# Patient Record
Sex: FEMALE | Race: WHITE | NOT HISPANIC OR LATINO | Employment: OTHER | URBAN - METROPOLITAN AREA
[De-identification: names, ages, dates, MRNs, and addresses within clinical notes are randomized per-mention and may not be internally consistent; named-entity substitution may affect disease eponyms.]

---

## 2019-05-21 ENCOUNTER — HOME CARE/HOSPICE - HEALTHEAST (OUTPATIENT)
Dept: HOME HEALTH SERVICES | Facility: HOME HEALTH | Age: 64
End: 2019-05-21

## 2019-05-21 ENCOUNTER — SURGERY - HEALTHEAST (OUTPATIENT)
Dept: GASTROENTEROLOGY | Facility: HOSPITAL | Age: 64
End: 2019-05-21

## 2019-05-21 ASSESSMENT — MIFFLIN-ST. JEOR: SCORE: 1263.92

## 2019-05-22 ASSESSMENT — MIFFLIN-ST. JEOR: SCORE: 1256.66

## 2019-05-23 ENCOUNTER — COMMUNICATION - HEALTHEAST (OUTPATIENT)
Dept: SCHEDULING | Facility: CLINIC | Age: 64
End: 2019-05-23

## 2019-05-24 ENCOUNTER — RECORDS - HEALTHEAST (OUTPATIENT)
Dept: ADMINISTRATIVE | Facility: OTHER | Age: 64
End: 2019-05-24

## 2019-05-28 ENCOUNTER — HOME CARE/HOSPICE - HEALTHEAST (OUTPATIENT)
Dept: HOME HEALTH SERVICES | Facility: HOME HEALTH | Age: 64
End: 2019-05-28

## 2019-05-28 ENCOUNTER — RECORDS - HEALTHEAST (OUTPATIENT)
Dept: ADMINISTRATIVE | Facility: OTHER | Age: 64
End: 2019-05-28

## 2019-05-31 ENCOUNTER — HOME CARE/HOSPICE - HEALTHEAST (OUTPATIENT)
Dept: HOME HEALTH SERVICES | Facility: HOME HEALTH | Age: 64
End: 2019-05-31

## 2019-06-03 ENCOUNTER — HOME CARE/HOSPICE - HEALTHEAST (OUTPATIENT)
Dept: HOME HEALTH SERVICES | Facility: HOME HEALTH | Age: 64
End: 2019-06-03

## 2019-06-04 ENCOUNTER — HOME CARE/HOSPICE - HEALTHEAST (OUTPATIENT)
Dept: HOME HEALTH SERVICES | Facility: HOME HEALTH | Age: 64
End: 2019-06-04

## 2019-06-07 ENCOUNTER — HOME CARE/HOSPICE - HEALTHEAST (OUTPATIENT)
Dept: HOME HEALTH SERVICES | Facility: HOME HEALTH | Age: 64
End: 2019-06-07

## 2019-06-11 ENCOUNTER — HOME CARE/HOSPICE - HEALTHEAST (OUTPATIENT)
Dept: HOME HEALTH SERVICES | Facility: HOME HEALTH | Age: 64
End: 2019-06-11

## 2019-06-13 ENCOUNTER — HOME CARE/HOSPICE - HEALTHEAST (OUTPATIENT)
Dept: HOME HEALTH SERVICES | Facility: HOME HEALTH | Age: 64
End: 2019-06-13

## 2019-06-13 ENCOUNTER — RECORDS - HEALTHEAST (OUTPATIENT)
Dept: ADMINISTRATIVE | Facility: OTHER | Age: 64
End: 2019-06-13

## 2019-06-14 ENCOUNTER — RECORDS - HEALTHEAST (OUTPATIENT)
Dept: ADMINISTRATIVE | Facility: OTHER | Age: 64
End: 2019-06-14

## 2019-06-18 ENCOUNTER — HOME CARE/HOSPICE - HEALTHEAST (OUTPATIENT)
Dept: HOME HEALTH SERVICES | Facility: HOME HEALTH | Age: 64
End: 2019-06-18

## 2019-06-20 ENCOUNTER — HOME CARE/HOSPICE - HEALTHEAST (OUTPATIENT)
Dept: HOME HEALTH SERVICES | Facility: HOME HEALTH | Age: 64
End: 2019-06-20

## 2019-06-25 ENCOUNTER — HOME CARE/HOSPICE - HEALTHEAST (OUTPATIENT)
Dept: HOME HEALTH SERVICES | Facility: HOME HEALTH | Age: 64
End: 2019-06-25

## 2019-06-27 ENCOUNTER — RECORDS - HEALTHEAST (OUTPATIENT)
Dept: ADMINISTRATIVE | Facility: OTHER | Age: 64
End: 2019-06-27

## 2019-06-27 ENCOUNTER — HOME CARE/HOSPICE - HEALTHEAST (OUTPATIENT)
Dept: HOME HEALTH SERVICES | Facility: HOME HEALTH | Age: 64
End: 2019-06-27

## 2019-06-28 ENCOUNTER — RECORDS - HEALTHEAST (OUTPATIENT)
Dept: ADMINISTRATIVE | Facility: OTHER | Age: 64
End: 2019-06-28

## 2019-07-01 ENCOUNTER — RECORDS - HEALTHEAST (OUTPATIENT)
Dept: ADMINISTRATIVE | Facility: OTHER | Age: 64
End: 2019-07-01

## 2019-07-02 ENCOUNTER — HOME CARE/HOSPICE - HEALTHEAST (OUTPATIENT)
Dept: HOME HEALTH SERVICES | Facility: HOME HEALTH | Age: 64
End: 2019-07-02

## 2019-07-03 ENCOUNTER — HOME CARE/HOSPICE - HEALTHEAST (OUTPATIENT)
Dept: HOME HEALTH SERVICES | Facility: HOME HEALTH | Age: 64
End: 2019-07-03

## 2019-07-04 ENCOUNTER — RECORDS - HEALTHEAST (OUTPATIENT)
Dept: ADMINISTRATIVE | Facility: OTHER | Age: 64
End: 2019-07-04

## 2019-07-05 ENCOUNTER — HOME CARE/HOSPICE - HEALTHEAST (OUTPATIENT)
Dept: HOME HEALTH SERVICES | Facility: HOME HEALTH | Age: 64
End: 2019-07-05

## 2019-07-08 ENCOUNTER — HOME CARE/HOSPICE - HEALTHEAST (OUTPATIENT)
Dept: HOME HEALTH SERVICES | Facility: HOME HEALTH | Age: 64
End: 2019-07-08

## 2019-07-10 ENCOUNTER — HOME CARE/HOSPICE - HEALTHEAST (OUTPATIENT)
Dept: HOME HEALTH SERVICES | Facility: HOME HEALTH | Age: 64
End: 2019-07-10

## 2019-07-11 ENCOUNTER — HOME CARE/HOSPICE - HEALTHEAST (OUTPATIENT)
Dept: HOME HEALTH SERVICES | Facility: HOME HEALTH | Age: 64
End: 2019-07-11

## 2019-07-12 ENCOUNTER — HOME CARE/HOSPICE - HEALTHEAST (OUTPATIENT)
Dept: HOME HEALTH SERVICES | Facility: HOME HEALTH | Age: 64
End: 2019-07-12

## 2019-07-15 ENCOUNTER — HOME CARE/HOSPICE - HEALTHEAST (OUTPATIENT)
Dept: HOME HEALTH SERVICES | Facility: HOME HEALTH | Age: 64
End: 2019-07-15

## 2019-07-17 ENCOUNTER — HOME CARE/HOSPICE - HEALTHEAST (OUTPATIENT)
Dept: HOME HEALTH SERVICES | Facility: HOME HEALTH | Age: 64
End: 2019-07-17

## 2019-07-18 ENCOUNTER — HOME CARE/HOSPICE - HEALTHEAST (OUTPATIENT)
Dept: HOME HEALTH SERVICES | Facility: HOME HEALTH | Age: 64
End: 2019-07-18

## 2019-07-19 ENCOUNTER — HOME CARE/HOSPICE - HEALTHEAST (OUTPATIENT)
Dept: HOME HEALTH SERVICES | Facility: HOME HEALTH | Age: 64
End: 2019-07-19

## 2019-07-22 ENCOUNTER — HOME CARE/HOSPICE - HEALTHEAST (OUTPATIENT)
Dept: HOME HEALTH SERVICES | Facility: HOME HEALTH | Age: 64
End: 2019-07-22

## 2019-07-23 ENCOUNTER — HOME CARE/HOSPICE - HEALTHEAST (OUTPATIENT)
Dept: HOME HEALTH SERVICES | Facility: HOME HEALTH | Age: 64
End: 2019-07-23

## 2019-07-24 ENCOUNTER — HOME CARE/HOSPICE - HEALTHEAST (OUTPATIENT)
Dept: HOME HEALTH SERVICES | Facility: HOME HEALTH | Age: 64
End: 2019-07-24

## 2019-08-02 ENCOUNTER — RECORDS - HEALTHEAST (OUTPATIENT)
Dept: ADMINISTRATIVE | Facility: OTHER | Age: 64
End: 2019-08-02

## 2021-05-29 NOTE — TELEPHONE ENCOUNTER
Triage call:   Trying to get a prescription filled- was admitted to P-1:    Disp Refills Start End    omeprazole (PRILOSEC) 20 MG capsule 60 capsule 0 5/23/2019     Sig - Route: Take 1 capsule (20 mg total) by mouth 2 (two) times a day before meals. - Oral    Class: Print      Called nursing unit patient was admitted to and they will follow up with patient. Not a [facility] patient- unable to route    Jo Ann Mcmahan RN BS[facility] Triage/Med Refill 5/23/2019 5:47 PM

## 2021-06-03 VITALS — HEIGHT: 62 IN | BODY MASS INDEX: 30.77 KG/M2 | WEIGHT: 167.2 LBS

## 2021-06-16 PROBLEM — R93.5 ABNORMAL CT OF THE ABDOMEN: Status: ACTIVE | Noted: 2019-05-20

## 2021-06-16 PROBLEM — E11.9 TYPE 2 DIABETES MELLITUS, WITH LONG-TERM CURRENT USE OF INSULIN (H): Status: ACTIVE | Noted: 2019-05-22

## 2021-06-16 PROBLEM — F10.939 ALCOHOL WITHDRAWAL, WITH UNSPECIFIED COMPLICATION (H): Status: ACTIVE | Noted: 2019-05-21

## 2021-06-16 PROBLEM — R29.6 FALLING EPISODES: Status: ACTIVE | Noted: 2019-05-22

## 2021-06-16 PROBLEM — E87.20 LACTIC ACIDOSIS: Status: ACTIVE | Noted: 2019-05-21

## 2021-06-16 PROBLEM — K22.10 ESOPHAGEAL ULCER: Status: ACTIVE | Noted: 2019-05-22

## 2021-06-16 PROBLEM — Z79.4 TYPE 2 DIABETES MELLITUS, WITH LONG-TERM CURRENT USE OF INSULIN (H): Status: ACTIVE | Noted: 2019-05-22

## 2021-06-16 PROBLEM — K29.60 EROSIVE GASTRITIS: Status: ACTIVE | Noted: 2019-05-22

## 2021-06-16 PROBLEM — I10 ESSENTIAL HYPERTENSION: Status: ACTIVE | Noted: 2019-05-22

## 2022-01-08 ENCOUNTER — APPOINTMENT (OUTPATIENT)
Dept: CT IMAGING | Facility: HOSPITAL | Age: 67
DRG: 208 | End: 2022-01-08
Payer: COMMERCIAL

## 2022-01-08 ENCOUNTER — HOSPITAL ENCOUNTER (INPATIENT)
Facility: HOSPITAL | Age: 67
LOS: 20 days | Discharge: HOME HEALTH | DRG: 208 | End: 2022-01-28
Attending: EMERGENCY MEDICINE | Admitting: FAMILY MEDICINE
Payer: COMMERCIAL

## 2022-01-08 DIAGNOSIS — U07.1 PNEUMONIA DUE TO 2019 NOVEL CORONAVIRUS: ICD-10-CM

## 2022-01-08 DIAGNOSIS — J12.82 PNEUMONIA DUE TO 2019 NOVEL CORONAVIRUS: ICD-10-CM

## 2022-01-08 DIAGNOSIS — J96.01 ACUTE RESPIRATORY FAILURE WITH HYPOXIA (H): ICD-10-CM

## 2022-01-08 DIAGNOSIS — U07.1 INFECTION DUE TO 2019 NOVEL CORONAVIRUS: ICD-10-CM

## 2022-01-08 DIAGNOSIS — E53.8 VITAMIN B12 DEFICIENCY: Primary | ICD-10-CM

## 2022-01-08 DIAGNOSIS — N28.9 ACUTE RENAL INSUFFICIENCY: ICD-10-CM

## 2022-01-08 DIAGNOSIS — E87.20 LACTIC ACIDOSIS: ICD-10-CM

## 2022-01-08 PROBLEM — R06.02 SHORTNESS OF BREATH: Status: ACTIVE | Noted: 2022-01-08

## 2022-01-08 PROBLEM — R05.9 COUGH: Status: ACTIVE | Noted: 2022-01-08

## 2022-01-08 PROBLEM — R79.89 ELEVATED D-DIMER: Status: ACTIVE | Noted: 2022-01-08

## 2022-01-08 PROBLEM — R74.02 NONSPECIFIC ELEVATION OF LEVELS OF TRANSAMINASE AND LACTIC ACID DEHYDROGENASE (LDH): Status: ACTIVE | Noted: 2022-01-08

## 2022-01-08 PROBLEM — N17.9 ACUTE KIDNEY INJURY (H): Status: ACTIVE | Noted: 2022-01-08

## 2022-01-08 PROBLEM — R74.01 NONSPECIFIC ELEVATION OF LEVELS OF TRANSAMINASE AND LACTIC ACID DEHYDROGENASE (LDH): Status: ACTIVE | Noted: 2022-01-08

## 2022-01-08 PROBLEM — R09.02 HYPOXIA: Status: ACTIVE | Noted: 2022-01-08

## 2022-01-08 LAB
ABO/RH(D): NORMAL
ALBUMIN SERPL-MCNC: 2.7 G/DL (ref 3.5–5)
ALP SERPL-CCNC: 76 U/L (ref 45–120)
ALT SERPL W P-5'-P-CCNC: 30 U/L (ref 0–45)
ANION GAP SERPL CALCULATED.3IONS-SCNC: 18 MMOL/L (ref 5–18)
APTT PPP: 32 SECONDS (ref 22–38)
AST SERPL W P-5'-P-CCNC: 69 U/L (ref 0–40)
BASE EXCESS BLDV CALC-SCNC: -1.6 MMOL/L
BASOPHILS # BLD AUTO: 0 10E3/UL (ref 0–0.2)
BASOPHILS NFR BLD AUTO: 0 %
BILIRUB DIRECT SERPL-MCNC: 0.2 MG/DL
BILIRUB SERPL-MCNC: 0.4 MG/DL (ref 0–1)
BNP SERPL-MCNC: 30 PG/ML (ref 0–109)
BUN SERPL-MCNC: 43 MG/DL (ref 8–22)
C REACTIVE PROTEIN LHE: 12 MG/DL (ref 0–0.8)
CALCIUM SERPL-MCNC: 9.9 MG/DL (ref 8.5–10.5)
CHLORIDE BLD-SCNC: 101 MMOL/L (ref 98–107)
CO2 SERPL-SCNC: 20 MMOL/L (ref 22–31)
CREAT SERPL-MCNC: 1.72 MG/DL (ref 0.6–1.1)
D DIMER PPP FEU-MCNC: 2.45 UG/ML FEU (ref 0–0.5)
EOSINOPHIL # BLD AUTO: 0 10E3/UL (ref 0–0.7)
EOSINOPHIL NFR BLD AUTO: 0 %
ERYTHROCYTE [DISTWIDTH] IN BLOOD BY AUTOMATED COUNT: 13.9 % (ref 10–15)
ETHANOL SERPL-MCNC: <10 MG/DL
FLUAV RNA SPEC QL NAA+PROBE: NEGATIVE
FLUBV RNA RESP QL NAA+PROBE: NEGATIVE
GFR SERPL CREATININE-BSD FRML MDRD: 32 ML/MIN/1.73M2
GLUCOSE BLD-MCNC: 132 MG/DL (ref 70–125)
GLUCOSE BLDC GLUCOMTR-MCNC: 147 MG/DL (ref 70–99)
HBA1C MFR BLD: 4.7 %
HCO3 BLDV-SCNC: 22 MMOL/L (ref 24–30)
HCT VFR BLD AUTO: 33.1 % (ref 35–47)
HGB BLD-MCNC: 11.1 G/DL (ref 11.7–15.7)
IMM GRANULOCYTES # BLD: 0.1 10E3/UL
IMM GRANULOCYTES NFR BLD: 1 %
INR PPP: 1.09 (ref 0.85–1.15)
LACTATE SERPL-SCNC: 2.1 MMOL/L (ref 0.7–2)
LYMPHOCYTES # BLD AUTO: 0.6 10E3/UL (ref 0.8–5.3)
LYMPHOCYTES NFR BLD AUTO: 9 %
MAGNESIUM SERPL-MCNC: 1.4 MG/DL (ref 1.8–2.6)
MCH RBC QN AUTO: 38.5 PG (ref 26.5–33)
MCHC RBC AUTO-ENTMCNC: 33.5 G/DL (ref 31.5–36.5)
MCV RBC AUTO: 115 FL (ref 78–100)
MONOCYTES # BLD AUTO: 0.5 10E3/UL (ref 0–1.3)
MONOCYTES NFR BLD AUTO: 7 %
NEUTROPHILS # BLD AUTO: 6 10E3/UL (ref 1.6–8.3)
NEUTROPHILS NFR BLD AUTO: 83 %
NRBC # BLD AUTO: 0 10E3/UL
NRBC BLD AUTO-RTO: 0 /100
OXYHGB MFR BLDV: 34 % (ref 70–75)
PCO2 BLDV: 40 MM HG (ref 35–50)
PH BLDV: 7.38 [PH] (ref 7.35–7.45)
PLATELET # BLD AUTO: 233 10E3/UL (ref 150–450)
PO2 BLDV: 25 MM HG (ref 25–47)
POTASSIUM BLD-SCNC: 3.5 MMOL/L (ref 3.5–5)
PROT SERPL-MCNC: 7.6 G/DL (ref 6–8)
RBC # BLD AUTO: 2.88 10E6/UL (ref 3.8–5.2)
SAO2 % BLDV: 34.5 % (ref 70–75)
SARS-COV-2 RNA RESP QL NAA+PROBE: POSITIVE
SODIUM SERPL-SCNC: 139 MMOL/L (ref 136–145)
SPECIMEN EXPIRATION DATE: NORMAL
TROPONIN I SERPL-MCNC: 0.24 NG/ML (ref 0–0.29)
WBC # BLD AUTO: 7.2 10E3/UL (ref 4–11)

## 2022-01-08 PROCEDURE — 258N000003 HC RX IP 258 OP 636: Performed by: EMERGENCY MEDICINE

## 2022-01-08 PROCEDURE — 36415 COLL VENOUS BLD VENIPUNCTURE: CPT | Performed by: EMERGENCY MEDICINE

## 2022-01-08 PROCEDURE — 36415 COLL VENOUS BLD VENIPUNCTURE: CPT | Performed by: PHYSICIAN ASSISTANT

## 2022-01-08 PROCEDURE — HZ2ZZZZ DETOXIFICATION SERVICES FOR SUBSTANCE ABUSE TREATMENT: ICD-10-PCS | Performed by: FAMILY MEDICINE

## 2022-01-08 PROCEDURE — C9803 HOPD COVID-19 SPEC COLLECT: HCPCS

## 2022-01-08 PROCEDURE — 86901 BLOOD TYPING SEROLOGIC RH(D): CPT | Performed by: FAMILY MEDICINE

## 2022-01-08 PROCEDURE — 120N000001 HC R&B MED SURG/OB

## 2022-01-08 PROCEDURE — 250N000011 HC RX IP 250 OP 636: Performed by: EMERGENCY MEDICINE

## 2022-01-08 PROCEDURE — 85610 PROTHROMBIN TIME: CPT | Performed by: EMERGENCY MEDICINE

## 2022-01-08 PROCEDURE — 85730 THROMBOPLASTIN TIME PARTIAL: CPT | Performed by: EMERGENCY MEDICINE

## 2022-01-08 PROCEDURE — 83605 ASSAY OF LACTIC ACID: CPT | Performed by: PHYSICIAN ASSISTANT

## 2022-01-08 PROCEDURE — 71275 CT ANGIOGRAPHY CHEST: CPT

## 2022-01-08 PROCEDURE — 84484 ASSAY OF TROPONIN QUANT: CPT | Performed by: PHYSICIAN ASSISTANT

## 2022-01-08 PROCEDURE — 250N000013 HC RX MED GY IP 250 OP 250 PS 637: Performed by: PHYSICIAN ASSISTANT

## 2022-01-08 PROCEDURE — 82248 BILIRUBIN DIRECT: CPT | Performed by: PHYSICIAN ASSISTANT

## 2022-01-08 PROCEDURE — 83735 ASSAY OF MAGNESIUM: CPT | Performed by: FAMILY MEDICINE

## 2022-01-08 PROCEDURE — 85379 FIBRIN DEGRADATION QUANT: CPT | Performed by: PHYSICIAN ASSISTANT

## 2022-01-08 PROCEDURE — 82805 BLOOD GASES W/O2 SATURATION: CPT | Performed by: EMERGENCY MEDICINE

## 2022-01-08 PROCEDURE — XW033E5 INTRODUCTION OF REMDESIVIR ANTI-INFECTIVE INTO PERIPHERAL VEIN, PERCUTANEOUS APPROACH, NEW TECHNOLOGY GROUP 5: ICD-10-PCS | Performed by: FAMILY MEDICINE

## 2022-01-08 PROCEDURE — 258N000003 HC RX IP 258 OP 636: Performed by: PHYSICIAN ASSISTANT

## 2022-01-08 PROCEDURE — 96361 HYDRATE IV INFUSION ADD-ON: CPT

## 2022-01-08 PROCEDURE — 99223 1ST HOSP IP/OBS HIGH 75: CPT | Performed by: FAMILY MEDICINE

## 2022-01-08 PROCEDURE — 87040 BLOOD CULTURE FOR BACTERIA: CPT | Performed by: PHYSICIAN ASSISTANT

## 2022-01-08 PROCEDURE — 83036 HEMOGLOBIN GLYCOSYLATED A1C: CPT | Performed by: FAMILY MEDICINE

## 2022-01-08 PROCEDURE — 85025 COMPLETE CBC W/AUTO DIFF WBC: CPT | Performed by: EMERGENCY MEDICINE

## 2022-01-08 PROCEDURE — 99285 EMERGENCY DEPT VISIT HI MDM: CPT | Mod: 25

## 2022-01-08 PROCEDURE — 87636 SARSCOV2 & INF A&B AMP PRB: CPT | Performed by: PHYSICIAN ASSISTANT

## 2022-01-08 PROCEDURE — 93005 ELECTROCARDIOGRAM TRACING: CPT | Performed by: PHYSICIAN ASSISTANT

## 2022-01-08 PROCEDURE — 96374 THER/PROPH/DIAG INJ IV PUSH: CPT | Mod: 59

## 2022-01-08 PROCEDURE — 83880 ASSAY OF NATRIURETIC PEPTIDE: CPT | Performed by: PHYSICIAN ASSISTANT

## 2022-01-08 PROCEDURE — 82077 ASSAY SPEC XCP UR&BREATH IA: CPT | Performed by: EMERGENCY MEDICINE

## 2022-01-08 PROCEDURE — 86140 C-REACTIVE PROTEIN: CPT | Performed by: PHYSICIAN ASSISTANT

## 2022-01-08 PROCEDURE — 94640 AIRWAY INHALATION TREATMENT: CPT

## 2022-01-08 RX ORDER — LIDOCAINE 40 MG/G
CREAM TOPICAL
Status: DISCONTINUED | OUTPATIENT
Start: 2022-01-08 | End: 2022-01-28 | Stop reason: HOSPADM

## 2022-01-08 RX ORDER — PROCHLORPERAZINE 25 MG
12.5 SUPPOSITORY, RECTAL RECTAL EVERY 12 HOURS PRN
Status: DISCONTINUED | OUTPATIENT
Start: 2022-01-08 | End: 2022-01-28 | Stop reason: HOSPADM

## 2022-01-08 RX ORDER — IOPAMIDOL 755 MG/ML
75 INJECTION, SOLUTION INTRAVASCULAR ONCE
Status: COMPLETED | OUTPATIENT
Start: 2022-01-08 | End: 2022-01-08

## 2022-01-08 RX ORDER — NICOTINE 21 MG/24HR
1 PATCH, TRANSDERMAL 24 HOURS TRANSDERMAL EVERY 24 HOURS
Status: DISCONTINUED | OUTPATIENT
Start: 2022-01-08 | End: 2022-01-16

## 2022-01-08 RX ORDER — DEXAMETHASONE SODIUM PHOSPHATE 10 MG/ML
6 INJECTION, SOLUTION INTRAMUSCULAR; INTRAVENOUS DAILY
Status: DISCONTINUED | OUTPATIENT
Start: 2022-01-08 | End: 2022-01-14

## 2022-01-08 RX ORDER — ACETAMINOPHEN 650 MG/1
650 SUPPOSITORY RECTAL EVERY 6 HOURS PRN
Status: DISCONTINUED | OUTPATIENT
Start: 2022-01-08 | End: 2022-01-28 | Stop reason: HOSPADM

## 2022-01-08 RX ORDER — CLONIDINE HYDROCHLORIDE 0.1 MG/1
0.2 TABLET ORAL 2 TIMES DAILY
Status: DISCONTINUED | OUTPATIENT
Start: 2022-01-08 | End: 2022-01-28 | Stop reason: HOSPADM

## 2022-01-08 RX ORDER — DEXTROSE MONOHYDRATE 25 G/50ML
25-50 INJECTION, SOLUTION INTRAVENOUS
Status: DISCONTINUED | OUTPATIENT
Start: 2022-01-08 | End: 2022-01-16

## 2022-01-08 RX ORDER — SODIUM CHLORIDE, SODIUM LACTATE, POTASSIUM CHLORIDE, CALCIUM CHLORIDE 600; 310; 30; 20 MG/100ML; MG/100ML; MG/100ML; MG/100ML
INJECTION, SOLUTION INTRAVENOUS CONTINUOUS
Status: DISCONTINUED | OUTPATIENT
Start: 2022-01-08 | End: 2022-01-08

## 2022-01-08 RX ORDER — SODIUM CHLORIDE 9 MG/ML
INJECTION, SOLUTION INTRAVENOUS CONTINUOUS
Status: DISCONTINUED | OUTPATIENT
Start: 2022-01-08 | End: 2022-01-09

## 2022-01-08 RX ORDER — OLANZAPINE 5 MG/1
5-10 TABLET, ORALLY DISINTEGRATING ORAL EVERY 6 HOURS PRN
Status: DISCONTINUED | OUTPATIENT
Start: 2022-01-08 | End: 2022-01-26

## 2022-01-08 RX ORDER — NICOTINE POLACRILEX 4 MG
15-30 LOZENGE BUCCAL
Status: DISCONTINUED | OUTPATIENT
Start: 2022-01-08 | End: 2022-01-16

## 2022-01-08 RX ORDER — PROCHLORPERAZINE MALEATE 5 MG
5 TABLET ORAL EVERY 6 HOURS PRN
Status: DISCONTINUED | OUTPATIENT
Start: 2022-01-08 | End: 2022-01-28 | Stop reason: HOSPADM

## 2022-01-08 RX ORDER — MULTIPLE VITAMINS W/ MINERALS TAB 9MG-400MCG
1 TAB ORAL DAILY
Status: DISCONTINUED | OUTPATIENT
Start: 2022-01-09 | End: 2022-01-14

## 2022-01-08 RX ORDER — ALBUTEROL SULFATE 90 UG/1
2 AEROSOL, METERED RESPIRATORY (INHALATION) EVERY 6 HOURS PRN
Status: DISCONTINUED | OUTPATIENT
Start: 2022-01-08 | End: 2022-01-08

## 2022-01-08 RX ORDER — LORAZEPAM 1 MG/1
1-2 TABLET ORAL EVERY 30 MIN PRN
Status: DISCONTINUED | OUTPATIENT
Start: 2022-01-08 | End: 2022-01-17

## 2022-01-08 RX ORDER — DEXAMETHASONE SODIUM PHOSPHATE 10 MG/ML
6 INJECTION, SOLUTION INTRAMUSCULAR; INTRAVENOUS ONCE
Status: COMPLETED | OUTPATIENT
Start: 2022-01-08 | End: 2022-01-08

## 2022-01-08 RX ORDER — ATENOLOL 25 MG/1
50 TABLET ORAL DAILY
Status: DISCONTINUED | OUTPATIENT
Start: 2022-01-09 | End: 2022-01-28 | Stop reason: HOSPADM

## 2022-01-08 RX ORDER — ACETAMINOPHEN 325 MG/1
650 TABLET ORAL EVERY 6 HOURS PRN
Status: DISCONTINUED | OUTPATIENT
Start: 2022-01-08 | End: 2022-01-28 | Stop reason: HOSPADM

## 2022-01-08 RX ORDER — ONDANSETRON 4 MG/1
4 TABLET, ORALLY DISINTEGRATING ORAL EVERY 6 HOURS PRN
Status: DISCONTINUED | OUTPATIENT
Start: 2022-01-08 | End: 2022-01-26

## 2022-01-08 RX ORDER — METHYLPREDNISOLONE SODIUM SUCCINATE 125 MG/2ML
125 INJECTION, POWDER, LYOPHILIZED, FOR SOLUTION INTRAMUSCULAR; INTRAVENOUS ONCE
Status: DISCONTINUED | OUTPATIENT
Start: 2022-01-08 | End: 2022-01-08

## 2022-01-08 RX ORDER — LORAZEPAM 2 MG/ML
1-2 INJECTION INTRAMUSCULAR EVERY 30 MIN PRN
Status: DISCONTINUED | OUTPATIENT
Start: 2022-01-08 | End: 2022-01-17

## 2022-01-08 RX ORDER — AMLODIPINE BESYLATE 5 MG/1
10 TABLET ORAL DAILY
Status: DISCONTINUED | OUTPATIENT
Start: 2022-01-09 | End: 2022-01-17

## 2022-01-08 RX ORDER — HALOPERIDOL 5 MG/ML
2.5-5 INJECTION INTRAMUSCULAR EVERY 6 HOURS PRN
Status: DISCONTINUED | OUTPATIENT
Start: 2022-01-08 | End: 2022-01-26

## 2022-01-08 RX ORDER — FLUMAZENIL 0.1 MG/ML
0.2 INJECTION, SOLUTION INTRAVENOUS
Status: DISCONTINUED | OUTPATIENT
Start: 2022-01-08 | End: 2022-01-28 | Stop reason: HOSPADM

## 2022-01-08 RX ORDER — ASPIRIN 81 MG/1
81 TABLET ORAL DAILY
Status: DISCONTINUED | OUTPATIENT
Start: 2022-01-09 | End: 2022-01-14

## 2022-01-08 RX ORDER — AMLODIPINE BESYLATE 10 MG/1
10 TABLET ORAL
COMMUNITY
Start: 2022-01-06

## 2022-01-08 RX ORDER — FOLIC ACID 1 MG/1
1 TABLET ORAL DAILY
Status: DISCONTINUED | OUTPATIENT
Start: 2022-01-09 | End: 2022-01-28 | Stop reason: HOSPADM

## 2022-01-08 RX ORDER — ONDANSETRON 2 MG/ML
4 INJECTION INTRAMUSCULAR; INTRAVENOUS EVERY 6 HOURS PRN
Status: DISCONTINUED | OUTPATIENT
Start: 2022-01-08 | End: 2022-01-26

## 2022-01-08 RX ADMIN — DEXAMETHASONE SODIUM PHOSPHATE 6 MG: 10 INJECTION, SOLUTION INTRAMUSCULAR; INTRAVENOUS at 16:27

## 2022-01-08 RX ADMIN — IOPAMIDOL 75 ML: 755 INJECTION, SOLUTION INTRAVENOUS at 17:52

## 2022-01-08 RX ADMIN — ALBUTEROL SULFATE 2 PUFF: 90 AEROSOL, METERED RESPIRATORY (INHALATION) at 16:57

## 2022-01-08 RX ADMIN — SODIUM CHLORIDE 1000 ML: 9 INJECTION, SOLUTION INTRAVENOUS at 17:21

## 2022-01-08 RX ADMIN — SODIUM CHLORIDE 500 ML: 9 INJECTION, SOLUTION INTRAVENOUS at 20:15

## 2022-01-08 ASSESSMENT — ACTIVITIES OF DAILY LIVING (ADL)
WEAR_GLASSES_OR_BLIND: NO
ADLS_ACUITY_SCORE: 5
ADLS_ACUITY_SCORE: 9
DIFFICULTY_EATING/SWALLOWING: NO
CONCENTRATING,_REMEMBERING_OR_MAKING_DECISIONS_DIFFICULTY: NO
ADLS_ACUITY_SCORE: 9
ADLS_ACUITY_SCORE: 9
EQUIPMENT_CURRENTLY_USED_AT_HOME: CANE, STRAIGHT
DIFFICULTY_COMMUNICATING: NO
TOILETING_ISSUES: NO
DRESSING/BATHING_DIFFICULTY: NO
ADLS_ACUITY_SCORE: 9

## 2022-01-08 ASSESSMENT — ENCOUNTER SYMPTOMS
ABDOMINAL PAIN: 0
BACK PAIN: 0
SHORTNESS OF BREATH: 1
COUGH: 0
FEVER: 0

## 2022-01-08 NOTE — ED NOTES
Patient lying in bed, awakens to questions. Able to answer questions appropriately, becomes short of breath when talking.

## 2022-01-08 NOTE — ED PROVIDER NOTES
Emergency Department Midlevel Supervisory Note     I personally saw the patient and performed a substantive portion of the visit including all aspects of the medical decision making.    ED Course:  3:52 PM Adriana Alston PA-C staffed patient with me. I agree with their assessment and plan of management, and I will see the patient.  3:55 PM I met with the patient to introduce myself, gather additional history, perform my initial exam, and discuss the plan.       PPE: Provider wore gloves, N95 mask, eye protection, surgical cap, and paper mask.     FINAL IMPRESSION:    ICD-10-CM    1. Acute respiratory failure with hypoxia (H)  J96.01    2. Pneumonia due to 2019 novel coronavirus  U07.1     J12.82    3. Acute renal insufficiency  N28.9    4. Lactic acidosis  E87.2      MEDICAL DECISION MAKIN year old female, history of DM2, HTN, HLD, alcohol abuse and schizoaffective disorder, who presents for evaluation of shortness of breath and weakness. She reports cough x ~2 weeks with onset of SOB today. She was hypoxic to 76% on RA when medics arrived. She denies associated chest pain. She was diagnosed with COVID ~11 days ago; she has not been vaccinated.    On exam, patient is ill-appearing. She is sleepy, but arouses easily to voice. She has tachypnea with coarse breath sounds diffusely; she has scattered dry crackles with no audible wheezes. She has tachycardic rate with regular rhythm.      On presentation, patient on NRB at 10L with O2 sats upper 90%s.  Patient transitioned to NC with O2 sats mid-upper 90%s on 5L.    Patient placed on cardiac monitor, IV access established and blood sent for labs.    EKG demonstrated sinus tachycardia with no ischemic changes; troponin upper limits of normal (0.24).    Unable to find documentation of COVID results - COVID test here positive.    Given hypoxia, patient given IV Decadron.     D-dimer elevated (2.45).  CTA chest performed and demonstrated moderate BL pulmonary  infiltrates consistent with COVID 19 pneumonitis with no PE; CAD.    Lactate mildly elevated (2.1), I suspect secondary to hypoxia rather than sepsis.  She was given gentle IV hydration; not given full 30 ml/kg IVF bolus given COVID infection.    She does have acute renal insufficiency with Creatinine 1.72 (GFR 32) - previous from 2 years ago was normal.    Patient admitted to Hospitalist Service for further management.  Patient hemodynamically stable throughout ED course.      MEDICATIONS GIVEN IN THE EMERGENCY DEPARTMENT:  Medications   amLODIPine (NORVASC) tablet 10 mg (10 mg Oral Given 1/9/22 0927)   aspirin EC tablet 81 mg ( Oral Automatically Held 1/12/22 0900)   atenolol (TENORMIN) tablet 50 mg (50 mg Oral Given 1/9/22 0927)   cloNIDine (CATAPRES) tablet 0.2 mg (0.2 mg Oral Given 1/9/22 0926)   insulin glargine (LANTUS PEN) injection 15 Units (15 Units Subcutaneous Given 1/9/22 0042)   OLANZapine (zyPREXA) tablet 15 mg (15 mg Oral Given 1/9/22 0043)   omeprazole (priLOSEC) CR capsule 20 mg (20 mg Oral Given 1/9/22 0926)   sertraline (ZOLOFT) tablet 150 mg (150 mg Oral Given 1/9/22 0936)   lidocaine 1 % 0.1-1 mL (has no administration in time range)   lidocaine (LMX4) cream (has no administration in time range)   sodium chloride (PF) 0.9% PF flush 3 mL (3 mLs Intracatheter Not Given 1/9/22 0601)   sodium chloride (PF) 0.9% PF flush 3 mL (has no administration in time range)   Medication instructions: Do NOT use nebulized medications (has no administration in time range)   glucose gel 15-30 g (has no administration in time range)     Or   dextrose 50 % injection 25-50 mL (has no administration in time range)     Or   glucagon injection 1 mg (has no administration in time range)   ipratropium-albuterol (COMBIVENT RESPIMAT) inhaler 2 puff (2 puffs Inhalation Given 1/9/22 0929)   dexamethasone PF (DECADRON) injection 6 mg (6 mg Intravenous Not Given 1/8/22 2225)   remdesivir 100 mg in sodium chloride 0.9 % 250 mL  intermittent infusion (has no administration in time range)     And   0.9% sodium chloride BOLUS (has no administration in time range)   acetaminophen (TYLENOL) tablet 650 mg (has no administration in time range)     Or   acetaminophen (TYLENOL) Suppository 650 mg (has no administration in time range)   ondansetron (ZOFRAN-ODT) ODT tab 4 mg (has no administration in time range)     Or   ondansetron (ZOFRAN) injection 4 mg (has no administration in time range)   prochlorperazine (COMPAZINE) injection 5 mg (has no administration in time range)     Or   prochlorperazine (COMPAZINE) tablet 5 mg (has no administration in time range)     Or   prochlorperazine (COMPAZINE) suppository 12.5 mg (has no administration in time range)   insulin aspart (NovoLOG) injection (RAPID ACTING) (1 Units Subcutaneous Not Given 1/9/22 0925)   insulin aspart (NovoLOG) injection (RAPID ACTING) (1 Units Subcutaneous Not Given 1/8/22 2251)   enoxaparin ANTICOAGULANT (LOVENOX) injection 40 mg ( Subcutaneous Automatically Held 1/14/22 2230)   nicotine Patch in Place ( Transdermal Patch in Place 1/9/22 0423)   nicotine (NICODERM CQ) 21 MG/24HR 24 hr patch 1 patch (1 patch Transdermal Patch/Med Applied 1/9/22 0045)   OLANZapine zydis (zyPREXA) ODT tab 5-10 mg (has no administration in time range)     Or   haloperidol lactate (HALDOL) injection 2.5-5 mg (has no administration in time range)   flumazenil (ROMAZICON) injection 0.2 mg (has no administration in time range)   melatonin tablet 5 mg (has no administration in time range)   LORazepam (ATIVAN) tablet 1-2 mg (has no administration in time range)     Or   LORazepam (ATIVAN) injection 1-2 mg (has no administration in time range)   thiamine (B-1) tablet 100 mg (100 mg Oral Given 1/9/22 0937)   folic acid (FOLVITE) tablet 1 mg (1 mg Oral Given 1/9/22 0926)   multivitamin w/minerals (THERA-VIT-M) tablet 1 tablet (1 tablet Oral Given 1/9/22 0926)   dexamethasone PF (DECADRON) injection 6 mg (6 mg  Intravenous Given 1/8/22 1627)   0.9% sodium chloride BOLUS (0 mLs Intravenous Stopped 1/8/22 1958)   0.9% sodium chloride BOLUS (500 mLs Intravenous New Bag 1/8/22 2015)   iopamidol (ISOVUE-370) solution 75 mL (75 mLs Intravenous Given 1/8/22 1752)   remdesivir 200 mg in sodium chloride 0.9 % 250 mL intermittent infusion (200 mg Intravenous New Bag 1/9/22 0030)     Followed by   0.9% sodium chloride BOLUS (50 mLs Intravenous New Bag 1/9/22 0033)   sodium chloride 0.9 % 1,000 mL with Infuvite Adult 10 mL, thiamine 100 mg, folic acid 1 mg infusion ( Intravenous New Bag 1/9/22 0040)       NEW PRESCRIPTIONS STARTED AT TODAY'S ED VISIT:  Current Discharge Medication List              CHIEF COMPLAINT:  Shortness of Breath and Generalized Weakness      Triage Note:Pt tested positive for COVID around 12/28/21. Comes in with EMS for shortness of breath and generalized weakness. Pt says she always has a lean to the right. Pt was found at home 76% on RA. 6L NC brought her to mid 80's, NRB at 10L pt is at 92%. Pt falls asleep during triage. Hard time ambulating at home right now. Occasional use of walker at home normally.         HPI       Geovanna Huff is a 66 year old female, history of type 2 diabetes mellitus, hypertension, hyperlipidemia, alcohol abuse and schizoaffective disorder, who presents to this ED via EMS for evaluation of shortness of breath and weakness.    Patient developed cough ~2 weeks ago that has been intermittent with the onset of shortness of breath today. When EMS arrived, she had an oxygen saturation level of 76% on room air. She denies associated chest pain. Does report some swelling of her feet. Reports generalized muscle aches; no associated headache, sore throat, fevers.     She was diagnosed with COVID-19 11 days ago (12/29).     She has otherwise been in her usual state of health and denies abdominal pain, N/V/D or other concerns.    Reported to PA that she has not had alcohol for 6 weeks.      REVIEW OF SYSTEMS:  All other systems reviewed and are negative.      Medical History     Past Medical History:   Diagnosis Date     Alcohol abuse      Diabetes mellitus, type 2 (H)      Schizoaffective disorder, bipolar type (H)      Tobacco abuse        Past Surgical History:   Procedure Laterality Date     [place] ESOPHAGOGASTRODUODENOSCOPY TRANSORAL DIAGNOSTIC N/A 5/21/2019    Procedure: ESOPHAGOGASTRODUODENOSCOPY (EGD) with biopsies;  Surgeon: Zakia Sutton MD;  Location: [facility];  Service: Gastroenterology       History reviewed. No pertinent family history.    Social History     Tobacco Use     Smoking status: Current Every Day Smoker     Packs/day: 2.00     Smokeless tobacco: Never Used   Substance Use Topics     Alcohol use: Yes     Comment: Alcoholic Drinks/day: 4-5 drinks a day, 1-2 bottles of rum a week     Drug use: Not Currently       No current outpatient medications on file.      Physical Exam     First Vitals:  Patient Vitals for the past 24 hrs:   BP Temp Temp src Pulse Resp SpO2 Weight   01/09/22 0919 (!) 158/79 98.2  F (36.8  C) Oral 88 20 97 % --   01/09/22 0411 133/66 98.1  F (36.7  C) Oral 105 20 92 % --   01/08/22 2205 126/61 97  F (36.1  C) -- 114 22 98 % --   01/08/22 2115 (!) 147/84 -- -- 104 23 99 % --   01/08/22 2100 135/77 -- -- 106 29 94 % --   01/08/22 2045 (!) 146/70 -- -- 104 22 97 % --   01/08/22 2030 (!) 149/74 -- -- 108 22 97 % --   01/08/22 2015 (!) 153/76 -- -- 108 22 97 % --   01/08/22 2000 135/83 -- -- 106 22 98 % --   01/08/22 1945 130/77 -- -- 106 20 98 % --   01/08/22 1930 134/84 -- -- 107 22 99 % --   01/08/22 1900 (!) 147/92 -- -- 105 22 97 % --   01/08/22 1750 (!) 144/75 -- -- 111 20 100 % --   01/08/22 1730 -- -- -- -- 20 -- --   01/08/22 1715 137/72 -- -- 112 28 99 % --   01/08/22 1619 137/84 -- -- 117 (!) 31 92 % --   01/08/22 1503 -- -- -- -- -- -- 75.8 kg (167 lb)   01/08/22 1500 122/74 -- -- (!) 122 -- 97 % --   01/08/22 1459 124/70 97.5  F (36.4  C) Oral  119 18 97 % --       PHYSICAL EXAM:   Physical Exam    GENERAL: Sleepy, but arouses easily to voice. Tachypnea with no overt distress. Appears ill.  HEENT: Normocephalic, atraumatic. Pupils equal, round and reactive. Conjunctiva normal.   NECK: No stridor.  PULMONARY: Tachypnea with somewhat coarse breath sounds diffusely; she has scattered dry crackles with no audible wheezes.   CARDIO: Tachycardic rate with regular rhythm.  No significant murmur, rub or gallop.  Radial pulses strong and symmetrical.  ABDOMINAL: Abdomen soft, non-distended and non-tender to palpation.    EXTREMITIES: There is 1-2+ bipedal pitting edema.  NEURO: Alert and oriented to person, place and time.  Cranial nerves grossly intact.  No focal motor deficit.  PSYCH: Normal mood and affect.  SKIN: No rashes.     Results     LAB:  All pertinent labs reviewed and interpreted  Labs Ordered and Resulted from Time of ED Arrival to Time of ED Departure   INFLUENZA A/B & SARS-COV2 PCR MULTIPLEX - Abnormal       Result Value    Influenza A PCR Negative      Influenza B PCR Negative      SARS CoV2 PCR Positive (*)    BASIC METABOLIC PANEL - Abnormal    Sodium 139      Potassium 3.5      Chloride 101      Carbon Dioxide (CO2) 20 (*)     Anion Gap 18      Urea Nitrogen 43 (*)     Creatinine 1.72 (*)     Calcium 9.9      Glucose 132 (*)     GFR Estimate 32 (*)    D DIMER QUANTITATIVE - Abnormal    D-Dimer Quantitative 2.45 (*)    HEPATIC FUNCTION PANEL - Abnormal    Bilirubin Total 0.4      Bilirubin Direct 0.2      Protein Total 7.6      Albumin 2.7 (*)     Alkaline Phosphatase 76      AST 69 (*)     ALT 30     CRP INFLAMMATION - Abnormal    CRP 12.0 (*)    LACTIC ACID WHOLE BLOOD - Abnormal    Lactic Acid 2.1 (*)    BLOOD GAS VENOUS - Abnormal    pH Venous 7.38      pCO2 Venous 40      pO2 Venous 25      Bicarbonate Venous 22 (*)     Base Excess/Deficit (+/-) -1.6      Oxyhemoglobin Venous 34.0 (*)     O2 Sat, Venous 34.5 (*)    CBC WITH PLATELETS AND  DIFFERENTIAL - Abnormal    WBC Count 7.2      RBC Count 2.88 (*)     Hemoglobin 11.1 (*)     Hematocrit 33.1 (*)      (*)     MCH 38.5 (*)     MCHC 33.5      RDW 13.9      Platelet Count 233      % Neutrophils 83      % Lymphocytes 9      % Monocytes 7      % Eosinophils 0      % Basophils 0      % Immature Granulocytes 1      NRBCs per 100 WBC 0      Absolute Neutrophils 6.0      Absolute Lymphocytes 0.6 (*)     Absolute Monocytes 0.5      Absolute Eosinophils 0.0      Absolute Basophils 0.0      Absolute Immature Granulocytes 0.1      Absolute NRBCs 0.0     TROPONIN I - Normal    Troponin I 0.24     B-TYPE NATRIURETIC PEPTIDE ([facility] ONLY) - Normal    BNP 30     INR - Normal    INR 1.09     PARTIAL THROMBOPLASTIN TIME - Normal    aPTT 32     ETHYL ALCOHOL LEVEL - Normal    Alcohol, Blood <10     BLOOD CULTURE   BLOOD CULTURE       RADIOLOGY:  CT Chest Pulmonary Embolism w Contrast   Final Result   IMPRESSION:   1.  Moderate bilateral pulmonary infiltrates consistent with COVID 19 pneumonitis.   2.  No pulmonary embolus.   3.  Coronary artery disease.          EC2022, 15:08; sinus tachycardia with rate of 121 bpm; normal intervals; normal conduction; no ST-T wave changes consistent with ACS or pericarditis; compared to previous EKG dated 2019, PVCs no longer present, infero-anterolateral T wave abnormalities no longer present    EKG independently reviewed and interpreted by Blessing Hernandez MD        I, Ashley [place], am serving as a scribe to document services personally performed by Blessing Hernandez MD based on my observation and the provider's statements to me. I, Blessing Hernandez MD attest that Ashley Pandya is acting in a scribe capacity, has observed my performance of the services and has documented them in accordance with my direction.    Blessing Hernandez MD  Emergency Medicine  [facility] EMERGENCY DEPARTMENT          Blessing Hernandez MD  22 [id number]

## 2022-01-08 NOTE — ED PROVIDER NOTES
EMERGENCY DEPARTMENT ENCOUNTER      NAME: Geovanna Huff  AGE: 66 year old female  YOB: 1955  MRN: [id number]  EVALUATION DATE & TIME: 1/8/2022  2:59 PM    PCP: Rico Bui    ED PROVIDER: Adriana Alston PA-C      Chief Complaint   Patient presents with     Shortness of Breath     Generalized Weakness         FINAL IMPRESSION:  1. Cough    2. Hypoxia    3. Shortness of breath    4. Acute kidney injury (H)    5. Elevated d-dimer          ED COURSE & MEDICAL DECISION MAKING:    Pertinent Labs & Imaging studies reviewed. (See chart for details)      2:54 PM I met with the patient for initial interview and exam. Discussed plan of care including diagnostic testing and treatment.  3:04 PM I rechecked the patient.  5:44 PM Spoke with Dr. Tanner regarding plan for admission.       Geovanna is a 66 year old female with PMH frequent falls, schizoaffective disorder, HTN, diabetes, alcohol withdrawal (reports last drink 3 weeks ago), presents to the ED for evaluation of shortness of breath. Patient reports POSITIVE COVID test approximately 9 days ago, but I cannot see any results and patient does not recall where she had the test performed. Worsening shortness of breath today. EMS called, patient 79% on room air, presents to the ED on 10L nonrebreather.    Denies any history of asthma or COPD. Patient is not on anticoagulation. Not on supplemental O2 at home at baseline.     Upon arrival, patient appears fatigued, O2 sats 90% on non-rebreather.   Lungs with coarse breath sounds, trace expiratory wheezes. Tachycardic, no murmurs. No leg pain or swelling. No abdominal tenderness or distention.     COVID testing is ultimately positive.   CBC with no leukocytosis, hemoglobin 11.1 (stable).  No electrolyte derangement, new ROSINA from 2 years ago (Creatinine increased from 0.88 to 1.72). D-dimer markedly elevated (2.45). PE study ordered and pending.   LFTs with mild elevation AST (69, again not unexpected with  suspected COVID). CRP 12.0. BNP 30.   Lactic acid is 2.1. Troponin is 0.24. EKG with sinus tachycardia, nonischemic.   VBG with pH 7.38.     With elevated lactate, blood cultures ordered but higher suspicion for COVID rather than acute bacterial infection. Will hold off on antibiotics at this time. 500 ml of IV fluids ordered for patient with ROSINA and concern for dehydration, but in the setting of COVID-19 will not do 15 mL/kg.     Patient given albuterol inhaler and decadron for treatment of COVID hypoxia. Plan for admit for further monitoring and management. CT PE pending. Patient currently on 6L Oximask with O2 sats >90%.     MEDICATIONS GIVEN IN THE EMERGENCY:  Medications   albuterol (PROVENTIL HFA/VENTOLIN HFA) inhaler (2 puffs Inhalation Given 1/8/22 1657)   0.9% sodium chloride BOLUS (has no administration in time range)   dexamethasone PF (DECADRON) injection 6 mg (6 mg Intravenous Given 1/8/22 1627)   0.9% sodium chloride BOLUS (1,000 mLs Intravenous New Bag 1/8/22 1721)       NEW PRESCRIPTIONS STARTED AT TODAY'S ER VISIT  New Prescriptions    No medications on file          =================================================================    HPI    Patient information was obtained from: patient     Use of : N/A      Geovanna Huff is a 66 year old female with a pertinent history of s/p partial thyroidectomy, esophageal reflux, allergic rhinitis who presents to this ED by EMS for evaluation of shortness of breath.    Patient diagnosed with COVID19 ~11 days ago. States she does not know where she was tested for COVID19. Today, patient became severely short of breath so she called EMS. Upon arrival, EMS report patient sating at 79% on room air. Currently placed on 10 L/min oxymask. Not on home oxygen. She denies chest pain, back pain, abdominal pain, leg swelling, or leg pain. No new fever or cough. Denies history of asthma or COPD. Not on blood thinners.    REVIEW OF SYSTEMS   Review of Systems    Constitutional: Negative for fever.   Respiratory: Positive for shortness of breath. Negative for cough.    Cardiovascular: Negative for chest pain and leg swelling.   Gastrointestinal: Negative for abdominal pain.   Musculoskeletal: Negative for back pain.        Negative for leg pain.   All other systems reviewed and are negative.    PAST MEDICAL HISTORY:  History reviewed. No pertinent past medical history.    PAST SURGICAL HISTORY:  Past Surgical History:   Procedure Laterality Date     WA ESOPHAGOGASTRODUODENOSCOPY TRANSORAL DIAGNOSTIC N/A 5/21/2019    Procedure: ESOPHAGOGASTRODUODENOSCOPY (EGD) with biopsies;  Surgeon: Zakia Sutton MD;  Location: [facility];  Service: Gastroenterology           CURRENT MEDICATIONS:    Current Facility-Administered Medications   Medication     0.9% sodium chloride BOLUS     albuterol (PROVENTIL HFA/VENTOLIN HFA) inhaler     Current Outpatient Medications   Medication     amLODIPine (NORVASC) 5 MG tablet     aspirin 81 MG EC tablet     atenolol (TENORMIN) 50 MG tablet     cloNIDine HCl (CATAPRES) 0.2 MG tablet     insulin glargine (LANTUS) 100 unit/mL injection     metFORMIN (GLUCOPHAGE) 1000 MG tablet     OLANZapine (ZYPREXA) 15 MG tablet     omeprazole (PRILOSEC) 20 MG capsule     risperiDONE (RISPERDAL) 3 MG tablet     sertraline (ZOLOFT) 100 MG tablet     simvastatin (ZOCOR) 40 MG tablet         ALLERGIES:  No Known Allergies    FAMILY HISTORY:  History reviewed. No pertinent family history.    SOCIAL HISTORY:   Social History     Socioeconomic History     Marital status: Single     Spouse name: Not on file     Number of children: Not on file     Years of education: Not on file     Highest education level: Not on file   Occupational History     Not on file   Tobacco Use     Smoking status: Current Every Day Smoker     Packs/day: 1.00     Smokeless tobacco: Never Used   Substance and Sexual Activity     Alcohol use: Yes     Comment: Alcoholic Drinks/day: 4-5 drinks a  day, 1-2 bottles of rum a week     Drug use: Not Currently     Sexual activity: Not on file   Other Topics Concern     Not on file   Social History Narrative     Not on file     Social Determinants of Health     Financial Resource Strain: Not on file   Food Insecurity: Not on file   Transportation Needs: Not on file   Physical Activity: Not on file   Stress: Not on file   Social Connections: Not on file   Intimate Partner Violence: Not on file   Housing Stability: Not on file       VITALS:  /72   Pulse 112   Temp 97.5  F (36.4  C) (Oral)   Resp 20   Wt 75.8 kg (167 lb)   SpO2 99%   BMI 30.54 kg/m      PHYSICAL EXAM    Constitutional: No acute distress  HENT: Normocephalic, Atraumatic, Oropharynx clear  Eyes: Conjunctiva normal  Respiratory: On non-rebreather mask, mild tachypnea. Course breath sounds with trace expiratory wheezes throughout  Cardiovascular: Tachycardic. Regular rhythm, No murmurs  GI: Soft, nontender. Nondistended  Musculoskeletal: No deformities, Moves all extremities. No calf tenderness or swelling  Integument: Warm, Dry, No erythema, No rash. No petechiae.   Neurologic: Alert & oriented x 3, No focal deficits  Psychiatric: Affect normal, Judgment normal, Mood normal. Cooperative.      LAB:  All pertinent labs reviewed and interpreted.  Labs Ordered and Resulted from Time of ED Arrival to Time of ED Departure   INFLUENZA A/B & SARS-COV2 PCR MULTIPLEX - Abnormal       Result Value    Influenza A PCR Negative      Influenza B PCR Negative      SARS CoV2 PCR Positive (*)    BASIC METABOLIC PANEL - Abnormal    Sodium 139      Potassium 3.5      Chloride 101      Carbon Dioxide (CO2) 20 (*)     Anion Gap 18      Urea Nitrogen 43 (*)     Creatinine 1.72 (*)     Calcium 9.9      Glucose 132 (*)     GFR Estimate 32 (*)    D DIMER QUANTITATIVE - Abnormal    D-Dimer Quantitative 2.45 (*)    HEPATIC FUNCTION PANEL - Abnormal    Bilirubin Total 0.4      Bilirubin Direct 0.2      Protein Total 7.6       Albumin 2.7 (*)     Alkaline Phosphatase 76      AST 69 (*)     ALT 30     CRP INFLAMMATION - Abnormal    CRP 12.0 (*)    LACTIC ACID WHOLE BLOOD - Abnormal    Lactic Acid 2.1 (*)    BLOOD GAS VENOUS - Abnormal    pH Venous 7.38      pCO2 Venous 40      pO2 Venous 25      Bicarbonate Venous 22 (*)     Base Excess/Deficit (+/-) -1.6      Oxyhemoglobin Venous 34.0 (*)     O2 Sat, Venous 34.5 (*)    CBC WITH PLATELETS AND DIFFERENTIAL - Abnormal    WBC Count 7.2      RBC Count 2.88 (*)     Hemoglobin 11.1 (*)     Hematocrit 33.1 (*)      (*)     MCH 38.5 (*)     MCHC 33.5      RDW 13.9      Platelet Count 233      % Neutrophils 83      % Lymphocytes 9      % Monocytes 7      % Eosinophils 0      % Basophils 0      % Immature Granulocytes 1      NRBCs per 100 WBC 0      Absolute Neutrophils 6.0      Absolute Lymphocytes 0.6 (*)     Absolute Monocytes 0.5      Absolute Eosinophils 0.0      Absolute Basophils 0.0      Absolute Immature Granulocytes 0.1      Absolute NRBCs 0.0     TROPONIN I - Normal    Troponin I 0.24     B-TYPE NATRIURETIC PEPTIDE ([facility] ONLY) - Normal    BNP 30     INR - Normal    INR 1.09     PARTIAL THROMBOPLASTIN TIME - Normal    aPTT 32     ETHYL ALCOHOL LEVEL - Normal    Alcohol, Blood <10     BLOOD CULTURE   BLOOD CULTURE       RADIOLOGY:  Reviewed all pertinent imaging. Please see official radiology report.  XR Chest Port 1 View    (Results Pending)   CT Chest Pulmonary Embolism w Contrast    (Results Pending)       EKG:    Performed at: 15:08:01    Impression: Sinus tachycardia, Otherwise normal ECG    Rate: 121 BPM  LA Interval: 144 ms  QRS Interval: 66 ms  QTc Interval: 326/462 ms  ST Changes: None  Comparison: When compared with ECG of 21-May-2019 00:52, PVCs no longer present, nonspecific T wave abnormalities improved    Dr. Hernandez and I have independently reviewed and interpreted the EKG(s) documented above.    PROCEDURES:   None      Jose KINSEY am serving as a scribe to  document services personally performed by Adriana Alston PA-C, based on my observation and the provider's statements to me. I, Adriana Alston PA-C  attest that Josenyla King is acting in a scribe capacity, has observed my performance of the services and has documented them in accordance with my direction.    Adriana Alston PA-C  Emergency Medicine  [facility] EMERGENCY DEPARTMENT  [address] [place] [id number]  [phone]  Dept: [phone]     Adriana Alston PA-C  01/08/22 7326

## 2022-01-08 NOTE — ED TRIAGE NOTES
Pt tested positive for COVID around 12/28/21. Comes in with EMS for shortness of breath and generalized weakness. Pt says she always has a lean to the right. Pt was found at home 76% on RA. 6L NC brought her to mid 80's, NRB at 10L pt is at 92%. Pt falls asleep during triage. Hard time ambulating at home right now. Occasional use of walker at home normally.

## 2022-01-09 LAB
ANION GAP SERPL CALCULATED.3IONS-SCNC: 8 MMOL/L (ref 5–18)
ATRIAL RATE - MUSE: 121 BPM
BUN SERPL-MCNC: 35 MG/DL (ref 8–22)
C REACTIVE PROTEIN LHE: 7.5 MG/DL (ref 0–0.8)
CALCIUM SERPL-MCNC: 7.5 MG/DL (ref 8.5–10.5)
CHLORIDE BLD-SCNC: 112 MMOL/L (ref 98–107)
CO2 SERPL-SCNC: 22 MMOL/L (ref 22–31)
CREAT SERPL-MCNC: 0.95 MG/DL (ref 0.6–1.1)
D DIMER PPP FEU-MCNC: 1.45 UG/ML FEU (ref 0–0.5)
DIASTOLIC BLOOD PRESSURE - MUSE: 74 MMHG
ERYTHROCYTE [DISTWIDTH] IN BLOOD BY AUTOMATED COUNT: 14 % (ref 10–15)
FIBRINOGEN PPP-MCNC: 556 MG/DL (ref 170–490)
GFR SERPL CREATININE-BSD FRML MDRD: 66 ML/MIN/1.73M2
GLUCOSE BLD-MCNC: 100 MG/DL (ref 70–125)
GLUCOSE BLDC GLUCOMTR-MCNC: 151 MG/DL (ref 70–99)
GLUCOSE BLDC GLUCOMTR-MCNC: 158 MG/DL (ref 70–99)
GLUCOSE BLDC GLUCOMTR-MCNC: 88 MG/DL (ref 70–99)
GLUCOSE BLDC GLUCOMTR-MCNC: 97 MG/DL (ref 70–99)
HAPTOGLOB SERPL-MCNC: 393 MG/DL (ref 33–171)
HCT VFR BLD AUTO: 22.1 % (ref 35–47)
HGB BLD-MCNC: 7.2 G/DL (ref 11.7–15.7)
HGB BLD-MCNC: 9 G/DL (ref 11.7–15.7)
INTERPRETATION ECG - MUSE: NORMAL
LACTATE SERPL-SCNC: 0.6 MMOL/L (ref 0.7–2)
LDH SERPL L TO P-CCNC: 328 U/L (ref 125–220)
MCH RBC QN AUTO: 37.9 PG (ref 26.5–33)
MCHC RBC AUTO-ENTMCNC: 32.6 G/DL (ref 31.5–36.5)
MCV RBC AUTO: 116 FL (ref 78–100)
P AXIS - MUSE: 57 DEGREES
PLATELET # BLD AUTO: 190 10E3/UL (ref 150–450)
POTASSIUM BLD-SCNC: 3.2 MMOL/L (ref 3.5–5)
POTASSIUM BLD-SCNC: 4.4 MMOL/L (ref 3.5–5)
PR INTERVAL - MUSE: 144 MS
PROCALCITONIN SERPL-MCNC: 0.09 NG/ML (ref 0–0.49)
QRS DURATION - MUSE: 66 MS
QT - MUSE: 326 MS
QTC - MUSE: 462 MS
R AXIS - MUSE: 17 DEGREES
RBC # BLD AUTO: 1.9 10E6/UL (ref 3.8–5.2)
SODIUM SERPL-SCNC: 142 MMOL/L (ref 136–145)
SYSTOLIC BLOOD PRESSURE - MUSE: 122 MMHG
T AXIS - MUSE: 66 DEGREES
VENTRICULAR RATE- MUSE: 121 BPM
WBC # BLD AUTO: 3 10E3/UL (ref 4–11)

## 2022-01-09 PROCEDURE — 258N000003 HC RX IP 258 OP 636: Performed by: FAMILY MEDICINE

## 2022-01-09 PROCEDURE — 36415 COLL VENOUS BLD VENIPUNCTURE: CPT | Performed by: FAMILY MEDICINE

## 2022-01-09 PROCEDURE — 85379 FIBRIN DEGRADATION QUANT: CPT | Performed by: FAMILY MEDICINE

## 2022-01-09 PROCEDURE — 86140 C-REACTIVE PROTEIN: CPT | Performed by: FAMILY MEDICINE

## 2022-01-09 PROCEDURE — 250N000013 HC RX MED GY IP 250 OP 250 PS 637: Performed by: FAMILY MEDICINE

## 2022-01-09 PROCEDURE — 85018 HEMOGLOBIN: CPT | Performed by: INTERNAL MEDICINE

## 2022-01-09 PROCEDURE — 99233 SBSQ HOSP IP/OBS HIGH 50: CPT | Performed by: INTERNAL MEDICINE

## 2022-01-09 PROCEDURE — 120N000001 HC R&B MED SURG/OB

## 2022-01-09 PROCEDURE — 85384 FIBRINOGEN ACTIVITY: CPT | Performed by: FAMILY MEDICINE

## 2022-01-09 PROCEDURE — 250N000013 HC RX MED GY IP 250 OP 250 PS 637: Performed by: INTERNAL MEDICINE

## 2022-01-09 PROCEDURE — 80048 BASIC METABOLIC PNL TOTAL CA: CPT | Performed by: FAMILY MEDICINE

## 2022-01-09 PROCEDURE — 83605 ASSAY OF LACTIC ACID: CPT | Performed by: FAMILY MEDICINE

## 2022-01-09 PROCEDURE — 83615 LACTATE (LD) (LDH) ENZYME: CPT | Performed by: INTERNAL MEDICINE

## 2022-01-09 PROCEDURE — 250N000009 HC RX 250: Performed by: FAMILY MEDICINE

## 2022-01-09 PROCEDURE — 83010 ASSAY OF HAPTOGLOBIN QUANT: CPT | Performed by: INTERNAL MEDICINE

## 2022-01-09 PROCEDURE — 250N000011 HC RX IP 250 OP 636: Performed by: INTERNAL MEDICINE

## 2022-01-09 PROCEDURE — 85027 COMPLETE CBC AUTOMATED: CPT | Performed by: FAMILY MEDICINE

## 2022-01-09 PROCEDURE — 84145 PROCALCITONIN (PCT): CPT | Performed by: FAMILY MEDICINE

## 2022-01-09 PROCEDURE — 250N000011 HC RX IP 250 OP 636: Performed by: FAMILY MEDICINE

## 2022-01-09 PROCEDURE — 258N000003 HC RX IP 258 OP 636: Performed by: INTERNAL MEDICINE

## 2022-01-09 PROCEDURE — 36415 COLL VENOUS BLD VENIPUNCTURE: CPT | Performed by: INTERNAL MEDICINE

## 2022-01-09 PROCEDURE — 84132 ASSAY OF SERUM POTASSIUM: CPT | Performed by: INTERNAL MEDICINE

## 2022-01-09 PROCEDURE — 250N000012 HC RX MED GY IP 250 OP 636 PS 637: Performed by: FAMILY MEDICINE

## 2022-01-09 RX ORDER — POTASSIUM CHLORIDE 7.45 MG/ML
10 INJECTION INTRAVENOUS
Status: COMPLETED | OUTPATIENT
Start: 2022-01-09 | End: 2022-01-09

## 2022-01-09 RX ADMIN — POTASSIUM CHLORIDE 10 MEQ: 7.46 INJECTION, SOLUTION INTRAVENOUS at 17:30

## 2022-01-09 RX ADMIN — FOLIC ACID: 5 INJECTION, SOLUTION INTRAMUSCULAR; INTRAVENOUS; SUBCUTANEOUS at 00:40

## 2022-01-09 RX ADMIN — SERTRALINE HYDROCHLORIDE 150 MG: 50 TABLET ORAL at 09:36

## 2022-01-09 RX ADMIN — CLONIDINE HYDROCHLORIDE 0.2 MG: 0.1 TABLET ORAL at 00:43

## 2022-01-09 RX ADMIN — SODIUM CHLORIDE: 9 INJECTION, SOLUTION INTRAVENOUS at 02:17

## 2022-01-09 RX ADMIN — ATENOLOL 50 MG: 25 TABLET ORAL at 09:27

## 2022-01-09 RX ADMIN — SODIUM CHLORIDE 50 ML: 9 INJECTION, SOLUTION INTRAVENOUS at 17:30

## 2022-01-09 RX ADMIN — FOLIC ACID 1 MG: 1 TABLET ORAL at 09:26

## 2022-01-09 RX ADMIN — POTASSIUM CHLORIDE 10 MEQ: 7.46 INJECTION, SOLUTION INTRAVENOUS at 19:28

## 2022-01-09 RX ADMIN — OMEPRAZOLE 20 MG: 20 CAPSULE, DELAYED RELEASE ORAL at 16:52

## 2022-01-09 RX ADMIN — ASPIRIN 81 MG: 81 TABLET, COATED ORAL at 09:26

## 2022-01-09 RX ADMIN — MULTIPLE VITAMINS W/ MINERALS TAB 1 TABLET: TAB at 09:26

## 2022-01-09 RX ADMIN — REMDESIVIR 100 MG: 100 INJECTION, POWDER, LYOPHILIZED, FOR SOLUTION INTRAVENOUS at 17:23

## 2022-01-09 RX ADMIN — ENOXAPARIN SODIUM 40 MG: 40 INJECTION SUBCUTANEOUS at 20:50

## 2022-01-09 RX ADMIN — POTASSIUM CHLORIDE 10 MEQ: 7.46 INJECTION, SOLUTION INTRAVENOUS at 18:40

## 2022-01-09 RX ADMIN — DEXAMETHASONE SODIUM PHOSPHATE 6 MG: 10 INJECTION, SOLUTION INTRAMUSCULAR; INTRAVENOUS at 13:15

## 2022-01-09 RX ADMIN — OLANZAPINE 15 MG: 10 TABLET, FILM COATED ORAL at 20:51

## 2022-01-09 RX ADMIN — IPRATROPIUM BROMIDE AND ALBUTEROL 2 PUFF: 20; 100 SPRAY, METERED RESPIRATORY (INHALATION) at 13:17

## 2022-01-09 RX ADMIN — CLONIDINE HYDROCHLORIDE 0.2 MG: 0.1 TABLET ORAL at 20:51

## 2022-01-09 RX ADMIN — Medication 100 MG: at 09:37

## 2022-01-09 RX ADMIN — INSULIN GLARGINE 15 UNITS: 100 INJECTION, SOLUTION SUBCUTANEOUS at 00:42

## 2022-01-09 RX ADMIN — NICOTINE 1 PATCH: 21 PATCH, EXTENDED RELEASE TRANSDERMAL at 20:51

## 2022-01-09 RX ADMIN — OLANZAPINE 15 MG: 10 TABLET, FILM COATED ORAL at 00:43

## 2022-01-09 RX ADMIN — IPRATROPIUM BROMIDE AND ALBUTEROL 2 PUFF: 20; 100 SPRAY, METERED RESPIRATORY (INHALATION) at 09:29

## 2022-01-09 RX ADMIN — NICOTINE 1 PATCH: 21 PATCH, EXTENDED RELEASE TRANSDERMAL at 00:45

## 2022-01-09 RX ADMIN — INSULIN ASPART 1 UNITS: 100 INJECTION, SOLUTION INTRAVENOUS; SUBCUTANEOUS at 16:54

## 2022-01-09 RX ADMIN — INSULIN GLARGINE 15 UNITS: 100 INJECTION, SOLUTION SUBCUTANEOUS at 20:56

## 2022-01-09 RX ADMIN — CLONIDINE HYDROCHLORIDE 0.2 MG: 0.1 TABLET ORAL at 09:26

## 2022-01-09 RX ADMIN — ENOXAPARIN SODIUM 40 MG: 40 INJECTION SUBCUTANEOUS at 00:42

## 2022-01-09 RX ADMIN — AMLODIPINE BESYLATE 10 MG: 5 TABLET ORAL at 09:27

## 2022-01-09 RX ADMIN — POTASSIUM CHLORIDE 10 MEQ: 7.46 INJECTION, SOLUTION INTRAVENOUS at 21:11

## 2022-01-09 RX ADMIN — IPRATROPIUM BROMIDE AND ALBUTEROL 2 PUFF: 20; 100 SPRAY, METERED RESPIRATORY (INHALATION) at 00:47

## 2022-01-09 RX ADMIN — REMDESIVIR 200 MG: 100 INJECTION, POWDER, LYOPHILIZED, FOR SOLUTION INTRAVENOUS at 00:30

## 2022-01-09 RX ADMIN — OMEPRAZOLE 20 MG: 20 CAPSULE, DELAYED RELEASE ORAL at 09:26

## 2022-01-09 RX ADMIN — IPRATROPIUM BROMIDE AND ALBUTEROL 2 PUFF: 20; 100 SPRAY, METERED RESPIRATORY (INHALATION) at 16:52

## 2022-01-09 RX ADMIN — IPRATROPIUM BROMIDE AND ALBUTEROL 2 PUFF: 20; 100 SPRAY, METERED RESPIRATORY (INHALATION) at 20:52

## 2022-01-09 RX ADMIN — SODIUM CHLORIDE 50 ML: 9 INJECTION, SOLUTION INTRAVENOUS at 00:33

## 2022-01-09 ASSESSMENT — ACTIVITIES OF DAILY LIVING (ADL)
ADLS_ACUITY_SCORE: 7

## 2022-01-09 NOTE — PHARMACY-ADMISSION MEDICATION HISTORY
Pharmacy Note - Admission Medication History    ______________________________________________________________________    Prior To Admission (PTA) med list completed and updated in EMR.       PTA Med List   Medication Sig Last Dose     amLODIPine (NORVASC) 10 MG tablet Take 10 mg by mouth 1/8/2022 at Unknown time     atenolol (TENORMIN) 50 MG tablet [ATENOLOL (TENORMIN) 50 MG TABLET] Take 50 mg by mouth daily. 1/8/2022 at Unknown time     cloNIDine HCl (CATAPRES) 0.2 MG tablet [CLONIDINE HCL (CATAPRES) 0.2 MG TABLET] Take 0.2 mg by mouth 2 (two) times a day. 1/8/2022 at x1     insulin glargine (LANTUS) 100 unit/mL injection [INSULIN GLARGINE (LANTUS) 100 UNIT/ML INJECTION] Inject 15 Units under the skin at bedtime. 1/7/2022 at hs     metFORMIN (GLUCOPHAGE) 1000 MG tablet [METFORMIN (GLUCOPHAGE) 1000 MG TABLET] Take 1,000 mg by mouth 2 (two) times a day with meals. 1/8/2022 at x1     OLANZapine (ZYPREXA) 15 MG tablet [OLANZAPINE (ZYPREXA) 15 MG TABLET] Take 15 mg by mouth at bedtime. 1/7/2022 at hs     omeprazole (PRILOSEC) 20 MG capsule [OMEPRAZOLE (PRILOSEC) 20 MG CAPSULE] Take 1 capsule (20 mg total) by mouth 2 (two) times a day before meals. 1/8/2022 at x1     sertraline (ZOLOFT) 100 MG tablet [SERTRALINE (ZOLOFT) 100 MG TABLET] Take 150 mg by mouth daily. 1/8/2022 at am     simvastatin (ZOCOR) 40 MG tablet [SIMVASTATIN (ZOCOR) 40 MG TABLET] Take 40 mg by mouth at bedtime. 1/7/2022 at hs       Information source(s): Patient and CareEverywhere/SureScripts  Method of interview communication: iPad    Summary of Changes to PTA Med List  New: none  Discontinued: risperidone  Changed: amlodipine dose    Patient was asked about OTC/herbal products specifically.  PTA med list reflects this.    In the past week, patient estimated taking medication this percent of the time:  greater than 90%.    Allergies were reviewed, assessed, and updated with the patient.      Patient does not use any multi-dose medications prior to  admission.    The information provided in this note is only as accurate as the sources available at the time of the update(s).    Thank you,  Zuleyka Pope, [facility]  1/8/2022 6:50 PM

## 2022-01-09 NOTE — PLAN OF CARE
Problem: Gas Exchange Impaired  Goal: Optimal Gas Exchange  Outcome: No Change  Intervention: Optimize Oxygenation and Ventilation  Recent Flowsheet Documentation  Taken 1/9/2022 0100 by Rachael Lr, RN  Head of Bed (HOB) Positioning: HOB at 45 degrees    Pt desats with activity and on RA. On 5L oxymask and kept desating 02 to low-mid 80s, so I increased to 7L oxymask and sats at 90%. Started 1st dose of Remdesivir overnight. Ongoing NS IVF & Thiamine infusion. VSS except LS diminished and occasional SOB and dyspnea with exertion

## 2022-01-09 NOTE — H&P
[facility]    History and Physical - Hospitalist Service       Date of Admission:  1/8/2022    Assessment & Plan      Geovanna Huff is a 66 year old female with a history of diabetes, chronic tobacco abuse and alcohol abuse admitted to the hospital with acute respiratory failure and Covid infection    # Confirmed COVID-19 infection    # Acute Hypoxic Respiratory Failure secondary to COVID-19 infection  # Viral Pneumonia secondary to COVID-19 infection     Symptom Onset 12/28/2021   Date of 1st Positive Test 12/28/2021   Vaccination Status Partially Vaccinated    Thinks got J and J - unsure when and no booster   - COVID-19 special precautions, continuous pulse-ox  - Oxygen: continue current support with nasal cannula at 5 L/min; titrate to keep SpO2 between 90-96%  - Labs: standard COVID admission labs ordered (CBC with diff, CMP, retic count, LDH, troponin, BNP, CK, INR/PT, PTT, D-dimer, fibrinogen, antithrombin, ferritin, CRP, IL-6) and labs notable for elevated d-dimer and CRP and lactic acid   - Imaging: chest CT with contrast showed Covid pneumonia, no PE  - Breathing treatments: Combivent inhaler four times a day and PRN; avoid nebulizers in favor of MDIs   - IV fluids: ordered at a rate of 50 mL/hr; hydrate cautiously to avoid worsening respiratory status with volume overload.  Run for 10 hours and re-assess  - Antibiotics: not indicated  - check procal and if high consider  - COVID-Focused Medications: Dexamethasone 6 mg x 10 days or until hospital discharge, started on 1/8/2022 and Remdesivir x 5 days or until hospital discharge, started on 1/8/2022; monitor LFT and Cr  - DVT Prophylaxis: at high risk of thrombotic complications due to COVID-19 (DDimer = 2.45 ug/mL FEU (Ref range: 0.00 - 0.50 ug/mL FEU) ).          - PROPHYLACTIC dosing: lovenox 40mg daily        - consider anticoag on discharge for 30 days & until return to normal mobility    Elevated lactic acid  ---mild,  gentle IVF and recheck in am  ---checking procal but doubt infectious, suspect dehydration with ROSINA    ROSINA  ---last baseline Cr two years ago around 0.8  ---secondary to poor oral intake  --- Holding Glucophage and gentle IV fluids overnight    Macrocytic anemia  ---seen two years ago but macrocytosis now more pronounced  --- Suspect related to chronic alcohol abuse.  Trend and monitor for now.    Alcohol abuse  --- Admits to drinking heavily  rum and Cokes but states that she quit drinking a few weeks ago.   --- Not obviously going through withdrawal.  Monitor closely and consider CIWA protocol    Schizoaffective disorder  --- Home Zyprexa, Zoloft    Hypertension  --- Home amlodipine and atenolol and clonidine with hold parameters    DM-II  ---poor oral intake  ---with steroids continue full lantus  ---hold metformin  --- Diabetes order set used.  Check A1c    Hyperlipidemia--- LFTs mildly up but will hold home Zocor for now    GERD--- previous ulcer association with her alcohol abuse.  Continue PPI    Tobacco abuse--nicotine patch         Diet:  diabetic  DVT Prophylaxis: Enoxaparin (Lovenox) SQ  Carvalho Catheter: Not present  Central Lines: None  Code Status:  full code confirmed with her    Clinically Significant Risk Factors Present on Admission                   Disposition Plan   Anticipate discharge in 3-5 days  The patient's care was discussed with the Patient.    Germaine Hickey MD  [facility]  Securely message with the Vocera Web Console (learn more here)  Text page via nothingGrinder Paging/Directory      ______________________________________________________________________    Chief Complaint   Weakness, shortness of breath    History is obtained from the patient    History of Present Illness   Geovanna Huff is a 66 year old female with history of schizoaffective disorder as well as type 2 diabetes and chronic alcohol abuse and tobacco abuse who came into the emergency room department for  further work-up and evaluation of weakness in the setting of worsening shortness of breath.  Patient has known Covid infection.  She was tested she thinks on about December 28, 2021.  She has been at home.  She has noted gradual worsening of her breathing.  She is a somewhat difficult vague historian.  In the end she states she has not been eating or drinking but denies vomiting or diarrhea.  Cough has been getting worse.  She did not receive monoclonal antibodies or other treatment for her Covid infection.  She was becoming weaker.  Denies fevers or chills dysuria or increased frequency or hematuria.  She eventually called EMS and was found to be 79% on room air and presented to the emergency room department on nonrebreather mask    She is somewhat vague about her drinking history but and then states that she had been drinking heavily rum and coke up until about Faisal time.  She thinks she went through withdrawal at home and denies recent alcohol use. She smokes 2 packs/day    Review of Systems    The 10 point Review of Systems is negative other than noted in the HPI.    Past Medical History    I have reviewed this patient's medical history and updated it with pertinent information if needed.   Past Medical History:   Diagnosis Date     Alcohol abuse      Diabetes mellitus, type 2 (H)      Schizoaffective disorder, bipolar type (H)      Tobacco abuse        Past Surgical History   I have reviewed this patient's surgical history and updated it with pertinent information if needed.  Past Surgical History:   Procedure Laterality Date     RI ESOPHAGOGASTRODUODENOSCOPY TRANSORAL DIAGNOSTIC N/A 5/21/2019    Procedure: ESOPHAGOGASTRODUODENOSCOPY (EGD) with biopsies;  Surgeon: Zakia Sutton MD;  Location: [facility];  Service: Gastroenterology       Social History   I have reviewed this patient's social history and updated it with pertinent information if needed.  Social History     Tobacco Use     Smoking status:  Current Every Day Smoker     Packs/day: 2.00     Smokeless tobacco: Never Used   Substance Use Topics     Alcohol use: Yes     Comment: Alcoholic Drinks/day: 4-5 drinks a day, 1-2 bottles of rum a week     Drug use: Not Currently       Family History     {- No significant Fam Hx    Prior to Admission Medications   Prior to Admission Medications   Prescriptions Last Dose Informant Patient Reported? Taking?   OLANZapine (ZYPREXA) 15 MG tablet 1/7/2022 at hs  Yes Yes   Sig: [OLANZAPINE (ZYPREXA) 15 MG TABLET] Take 15 mg by mouth at bedtime.   amLODIPine (NORVASC) 10 MG tablet 1/8/2022 at Unknown time  Yes Yes   Sig: Take 10 mg by mouth   aspirin 81 MG EC tablet taking???  Yes No   Sig: [ASPIRIN 81 MG EC TABLET] Take 81 mg by mouth daily.   atenolol (TENORMIN) 50 MG tablet 1/8/2022 at Unknown time  Yes Yes   Sig: [ATENOLOL (TENORMIN) 50 MG TABLET] Take 50 mg by mouth daily.   cloNIDine HCl (CATAPRES) 0.2 MG tablet 1/8/2022 at x1  Yes Yes   Sig: [CLONIDINE HCL (CATAPRES) 0.2 MG TABLET] Take 0.2 mg by mouth 2 (two) times a day.   insulin glargine (LANTUS) 100 unit/mL injection 1/7/2022 at hs  Yes Yes   Sig: [INSULIN GLARGINE (LANTUS) 100 UNIT/ML INJECTION] Inject 15 Units under the skin at bedtime.   metFORMIN (GLUCOPHAGE) 1000 MG tablet 1/8/2022 at x1  Yes Yes   Sig: [METFORMIN (GLUCOPHAGE) 1000 MG TABLET] Take 1,000 mg by mouth 2 (two) times a day with meals.   omeprazole (PRILOSEC) 20 MG capsule 1/8/2022 at x1  No Yes   Sig: [OMEPRAZOLE (PRILOSEC) 20 MG CAPSULE] Take 1 capsule (20 mg total) by mouth 2 (two) times a day before meals.   sertraline (ZOLOFT) 100 MG tablet 1/8/2022 at am  Yes Yes   Sig: [SERTRALINE (ZOLOFT) 100 MG TABLET] Take 150 mg by mouth daily.   simvastatin (ZOCOR) 40 MG tablet 1/7/2022 at hs  Yes Yes   Sig: [SIMVASTATIN (ZOCOR) 40 MG TABLET] Take 40 mg by mouth at bedtime.      Facility-Administered Medications: None     Allergies   No Known Allergies    Physical Exam   Vital Signs: Temp: 97.5  F  (36.4  C) Temp src: Oral BP: (!) 147/92 Pulse: 105   Resp: 22 SpO2: 97 % O2 Device: Oxymask Oxygen Delivery: 5 LPM  Weight: 167 lbs 0 oz    General Appearance: pleasant, oxymask on and somewhat vague historian, cooperaive  Eyes: non-icteric with EOMI  HEENT: mask on  Respiratory: bilateral mild wheezing  Cardiovascular: tachuycardic without murmers  GI: soft, non-tender, no masses  Skin: no rashes or open areas, no edema  Musculoskeletal: no joint swelling  Neurologic: non-tremulous, non-focal, moving all four extremities well    Data   Data reviewed today: I reviewed all medications, new labs and imaging results over the last 24 hours. I personally reviewed the EKG tracing showing Sinus tachycardia.  No acute ST changes.  QTc 4 6.    CT Chest Pulmonary Embolism w Contrast   Final Result   IMPRESSION:   1.  Moderate bilateral pulmonary infiltrates consistent with COVID 19 pneumonitis.   2.  No pulmonary embolus.   3.  Coronary artery disease.          Recent Results (from the past 24 hour(s))   Basic metabolic panel    Collection Time: 01/08/22  4:12 PM   Result Value Ref Range    Sodium 139 136 - 145 mmol/L    Potassium 3.5 3.5 - 5.0 mmol/L    Chloride 101 98 - 107 mmol/L    Carbon Dioxide (CO2) 20 (L) 22 - 31 mmol/L    Anion Gap 18 5 - 18 mmol/L    Urea Nitrogen 43 (H) 8 - 22 mg/dL    Creatinine 1.72 (H) 0.60 - 1.10 mg/dL    Calcium 9.9 8.5 - 10.5 mg/dL    Glucose 132 (H) 70 - 125 mg/dL    GFR Estimate 32 (L) >60 mL/min/1.73m2   D dimer quantitative    Collection Time: 01/08/22  4:12 PM   Result Value Ref Range    D-Dimer Quantitative 2.45 (H) 0.00 - 0.50 ug/mL FEU   Hepatic function panel    Collection Time: 01/08/22  4:12 PM   Result Value Ref Range    Bilirubin Total 0.4 0.0 - 1.0 mg/dL    Bilirubin Direct 0.2 <=0.5 mg/dL    Protein Total 7.6 6.0 - 8.0 g/dL    Albumin 2.7 (L) 3.5 - 5.0 g/dL    Alkaline Phosphatase 76 45 - 120 U/L    AST 69 (H) 0 - 40 U/L    ALT 30 0 - 45 U/L   CRP inflammation    Collection  Time: 01/08/22  4:12 PM   Result Value Ref Range    CRP 12.0 (H) 0.0-<0.8 mg/dL   Lactic acid whole blood    Collection Time: 01/08/22  4:12 PM   Result Value Ref Range    Lactic Acid 2.1 (H) 0.7 - 2.0 mmol/L   Troponin I (now)    Collection Time: 01/08/22  4:12 PM   Result Value Ref Range    Troponin I 0.24 0.00 - 0.29 ng/mL   B-Type Natriuretic Peptide ([facility] Only)    Collection Time: 01/08/22  4:12 PM   Result Value Ref Range    BNP 30 0 - 109 pg/mL   Blood gas venous    Collection Time: 01/08/22  4:12 PM   Result Value Ref Range    pH Venous 7.38 7.35 - 7.45    pCO2 Venous 40 35 - 50 mm Hg    pO2 Venous 25 25 - 47 mm Hg    Bicarbonate Venous 22 (L) 24 - 30 mmol/L    Base Excess/Deficit (+/-) -1.6   mmol/L    Oxyhemoglobin Venous 34.0 (L) 70.0 - 75.0 %    O2 Sat, Venous 34.5 (L) 70.0 - 75.0 %   INR    Collection Time: 01/08/22  4:12 PM   Result Value Ref Range    INR 1.09 0.85 - 1.15   PTT    Collection Time: 01/08/22  4:12 PM   Result Value Ref Range    aPTT 32 22 - 38 Seconds   Alcohol level blood    Collection Time: 01/08/22  4:12 PM   Result Value Ref Range    Alcohol, Blood <10 None detected mg/dL   CBC with platelets and differential    Collection Time: 01/08/22  4:12 PM   Result Value Ref Range    WBC Count 7.2 4.0 - 11.0 10e3/uL    RBC Count 2.88 (L) 3.80 - 5.20 10e6/uL    Hemoglobin 11.1 (L) 11.7 - 15.7 g/dL    Hematocrit 33.1 (L) 35.0 - 47.0 %     (H) 78 - 100 fL    MCH 38.5 (H) 26.5 - 33.0 pg    MCHC 33.5 31.5 - 36.5 g/dL    RDW 13.9 10.0 - 15.0 %    Platelet Count 233 150 - 450 10e3/uL    % Neutrophils 83 %    % Lymphocytes 9 %    % Monocytes 7 %    % Eosinophils 0 %    % Basophils 0 %    % Immature Granulocytes 1 %    NRBCs per 100 WBC 0 <1 /100    Absolute Neutrophils 6.0 1.6 - 8.3 10e3/uL    Absolute Lymphocytes 0.6 (L) 0.8 - 5.3 10e3/uL    Absolute Monocytes 0.5 0.0 - 1.3 10e3/uL    Absolute Eosinophils 0.0 0.0 - 0.7 10e3/uL    Absolute Basophils 0.0 0.0 - 0.2 10e3/uL    Absolute Immature  Granulocytes 0.1 <=0.4 10e3/uL    Absolute NRBCs 0.0 10e3/uL   Symptomatic; Unknown Influenza A/B & SARS-CoV2 (COVID-19) Virus PCR Multiplex Nasopharyngeal    Collection Time: 01/08/22  4:13 PM    Specimen: Nasopharyngeal; Swab   Result Value Ref Range    Influenza A PCR Negative Negative    Influenza B PCR Negative Negative    SARS CoV2 PCR Positive (A) Negative

## 2022-01-09 NOTE — PROGRESS NOTES
[facility]    PROGRESS NOTE - Hospitalist Service    ASSESSMENT AND PLAN     Active Problems:    Cough    Shortness of breath    Hypoxia    Acute kidney injury (H)    Elevated d-dimer    Infection due to 2019 novel coronavirus    Nonspecific elevation of levels of transaminase and lactic acid dehydrogenase (LDH)    Geovanna Huff is a 66 year old female with a history of diabetes, chronic tobacco abuse and alcohol abuse admitted to the hospital with acute respiratory failure and Covid infection     Acute hypoxic respiratory failure secondary to COVID-19 viral pneumonia    Symptom onset 12/28-unclear vaccination status.  Patient states she thinks she got J and J   Chest CT with contrast showed Covid pneumonia, no PE   Combivent inhaler four times a day and PRN   Gentle IV fluids on admission-now discontinued   Procalcitonin low.  No need for antibiotics.   Dexamethasone 6 mg x 10 days initiated 1/8/2022 and Remdesivir x 5 days Initiated on 1/8/2022   Lovenox 40mg daily    Lactic acidosis    Resolved with IV fluids     Hypokalemia   Replace per protocol    ROSINA- resolved with IV fluids     Macrocytic anemia   Had notable acute drop from hemoglobin of 11 yesterday to 7 today.  Stat repeat revealed hemoglobin of 9.  Continue to monitor.  No signs of bleeding.   Suspect related to chronic alcohol abuse.       Alcohol dependence    Admits to drinking heavily rum and Cokes but states that she quit drinking 2 weeks PTA    Most recent CIWA score of 7.  Continue to monitor.      Schizoaffective disorder   Home Zyprexa, Zoloft     Hypertension   Home amlodipine and atenolol and clonidine with hold parameters     DM-II   Poor oral intake   Continue full lantus   Hold metformin   Diabetes order set used.  A1C of 4.7      Hyperlipidemia-   LFTs mildly up- hold home Zocor for now     GERD-   Previous ulcer association with her alcohol abuse.  Continue PPI     Tobacco abuse-   Nicotine patch      DVT  Prophylaxis: Enoxaparin (Lovenox) SQ    Barriers to discharge: Hypoxia, alcohol withdrawal    Anticipated length of stay: Likely here several days    Subjective:  Patient is sleeping on exam.  Able to obtain blood consent in case patient requires blood transfusion.  No complaints from patient.    PHYSICAL EXAM  Temp:  [97  F (36.1  C)-98.7  F (37.1  C)] 98.7  F (37.1  C)  Pulse:  [] 68  Resp:  [18-31] 20  BP: (103-158)/(55-92) 103/55  SpO2:  [92 %-100 %] 93 %  Wt Readings from Last 1 Encounters:   01/08/22 75.8 kg (167 lb)       Intake/Output Summary (Last 24 hours) at 1/9/2022 1348  Last data filed at 1/9/2022 0441  Gross per 24 hour   Intake 996 ml   Output 350 ml   Net 646 ml      Body mass index is 30.54 kg/m .    GENRL: Sleepy on exam.  Denied symptoms.  No respiratory distress.  Propped up in bed   HEENT: no lymphadenopathy or thyromegaly  CHEST: Diminished throughout.  No wheezes   HEART: Regular rate and rhythm, S1S2 auscultated. No murmurs  ABDMN: Soft. Non-tender, non-distended. No organomegaly. No guarding or rigidity. Bowel sounds present   EXTRM: trace pedal edema, DP pulses 2+.  NEURO: Noted tremors.  Following commands.  Slowed mentation  PSYCH: flat affect and mood.   INTGM: No skin rash, no cyanosis or clubbing    PERTINENT LABS/IMAGING:  Results for orders placed or performed during the hospital encounter of 01/08/22   CT Chest Pulmonary Embolism w Contrast    Impression    IMPRESSION:  1.  Moderate bilateral pulmonary infiltrates consistent with COVID 19 pneumonitis.  2.  No pulmonary embolus.  3.  Coronary artery disease.     Most Recent 3 CBC's:Recent Labs   Lab Test 01/09/22  1007 01/09/22  0650 01/08/22  1612 05/22/19  0634   WBC  --  3.0* 7.2 5.0   HGB 9.0* 7.2* 11.1* 10.3*   MCV  --  116* 115* 105*   PLT  --  190 233 134*       No results for input(s): CHOL, HDL, LDL, TRIG, CHOLHDLRATIO in the last 87696 hours.  No results for input(s): LDL in the last 34523 hours.  Recent Labs   Lab  Test 01/09/22  1149 01/09/22  0918 01/09/22  0650   NA  --   --  142   POTASSIUM  --   --  3.2*   CHLORIDE  --   --  112*   CO2  --   --  22   GLC 97   < > 100   BUN  --   --  35*   CR  --   --  0.95   GFRESTIMATED  --   --  66   OSWALDO  --   --  7.5*    < > = values in this interval not displayed.     Recent Labs   Lab Test 01/08/22  1612 05/21/19  0315   A1C 4.7 4.5     Recent Labs   Lab Test 01/09/22  1007 01/09/22  0650 01/08/22  1612   HGB 9.0* 7.2* 11.1*     Recent Labs   Lab Test 01/08/22  1612 05/21/19  1451 05/21/19  0731   TROPONINI 0.24 <0.01 0.01     Recent Labs   Lab Test 01/08/22 1612 05/20/19  2202   BNP 30 65     No results for input(s): TSH in the last 99784 hours.  Recent Labs   Lab Test 01/08/22 1612 05/20/19  2217   INR 1.09 0.91       Darlene Sheets DO  [facility]  [phone]

## 2022-01-09 NOTE — ED NOTES
Per patients request updated SO Rah regarding admit to the hospital and that pt jacklyn not be able to have visitors during her hospital stay.

## 2022-01-09 NOTE — PROVIDER NOTIFICATION
PROVIDER NOTIFIED: Dr. Sanz    METHOD: text page at 102    REASON: IVF not compatible with remdesivir    OUTCOME:  LR discontinued. NS started

## 2022-01-10 ENCOUNTER — APPOINTMENT (OUTPATIENT)
Dept: RADIOLOGY | Facility: HOSPITAL | Age: 67
DRG: 208 | End: 2022-01-10
Attending: INTERNAL MEDICINE
Payer: COMMERCIAL

## 2022-01-10 LAB
ALBUMIN SERPL-MCNC: 2.1 G/DL (ref 3.5–5)
ALP SERPL-CCNC: 66 U/L (ref 45–120)
ALT SERPL W P-5'-P-CCNC: 27 U/L (ref 0–45)
ANION GAP SERPL CALCULATED.3IONS-SCNC: 9 MMOL/L (ref 5–18)
AST SERPL W P-5'-P-CCNC: 43 U/L (ref 0–40)
BILIRUB DIRECT SERPL-MCNC: 0.1 MG/DL
BILIRUB SERPL-MCNC: 0.3 MG/DL (ref 0–1)
BUN SERPL-MCNC: 31 MG/DL (ref 8–22)
C REACTIVE PROTEIN LHE: 4.8 MG/DL (ref 0–0.8)
CALCIUM SERPL-MCNC: 8.3 MG/DL (ref 8.5–10.5)
CHLORIDE BLD-SCNC: 110 MMOL/L (ref 98–107)
CO2 SERPL-SCNC: 20 MMOL/L (ref 22–31)
CREAT SERPL-MCNC: 0.86 MG/DL (ref 0.6–1.1)
D DIMER PPP FEU-MCNC: 1.45 UG/ML FEU (ref 0–0.5)
ERYTHROCYTE [DISTWIDTH] IN BLOOD BY AUTOMATED COUNT: 14.3 % (ref 10–15)
FIBRINOGEN PPP-MCNC: 569 MG/DL (ref 170–490)
GFR SERPL CREATININE-BSD FRML MDRD: 74 ML/MIN/1.73M2
GLUCOSE BLD-MCNC: 79 MG/DL (ref 70–125)
GLUCOSE BLDC GLUCOMTR-MCNC: 113 MG/DL (ref 70–99)
GLUCOSE BLDC GLUCOMTR-MCNC: 124 MG/DL (ref 70–99)
GLUCOSE BLDC GLUCOMTR-MCNC: 136 MG/DL (ref 70–99)
GLUCOSE BLDC GLUCOMTR-MCNC: 62 MG/DL (ref 70–99)
GLUCOSE BLDC GLUCOMTR-MCNC: 98 MG/DL (ref 70–99)
HCT VFR BLD AUTO: 27 % (ref 35–47)
HGB BLD-MCNC: 8.9 G/DL (ref 11.7–15.7)
MCH RBC QN AUTO: 38.2 PG (ref 26.5–33)
MCHC RBC AUTO-ENTMCNC: 33 G/DL (ref 31.5–36.5)
MCV RBC AUTO: 116 FL (ref 78–100)
PLATELET # BLD AUTO: 264 10E3/UL (ref 150–450)
POTASSIUM BLD-SCNC: 3.8 MMOL/L (ref 3.5–5)
PROT SERPL-MCNC: 5.8 G/DL (ref 6–8)
RBC # BLD AUTO: 2.33 10E6/UL (ref 3.8–5.2)
SODIUM SERPL-SCNC: 139 MMOL/L (ref 136–145)
WBC # BLD AUTO: 4.7 10E3/UL (ref 4–11)

## 2022-01-10 PROCEDURE — 71045 X-RAY EXAM CHEST 1 VIEW: CPT

## 2022-01-10 PROCEDURE — 85379 FIBRIN DEGRADATION QUANT: CPT | Performed by: FAMILY MEDICINE

## 2022-01-10 PROCEDURE — 5A09457 ASSISTANCE WITH RESPIRATORY VENTILATION, 24-96 CONSECUTIVE HOURS, CONTINUOUS POSITIVE AIRWAY PRESSURE: ICD-10-PCS | Performed by: INTERNAL MEDICINE

## 2022-01-10 PROCEDURE — 200N000001 HC R&B ICU

## 2022-01-10 PROCEDURE — 82248 BILIRUBIN DIRECT: CPT | Performed by: INTERNAL MEDICINE

## 2022-01-10 PROCEDURE — 36415 COLL VENOUS BLD VENIPUNCTURE: CPT | Performed by: FAMILY MEDICINE

## 2022-01-10 PROCEDURE — 272N000054 HC CANNULA HIGH FLOW, ADULT

## 2022-01-10 PROCEDURE — 99233 SBSQ HOSP IP/OBS HIGH 50: CPT | Performed by: INTERNAL MEDICINE

## 2022-01-10 PROCEDURE — 250N000011 HC RX IP 250 OP 636: Performed by: INTERNAL MEDICINE

## 2022-01-10 PROCEDURE — 258N000003 HC RX IP 258 OP 636: Performed by: FAMILY MEDICINE

## 2022-01-10 PROCEDURE — 272N000452 HC KIT SHRLOCK 5FR POWER PICC TRIPLE LUMEN

## 2022-01-10 PROCEDURE — 250N000013 HC RX MED GY IP 250 OP 250 PS 637: Performed by: INTERNAL MEDICINE

## 2022-01-10 PROCEDURE — 86140 C-REACTIVE PROTEIN: CPT | Performed by: FAMILY MEDICINE

## 2022-01-10 PROCEDURE — 258N000003 HC RX IP 258 OP 636: Performed by: INTERNAL MEDICINE

## 2022-01-10 PROCEDURE — 999N000215 HC STATISTIC HFNC ADULT NON-CPAP

## 2022-01-10 PROCEDURE — 82310 ASSAY OF CALCIUM: CPT | Performed by: FAMILY MEDICINE

## 2022-01-10 PROCEDURE — 250N000009 HC RX 250: Performed by: FAMILY MEDICINE

## 2022-01-10 PROCEDURE — 250N000009 HC RX 250: Performed by: INTERNAL MEDICINE

## 2022-01-10 PROCEDURE — 250N000011 HC RX IP 250 OP 636: Performed by: FAMILY MEDICINE

## 2022-01-10 PROCEDURE — 94660 CPAP INITIATION&MGMT: CPT

## 2022-01-10 PROCEDURE — 99291 CRITICAL CARE FIRST HOUR: CPT | Performed by: INTERNAL MEDICINE

## 2022-01-10 PROCEDURE — 85027 COMPLETE CBC AUTOMATED: CPT | Performed by: FAMILY MEDICINE

## 2022-01-10 PROCEDURE — 85384 FIBRINOGEN ACTIVITY: CPT | Performed by: FAMILY MEDICINE

## 2022-01-10 PROCEDURE — 999N000157 HC STATISTIC RCP TIME EA 10 MIN

## 2022-01-10 PROCEDURE — 250N000013 HC RX MED GY IP 250 OP 250 PS 637: Performed by: FAMILY MEDICINE

## 2022-01-10 PROCEDURE — 36569 INSJ PICC 5 YR+ W/O IMAGING: CPT

## 2022-01-10 RX ORDER — NICOTINE POLACRILEX 4 MG
15-30 LOZENGE BUCCAL
Status: DISCONTINUED | OUTPATIENT
Start: 2022-01-10 | End: 2022-01-10

## 2022-01-10 RX ORDER — DEXTROSE MONOHYDRATE 25 G/50ML
25-50 INJECTION, SOLUTION INTRAVENOUS
Status: DISCONTINUED | OUTPATIENT
Start: 2022-01-10 | End: 2022-01-10

## 2022-01-10 RX ORDER — LIDOCAINE 40 MG/G
CREAM TOPICAL
Status: DISCONTINUED | OUTPATIENT
Start: 2022-01-10 | End: 2022-01-10 | Stop reason: CLARIF

## 2022-01-10 RX ORDER — HYDRALAZINE HYDROCHLORIDE 20 MG/ML
10-20 INJECTION INTRAMUSCULAR; INTRAVENOUS EVERY 6 HOURS PRN
Status: DISCONTINUED | OUTPATIENT
Start: 2022-01-10 | End: 2022-01-26

## 2022-01-10 RX ADMIN — OMEPRAZOLE 20 MG: 20 CAPSULE, DELAYED RELEASE ORAL at 09:18

## 2022-01-10 RX ADMIN — ENOXAPARIN SODIUM 40 MG: 40 INJECTION SUBCUTANEOUS at 20:52

## 2022-01-10 RX ADMIN — TOCILIZUMAB 600 MG: 20 INJECTION, SOLUTION, CONCENTRATE INTRAVENOUS at 12:30

## 2022-01-10 RX ADMIN — IPRATROPIUM BROMIDE AND ALBUTEROL 2 PUFF: 20; 100 SPRAY, METERED RESPIRATORY (INHALATION) at 09:24

## 2022-01-10 RX ADMIN — MULTIPLE VITAMINS W/ MINERALS TAB 1 TABLET: TAB at 09:18

## 2022-01-10 RX ADMIN — AMLODIPINE BESYLATE 10 MG: 5 TABLET ORAL at 09:17

## 2022-01-10 RX ADMIN — SERTRALINE HYDROCHLORIDE 150 MG: 50 TABLET ORAL at 09:17

## 2022-01-10 RX ADMIN — CLONIDINE HYDROCHLORIDE 0.2 MG: 0.1 TABLET ORAL at 09:17

## 2022-01-10 RX ADMIN — HYDRALAZINE HYDROCHLORIDE 10 MG: 20 INJECTION INTRAMUSCULAR; INTRAVENOUS at 22:34

## 2022-01-10 RX ADMIN — SODIUM CHLORIDE 50 ML: 9 INJECTION, SOLUTION INTRAVENOUS at 18:01

## 2022-01-10 RX ADMIN — LIDOCAINE HYDROCHLORIDE 2 ML: 10 INJECTION, SOLUTION EPIDURAL; INFILTRATION; INTRACAUDAL; PERINEURAL at 14:20

## 2022-01-10 RX ADMIN — DEXAMETHASONE SODIUM PHOSPHATE 6 MG: 10 INJECTION, SOLUTION INTRAMUSCULAR; INTRAVENOUS at 12:30

## 2022-01-10 RX ADMIN — Medication 100 MG: at 09:18

## 2022-01-10 RX ADMIN — FOLIC ACID 1 MG: 1 TABLET ORAL at 09:17

## 2022-01-10 RX ADMIN — ASPIRIN 81 MG: 81 TABLET, COATED ORAL at 09:18

## 2022-01-10 RX ADMIN — ATENOLOL 50 MG: 25 TABLET ORAL at 09:17

## 2022-01-10 RX ADMIN — REMDESIVIR 100 MG: 100 INJECTION, POWDER, LYOPHILIZED, FOR SOLUTION INTRAVENOUS at 17:56

## 2022-01-10 RX ADMIN — IPRATROPIUM BROMIDE AND ALBUTEROL 2 PUFF: 20; 100 SPRAY, METERED RESPIRATORY (INHALATION) at 12:31

## 2022-01-10 ASSESSMENT — ACTIVITIES OF DAILY LIVING (ADL)
ADLS_ACUITY_SCORE: 11
ADLS_ACUITY_SCORE: 11
ADLS_ACUITY_SCORE: 7
ADLS_ACUITY_SCORE: 11
ADLS_ACUITY_SCORE: 7
ADLS_ACUITY_SCORE: 12
ADLS_ACUITY_SCORE: 7
ADLS_ACUITY_SCORE: 12
ADLS_ACUITY_SCORE: 7
ADLS_ACUITY_SCORE: 12
ADLS_ACUITY_SCORE: 7
ADLS_ACUITY_SCORE: 12
ADLS_ACUITY_SCORE: 7
ADLS_ACUITY_SCORE: 12
ADLS_ACUITY_SCORE: 7
ADLS_ACUITY_SCORE: 12
ADLS_ACUITY_SCORE: 11
ADLS_ACUITY_SCORE: 7
ADLS_ACUITY_SCORE: 12
ADLS_ACUITY_SCORE: 11
ADLS_ACUITY_SCORE: 7
ADLS_ACUITY_SCORE: 12

## 2022-01-10 NOTE — PLAN OF CARE
Problem: Gas Exchange Impaired  Goal: Optimal Gas Exchange  Intervention: Optimize Oxygenation and Ventilation  Recent Flowsheet Documentation  Head of Bed (HOB) Positioning: HOB at 30 degrees   Pt still on 6L NC during this shift. On continuous pulse oximeter. 02 sats sits at 90-93%. LS diminished

## 2022-01-10 NOTE — PROGRESS NOTES
RT Note    Patient on HFNC 50 LPM 90%. Patient self proning which is significantly improving her oxygenation.     SPO2 93-98%.   BIPAP order if needed.     RT will continue to monitor.

## 2022-01-10 NOTE — PLAN OF CARE
"./67 (BP Location: Left arm)   Pulse 79   Temp 98.3  F (36.8  C) (Oral)   Resp 20   Ht 1.575 m (5' 2\")   Wt 75.8 kg (167 lb)   SpO2 93%   BMI 30.54 kg/m      Patient oxygen saturation 93-95 on 5L nasal cannula, diminished lungs with a productive cough. Patient very lethargic this morning. CIWAs below 7 on this shifts. Potassium was 3.2 RN managed protocol followed. Continue to monitor.Opal Garibay RN    Problem: Gas Exchange Impaired  Goal: Optimal Gas Exchange  Outcome: Improving  Intervention: Optimize Oxygenation and Ventilation  Recent Flowsheet Documentation  Taken 1/9/2022 1600 by Opal Roberson RN  Head of Bed (HOB) Positioning: HOB at 30-45 degrees  Taken 1/9/2022 0959 by Opal Roberson RN  Head of Bed (HOB) Positioning: HOB at 20-30 degrees     Problem: Electrolyte Imbalance  Goal: Electrolyte Balance  Outcome: Declining     "

## 2022-01-10 NOTE — PROGRESS NOTES
Critical Care Consult  Note      01/10/2022    Name: Geovanna Huff MRN#: [id number]   Age: 66 year old YOB: 1955                    Problem List:   Active Problems:    Cough    Shortness of breath    Hypoxia    Acute kidney injury (H)    Elevated d-dimer    Infection due to 2019 novel coronavirus    Nonspecific elevation of levels of transaminase and lactic acid dehydrogenase (LDH)          HPI:     66-year-old female unsure if she is vaccinated she states she might have received the [employer] vaccine.  Past medical history is diabetes, chronic tobacco and alcohol abuse presented on 1/8 with shortness of breath. Initially placed on nasal cannula at 5 L but today she quickly escalated to high flow nasal cannula and transferred to the ICU.  She is self proning. Denies shortness of breath.      Assessment and plan :       I have personally reviewed the labs, imaging studies, cultures and discussed the case with referring physician and consulting physicians.     My assessment and plan by system for this patient is as follows:    Neurology/Psychiatry:   No issues. She is awake. Answers questions. Somewhat forgetful.  Continue thiamine and folic acid      Cardiovascular:   Vital stable. Minimize IV fluids.      Pulmonary/Ventilator Management:   Acute hypoxic respiratory failure due to COVID-19 pneumonia.  Initially on 5L nasal cannula now escalated to high flow nasal cannula 40L/80%.  Continue self proning. BiPAP as needed.      GI and Nutrition :   N.p.o. for now      Renal/Fluids/Electrolytes:     - monitor function and electrolytes as needed with replacement per ICU protocols. - generally avoid nephrotoxic agents such as NSAID, IV contrast unless specifically required  - adjust medications as needed for renal clearance  - follow I/O's as appropriate.    Infectious Disease:   COVID-19 pneumonia.  Currently on remdesivir and dexamethasone. Received Tocilizumab x1 today.      Endocrine:      Plan  - ICU insulin protocol, goal sugar <180      ICU Prophylaxis:   1. DVT: Switch Lovenox to 0.5 mg SQ twice daily  2. VAP: HOB 30 degrees, chlorhexidine rinse  3. Stress Ulcer: PPI                 Medical History:     Past Medical History:   Diagnosis Date     Alcohol abuse      Diabetes mellitus, type 2 (H)      Schizoaffective disorder, bipolar type (H)      Tobacco abuse      Past Surgical History:   Procedure Laterality Date     PICC TRIPLE LUMEN PLACEMENT  1/10/2022          NH ESOPHAGOGASTRODUODENOSCOPY TRANSORAL DIAGNOSTIC N/A 5/21/2019    Procedure: ESOPHAGOGASTRODUODENOSCOPY (EGD) with biopsies;  Surgeon: Zakia Sutton MD;  Location: [facility];  Service: Gastroenterology     Social History     Socioeconomic History     Marital status: Single     Spouse name: Not on file     Number of children: Not on file     Years of education: Not on file     Highest education level: Not on file   Occupational History     Not on file   Tobacco Use     Smoking status: Current Every Day Smoker     Packs/day: 2.00     Smokeless tobacco: Never Used   Substance and Sexual Activity     Alcohol use: Yes     Comment: Alcoholic Drinks/day: 4-5 drinks a day, 1-2 bottles of rum a week     Drug use: Not Currently     Sexual activity: Not on file   Other Topics Concern     Not on file   Social History Narrative    [place]ilves with her boyfriend.  Uses a walker     Social Determinants of Health     Financial Resource Strain: Not on file   Food Insecurity: Not on file   Transportation Needs: Not on file   Physical Activity: Not on file   Stress: Not on file   Social Connections: Not on file   Intimate Partner Violence: Not on file   Housing Stability: Not on file      No Known Allergies           Key Medications:       remdesivir  100 mg Intravenous Q24H    And     sodium chloride 0.9%  50 mL Intravenous Q24H     amLODIPine  10 mg Oral Daily     aspirin  81 mg Oral Daily     atenolol  50 mg Oral Daily     cloNIDine  0.2 mg Oral  BID     dexamethasone  6 mg Intravenous Daily     enoxaparin ANTICOAGULANT  40 mg Subcutaneous Q24H     folic acid  1 mg Oral Daily     insulin aspart  1-7 Units Subcutaneous TID AC     insulin aspart  1-5 Units Subcutaneous At Bedtime     insulin glargine  15 Units Subcutaneous At Bedtime     ipratropium-albuterol  2 puff Inhalation 4x Daily     multivitamin w/minerals  1 tablet Oral Daily     nicotine  1 patch Transdermal Q24H     nicotine   Transdermal Q8H     OLANZapine  15 mg Oral At Bedtime     omeprazole  20 mg Oral BID AC     sertraline  150 mg Oral Daily     sodium chloride (PF)  10-40 mL Intracatheter Q7 Days     sodium chloride (PF)  3 mL Intracatheter Q8H     thiamine  100 mg Oral Daily       - MEDICATION INSTRUCTIONS -          Home Meds  No current facility-administered medications on file prior to encounter.  amLODIPine (NORVASC) 10 MG tablet, Take 10 mg by mouth  atenolol (TENORMIN) 50 MG tablet, [ATENOLOL (TENORMIN) 50 MG TABLET] Take 50 mg by mouth daily.  cloNIDine HCl (CATAPRES) 0.2 MG tablet, [CLONIDINE HCL (CATAPRES) 0.2 MG TABLET] Take 0.2 mg by mouth 2 (two) times a day.  insulin glargine (LANTUS) 100 unit/mL injection, [INSULIN GLARGINE (LANTUS) 100 UNIT/ML INJECTION] Inject 15 Units under the skin at bedtime.  metFORMIN (GLUCOPHAGE) 1000 MG tablet, [METFORMIN (GLUCOPHAGE) 1000 MG TABLET] Take 1,000 mg by mouth 2 (two) times a day with meals.  OLANZapine (ZYPREXA) 15 MG tablet, [OLANZAPINE (ZYPREXA) 15 MG TABLET] Take 15 mg by mouth at bedtime.  omeprazole (PRILOSEC) 20 MG capsule, [OMEPRAZOLE (PRILOSEC) 20 MG CAPSULE] Take 1 capsule (20 mg total) by mouth 2 (two) times a day before meals.  sertraline (ZOLOFT) 100 MG tablet, [SERTRALINE (ZOLOFT) 100 MG TABLET] Take 150 mg by mouth daily.  simvastatin (ZOCOR) 40 MG tablet, [SIMVASTATIN (ZOCOR) 40 MG TABLET] Take 40 mg by mouth at bedtime.  aspirin 81 MG EC tablet, [ASPIRIN 81 MG EC TABLET] Take 81 mg by mouth daily.               Physical  Examination:   Temp:  [97.7  F (36.5  C)-98.6  F (37  C)] 98.6  F (37  C)  Pulse:  [68-73] 69  Resp:  [20] 20  BP: (111-154)/(61-78) 111/61  FiO2 (%):  [80 %-100 %] 80 %  SpO2:  [89 %-99 %] 94 %    Intake/Output Summary (Last 24 hours) at 1/10/2022 1615  Last data filed at 1/10/2022 0600  Gross per 24 hour   Intake 539 ml   Output 975 ml   Net -436 ml     Wt Readings from Last 4 Encounters:   01/08/22 75.8 kg (167 lb)   05/22/19 75.8 kg (167 lb 3.2 oz)     BP - Mean:  [106] 106  FiO2 (%): 80 %  Resp: 20    No lab results found in last 7 days.    GEN: no acute distress. Self proning.  HEENT: head ncat, sclera anicteric, OP patent, trachea midline   PULM: unlabored synchronous with vent, clear anteriorly    CV/COR: Cannot assess. Patient is proned  ABD: Cannot assess. Patient is proned  EXT: No edema  NEURO: grossly intact  SKIN: no obvious rash  LINES: clean, dry intact         Data:   All data and imaging reviewed     ROUTINE ICU LABS (Last four results)  CMP  Recent Labs   Lab 01/10/22  1151 01/10/22  0906 01/10/22  0817 01/10/22  0702 01/09/22  2317 01/09/22  0918 01/09/22  0650 01/08/22  2212 01/08/22  1612   NA  --   --   --  139  --   --  142  --  139   POTASSIUM  --   --   --  3.8 4.4  --  3.2*  --  3.5   CHLORIDE  --   --   --  110*  --   --  112*  --  101   CO2  --   --   --  20*  --   --  22  --  20*   ANIONGAP  --   --   --  9  --   --  8  --  18   GLC 98 136* 62* 79  --    < > 100   < > 132*   BUN  --   --   --  31*  --   --  35*  --  43*   CR  --   --   --  0.86  --   --  0.95  --  1.72*   GFRESTIMATED  --   --   --  74  --   --  66  --  32*   OSWALDO  --   --   --  8.3*  --   --  7.5*  --  9.9   MAG  --   --   --   --   --   --   --   --  1.4*   PROTTOTAL  --   --   --  5.8*  --   --   --   --  7.6   ALBUMIN  --   --   --  2.1*  --   --   --   --  2.7*   BILITOTAL  --   --   --  0.3  --   --   --   --  0.4   ALKPHOS  --   --   --  66  --   --   --   --  76   AST  --   --   --  43*  --   --   --   --  69*    ALT  --   --   --  27  --   --   --   --  30    < > = values in this interval not displayed.     CBC  Recent Labs   Lab 01/10/22  0702 01/09/22  1007 01/09/22  0650 01/08/22  1612   WBC 4.7  --  3.0* 7.2   RBC 2.33*  --  1.90* 2.88*   HGB 8.9* 9.0* 7.2* 11.1*   HCT 27.0*  --  22.1* 33.1*   *  --  116* 115*   MCH 38.2*  --  37.9* 38.5*   MCHC 33.0  --  32.6 33.5   RDW 14.3  --  14.0 13.9     --  190 233     INR  Recent Labs   Lab 01/08/22  1612   INR 1.09     Arterial Blood GasNo lab results found in last 7 days.    All cultures:  No results for input(s): CULT in the last 168 hours.  Recent Results (from the past 24 hour(s))   XR Chest Port 1 View    Narrative    EXAM: XR CHEST PORT 1 VIEW  LOCATION: [facility]  DATE/TIME: 1/10/2022 10:37 AM    INDICATION: worsening hypoxia  COMPARISON: Chest x-ray 05/21/2019 and CT chest 01/08/2022      Impression    IMPRESSION: Mild to moderate patchy bilateral multilobar airspace disease, most pronounced within the right upper lobe and medial lung bases and mildly progressed since the CT from 01/08/2022. No definite pleural effusion. Stable heart size.         Billing: This patient is critically ill: Yes. Total critical care time today 39 min. Managing decompensating respiratory failure secondary to COVID-19 pneumonia. High risk for intubation.

## 2022-01-10 NOTE — PROGRESS NOTES
Patient transported from P2 214 to ICU W 355 without incident. Patient tolerated transport well with no adverse reaction. Patient placed on HFNC back at 50lpm/100% FiO2. Continue to follow closely.

## 2022-01-10 NOTE — PLAN OF CARE
Arrived to unit on 12L NC. Pt has Spo2 readings in the low to mid 80s. Immediately proned patient. Pt reluctant at first. Transitioned to High Flow Nasal Cannula 50L 100%.

## 2022-01-10 NOTE — PROCEDURES
"PICC Line Insertion Procedure Note  Pt. Name: Geovanna Huff  MRN:        [id number]    Procedure: Insertion of a  triple Lumen  5 fr  Bard SOLO (valved) Power PICC, Lot number [id number]    Indications: access/covid    Contraindications : none    Procedure Details   Patient identified with 2 identifiers and \"Time Out\" conducted.  .     Central line insertion bundle followed: hand hygeine performed prior to procedure, site cleansed with cholraprep, hat, mask, sterile gloves,sterile gown worn, patient draped with maximum barrier head to toe drape, sterile field maintained.    The vein was assessed and found to be compressible and of adequate size. 2 ml 1% Lidocaine administered sq to the insertion site. A 5 Fr PICC was inserted into the BASILIC vein of the left arm with ultrasound guidance. 1 attempt(s) required to access vein.   Catheter threaded without difficulty. Good blood return noted.    Modified Seldinger Technique used for insertion.    The 8 sharps that are included in the PICC insertion kit were accounted for and disposed of in the sharps container prior to breakdown of the sterile field.    Catheter secured with Statlock, biopatch and Tegaderm dressing applied.    Findings:  Total catheter length  43 cm, with 0 cm exposed. Mid upper arm circumference is 27.5 cm. Catheter was flushed with 30 cc NS. Patient  tolerated procedure well.    Tip placement verified by [employer] . Tip placement in the SVC.      CLABSI prevention brochure left at bedside.    Patient's primary RN notified PICC is ready for use.    Comments:          @[email or web address]@,BSN  [facility]        "

## 2022-01-10 NOTE — PROGRESS NOTES
Placed patient on HFNC 50lpm/100% FiO2. Patient very sleepy and wouldn't follow directions. Notified RN and Charge RN.

## 2022-01-10 NOTE — PLAN OF CARE
"./61 (BP Location: Left arm)   Pulse 69   Temp 98.6  F (37  C) (Oral)   Resp 20   Ht 1.575 m (5' 2\")   Wt 75.8 kg (167 lb)   SpO2 94%   BMI 30.54 kg/m    Patient Oxygen saturation decreased to 85-88 % and couldn't bring oxygenation up  to the 90s. RT was called and MD was notified. R T placed patient on 50L 100% after an Hour patient was on 96- 98 %. So RT was called and able to wean patient to 80% and 40 L. Patient current more alert and awake. Picc team currently placing a PICC. patient will be transferred after procedure. ICU was called and gave nurse to nurse report for this patient. Opal Garibay RN    "

## 2022-01-10 NOTE — PLAN OF CARE
Problem: Adult Inpatient Plan of Care  Goal: Plan of Care Review  Outcome: Improving   Pt on 6L NC tolerating well. BG level at 2100 was 151. Had scheduled insulin. CIWA at 1940 was 3.

## 2022-01-10 NOTE — PROGRESS NOTES
Weaned patient on HFNC to 40lpm/80% FiO2. Patient awake, alert and able to speak in full sentences. Notified patient's RN.

## 2022-01-10 NOTE — PROGRESS NOTES
[facility]    PROGRESS NOTE - Hospitalist Service    ASSESSMENT AND PLAN     Active Problems:    Cough    Shortness of breath    Hypoxia    Acute kidney injury (H)    Elevated d-dimer    Infection due to 2019 novel coronavirus    Nonspecific elevation of levels of transaminase and lactic acid dehydrogenase (LDH)    Geovanna Huff is a 66 year old female with a history of diabetes, chronic tobacco abuse and alcohol abuse admitted to the hospital with acute respiratory failure and Covid infection     Acute hypoxic respiratory failure secondary to COVID-19 viral pneumonia-with acute worsening today requiring high flow nasal cannula              Symptom onset 12/28-unclear vaccination status.  Patient states she thinks she got J and J              Chest CT with contrast showed Covid pneumonia, no PE              Combivent inhaler four times a day and PRN              Gentle IV fluids on admission-now discontinued since 1/9              Procalcitonin low.  No need for antibiotics.              Dexamethasone 6 mg x 10 days initiated 1/8/2022 and Remdesivir x 5 days Initiated on 1/8/2022              Lovenox 40mg daily   Discussed case with intensivist who accepts patient for transfer to ICU.  Recommended stat chest x-ray, tocilizumab, and PICC placement     Lactic acidosis               Resolved with IV fluids      Hypokalemia              Replace per protocol     ROSINA- resolved with IV fluids      Macrocytic anemia              Had notable acute drop from hemoglobin of 11 on admission to 7.2 on 11/9.  Stat repeat revealed hemoglobin to be in the 9 range.  Now stable. No signs of bleeding.              Suspect related to chronic alcohol abuse.       Alcohol dependence               Admits to drinking heavily rum and Cokes but states that she quit drinking 2 weeks PTA               Most recent CIWA scores of 3, 4, 6 and 7     Schizoaffective disorder              Home Zyprexa,  Zoloft     Hypertension              Home amlodipine and atenolol and clonidine with hold parameters     DM-II              Poor oral intake              Continue lantus- adjust as needed               Hold metformin              Diabetes order set used.  A1C of 4.7      Hyperlipidemia-              LFTs mildly up- hold home Zocor for now     GERD-              Previous ulcer association with her alcohol abuse.  Continue PPI     Tobacco abuse-              Nicotine patch      DVT Prophylaxis: Enoxaparin (Lovenox) SQ     Barriers to discharge: ICU placement, Hypoxia     Anticipated length of stay: Likely here several days    Addendum: Received page from nursing at 10:12 AM noting that the patient was requiring high flow nasal cannula from 6 L nasal cannula this morning.  I notified the intensivist who agreed to transfer patient to ICU for further cares.    Subjective:  On my evaluation patient noted her breathing was difficult but otherwise she appeared pretty comfortable on 6 L nasal cannula.  Notes chest discomfort with coughing.  No other complaints.    PHYSICAL EXAM  Temp:  [97.7  F (36.5  C)-98.7  F (37.1  C)] 98.6  F (37  C)  Pulse:  [68-79] 69  Resp:  [20] 20  BP: (103-154)/(55-78) 111/61  FiO2 (%):  [100 %] 100 %  SpO2:  [89 %-99 %] 92 %  Wt Readings from Last 1 Encounters:   01/08/22 75.8 kg (167 lb)       Intake/Output Summary (Last 24 hours) at 1/10/2022 1058  Last data filed at 1/10/2022 0600  Gross per 24 hour   Intake 779 ml   Output 975 ml   Net -196 ml      Body mass index is 30.54 kg/m .    GENRL:  Alert and answering questions on exam.  No respiratory distress.  Propped up in bed   HEENT: no lymphadenopathy or thyromegaly  CHEST: Diminished throughout.  No wheezes   HEART: Regular rate and rhythm, S1S2 auscultated. No murmurs  ABDMN: Soft. Non-tender, non-distended. No organomegaly. No guarding or rigidity. Bowel sounds present   EXTRM: trace pedal edema, DP pulses 2+.  NEURO: No tremors.  Following  commands.  Slowed mentation  PSYCH: flat affect and mood.   INTGM: No skin rash, no cyanosis or clubbing    PERTINENT LABS/IMAGING:  Results for orders placed or performed during the hospital encounter of 01/08/22   CT Chest Pulmonary Embolism w Contrast    Impression    IMPRESSION:  1.  Moderate bilateral pulmonary infiltrates consistent with COVID 19 pneumonitis.  2.  No pulmonary embolus.  3.  Coronary artery disease.   XR Chest Port 1 View    Impression    IMPRESSION: Mild to moderate patchy bilateral multilobar airspace disease, most pronounced within the right upper lobe and medial lung bases and mildly progressed since the CT from 01/08/2022. No definite pleural effusion. Stable heart size.     Most Recent 3 CBC's:Recent Labs   Lab Test 01/10/22  0702 01/09/22  1007 01/09/22  0650 01/08/22  1612   WBC 4.7  --  3.0* 7.2   HGB 8.9* 9.0* 7.2* 11.1*   *  --  116* 115*     --  190 233       No results for input(s): CHOL, HDL, LDL, TRIG, CHOLHDLRATIO in the last 39694 hours.  No results for input(s): LDL in the last 11007 hours.  Recent Labs   Lab Test 01/10/22  0906 01/10/22  0817 01/10/22  0702   NA  --   --  139   POTASSIUM  --   --  3.8   CHLORIDE  --   --  110*   CO2  --   --  20*   *   < > 79   BUN  --   --  31*   CR  --   --  0.86   GFRESTIMATED  --   --  74   OSWALDO  --   --  8.3*    < > = values in this interval not displayed.     Recent Labs   Lab Test 01/08/22  1612 05/21/19  0315   A1C 4.7 4.5     Recent Labs   Lab Test 01/10/22  0702 01/09/22  1007 01/09/22  0650   HGB 8.9* 9.0* 7.2*     Recent Labs   Lab Test 01/08/22  1612 05/21/19  1451 05/21/19  0731   TROPONINI 0.24 <0.01 0.01     Recent Labs   Lab Test 01/08/22  1612 05/20/19  2202   BNP 30 65     No results for input(s): TSH in the last 03866 hours.  Recent Labs   Lab Test 01/08/22  1612 05/20/19  2217   INR 1.09 0.91       Darlene Sheets DO  [facility] Medicine Service  [phone]

## 2022-01-11 LAB
ANION GAP SERPL CALCULATED.3IONS-SCNC: 13 MMOL/L (ref 5–18)
BUN SERPL-MCNC: 23 MG/DL (ref 8–22)
C REACTIVE PROTEIN LHE: 6 MG/DL (ref 0–0.8)
CALCIUM SERPL-MCNC: 8 MG/DL (ref 8.5–10.5)
CHLORIDE BLD-SCNC: 110 MMOL/L (ref 98–107)
CO2 SERPL-SCNC: 20 MMOL/L (ref 22–31)
CREAT SERPL-MCNC: 0.82 MG/DL (ref 0.6–1.1)
D DIMER PPP FEU-MCNC: 3.64 UG/ML FEU (ref 0–0.5)
ERYTHROCYTE [DISTWIDTH] IN BLOOD BY AUTOMATED COUNT: 14 % (ref 10–15)
FIBRINOGEN PPP-MCNC: 534 MG/DL (ref 170–490)
GFR SERPL CREATININE-BSD FRML MDRD: 78 ML/MIN/1.73M2
GLUCOSE BLD-MCNC: 84 MG/DL (ref 70–125)
GLUCOSE BLDC GLUCOMTR-MCNC: 100 MG/DL (ref 70–99)
GLUCOSE BLDC GLUCOMTR-MCNC: 130 MG/DL (ref 70–99)
GLUCOSE BLDC GLUCOMTR-MCNC: 131 MG/DL (ref 70–99)
GLUCOSE BLDC GLUCOMTR-MCNC: 135 MG/DL (ref 70–99)
GLUCOSE BLDC GLUCOMTR-MCNC: 90 MG/DL (ref 70–99)
GLUCOSE BLDC GLUCOMTR-MCNC: 90 MG/DL (ref 70–99)
GLUCOSE BLDC GLUCOMTR-MCNC: 95 MG/DL (ref 70–99)
HCT VFR BLD AUTO: 29.2 % (ref 35–47)
HGB BLD-MCNC: 9.7 G/DL (ref 11.7–15.7)
MCH RBC QN AUTO: 37.9 PG (ref 26.5–33)
MCHC RBC AUTO-ENTMCNC: 33.2 G/DL (ref 31.5–36.5)
MCV RBC AUTO: 114 FL (ref 78–100)
PLATELET # BLD AUTO: 248 10E3/UL (ref 150–450)
POTASSIUM BLD-SCNC: 3.6 MMOL/L (ref 3.5–5)
RBC # BLD AUTO: 2.56 10E6/UL (ref 3.8–5.2)
SODIUM SERPL-SCNC: 143 MMOL/L (ref 136–145)
WBC # BLD AUTO: 4.6 10E3/UL (ref 4–11)

## 2022-01-11 PROCEDURE — 200N000001 HC R&B ICU

## 2022-01-11 PROCEDURE — 999N000215 HC STATISTIC HFNC ADULT NON-CPAP

## 2022-01-11 PROCEDURE — 94660 CPAP INITIATION&MGMT: CPT

## 2022-01-11 PROCEDURE — C9113 INJ PANTOPRAZOLE SODIUM, VIA: HCPCS | Performed by: INTERNAL MEDICINE

## 2022-01-11 PROCEDURE — 86140 C-REACTIVE PROTEIN: CPT | Performed by: FAMILY MEDICINE

## 2022-01-11 PROCEDURE — 258N000003 HC RX IP 258 OP 636: Performed by: FAMILY MEDICINE

## 2022-01-11 PROCEDURE — 250N000013 HC RX MED GY IP 250 OP 250 PS 637: Performed by: INTERNAL MEDICINE

## 2022-01-11 PROCEDURE — 250N000011 HC RX IP 250 OP 636: Performed by: INTERNAL MEDICINE

## 2022-01-11 PROCEDURE — 250N000009 HC RX 250: Performed by: FAMILY MEDICINE

## 2022-01-11 PROCEDURE — 250N000011 HC RX IP 250 OP 636: Performed by: FAMILY MEDICINE

## 2022-01-11 PROCEDURE — 85379 FIBRIN DEGRADATION QUANT: CPT | Performed by: FAMILY MEDICINE

## 2022-01-11 PROCEDURE — 999N000157 HC STATISTIC RCP TIME EA 10 MIN

## 2022-01-11 PROCEDURE — 85384 FIBRINOGEN ACTIVITY: CPT | Performed by: FAMILY MEDICINE

## 2022-01-11 PROCEDURE — 250N000013 HC RX MED GY IP 250 OP 250 PS 637: Performed by: FAMILY MEDICINE

## 2022-01-11 PROCEDURE — 99291 CRITICAL CARE FIRST HOUR: CPT | Performed by: INTERNAL MEDICINE

## 2022-01-11 PROCEDURE — 85027 COMPLETE CBC AUTOMATED: CPT | Performed by: FAMILY MEDICINE

## 2022-01-11 PROCEDURE — 82310 ASSAY OF CALCIUM: CPT | Performed by: FAMILY MEDICINE

## 2022-01-11 PROCEDURE — 99207 PR NON-BILLABLE SERV PER CHARTING: CPT | Performed by: INTERNAL MEDICINE

## 2022-01-11 RX ORDER — POTASSIUM CHLORIDE 29.8 MG/ML
20 INJECTION INTRAVENOUS ONCE
Status: COMPLETED | OUTPATIENT
Start: 2022-01-11 | End: 2022-01-11

## 2022-01-11 RX ADMIN — CLONIDINE HYDROCHLORIDE 0.2 MG: 0.1 TABLET ORAL at 21:29

## 2022-01-11 RX ADMIN — NICOTINE 1 PATCH: 21 PATCH, EXTENDED RELEASE TRANSDERMAL at 21:37

## 2022-01-11 RX ADMIN — ASPIRIN 81 MG: 81 TABLET, COATED ORAL at 09:37

## 2022-01-11 RX ADMIN — FOLIC ACID 1 MG: 1 TABLET ORAL at 09:46

## 2022-01-11 RX ADMIN — INSULIN GLARGINE 15 UNITS: 100 INJECTION, SOLUTION SUBCUTANEOUS at 21:41

## 2022-01-11 RX ADMIN — SERTRALINE HYDROCHLORIDE 150 MG: 50 TABLET ORAL at 09:39

## 2022-01-11 RX ADMIN — ATENOLOL 50 MG: 25 TABLET ORAL at 09:39

## 2022-01-11 RX ADMIN — SODIUM CHLORIDE 50 ML: 9 INJECTION, SOLUTION INTRAVENOUS at 18:50

## 2022-01-11 RX ADMIN — HYDRALAZINE HYDROCHLORIDE 10 MG: 20 INJECTION INTRAMUSCULAR; INTRAVENOUS at 03:09

## 2022-01-11 RX ADMIN — AMLODIPINE BESYLATE 10 MG: 5 TABLET ORAL at 09:37

## 2022-01-11 RX ADMIN — ENOXAPARIN SODIUM 40 MG: 40 INJECTION SUBCUTANEOUS at 09:46

## 2022-01-11 RX ADMIN — DEXAMETHASONE SODIUM PHOSPHATE 6 MG: 10 INJECTION, SOLUTION INTRAMUSCULAR; INTRAVENOUS at 12:49

## 2022-01-11 RX ADMIN — REMDESIVIR 100 MG: 100 INJECTION, POWDER, LYOPHILIZED, FOR SOLUTION INTRAVENOUS at 17:11

## 2022-01-11 RX ADMIN — CLONIDINE HYDROCHLORIDE 0.2 MG: 0.1 TABLET ORAL at 09:37

## 2022-01-11 RX ADMIN — POTASSIUM CHLORIDE 20 MEQ: 29.8 INJECTION INTRAVENOUS at 06:55

## 2022-01-11 RX ADMIN — OLANZAPINE 15 MG: 10 TABLET, FILM COATED ORAL at 21:29

## 2022-01-11 RX ADMIN — ENOXAPARIN SODIUM 40 MG: 40 INJECTION SUBCUTANEOUS at 21:38

## 2022-01-11 RX ADMIN — PANTOPRAZOLE SODIUM 40 MG: 40 INJECTION, POWDER, FOR SOLUTION INTRAVENOUS at 09:31

## 2022-01-11 ASSESSMENT — ACTIVITIES OF DAILY LIVING (ADL)
ADLS_ACUITY_SCORE: 13
ADLS_ACUITY_SCORE: 11
ADLS_ACUITY_SCORE: 13
ADLS_ACUITY_SCORE: 11
ADLS_ACUITY_SCORE: 13

## 2022-01-11 ASSESSMENT — MIFFLIN-ST. JEOR: SCORE: 1207.25

## 2022-01-11 NOTE — PROGRESS NOTES
Brief progress note    Intensivist managing primarily as the patient is requiring BiPAP and high flow nasal cannula.  HMS to follow peripherally    Acute hypoxic respiratory failure secondary to COVID-19 pneumonia

## 2022-01-11 NOTE — PROGRESS NOTES
Called by RN that pt was desaturating to 88% on HFNC 50L 90%. Pt was placed on BiPAP 12/6, rate 12 and 100% at 2333. Sats improved from 83% to 94% after five minutes. BS diminished, rr 33. MD aware.

## 2022-01-11 NOTE — PROGRESS NOTES
RT Note    Patient alternating between HFNC and BIPAP throughout day. Tolerated HFNC 50 LPM 85% for 5 hours.     Patient tolerating BIPAP 12/6 RR 12 75%.     RT will continue to follow.     Dannielle Steele, RT

## 2022-01-11 NOTE — PROGRESS NOTES
Critical Care progress note      01/11/2022    Name: Geovanna Huff MRN#: [id number]   Age: 66 year old YOB: 1955                    Problem List:   Active Problems:    Cough    Shortness of breath    Hypoxia    Acute kidney injury (H)    Elevated d-dimer    Infection due to 2019 novel coronavirus    Nonspecific elevation of levels of transaminase and lactic acid dehydrogenase (LDH)          HPI:     66-year-old female unsure if she is vaccinated she states she might have received the [employer] vaccine.  Past medical history is diabetes, chronic tobacco and alcohol abuse presented on 1/8 with shortness of breath. Initially placed on nasal cannula at 5 L but today she quickly escalated to high flow nasal cannula and transferred to the ICU.  She is self proning. Denies shortness of breath.      Assessment and plan :       I have personally reviewed the labs, imaging studies, cultures and discussed the case with referring physician and consulting physicians.     My assessment and plan by system for this patient is as follows:    Neurology/Psychiatry:   No issues. She is awake.  Not in good spirits today.  Answers are short and she does not want to be bothered.  Continue thiamine and folic acid      Cardiovascular:   Vital stable. Minimize IV fluids.      Pulmonary/Ventilator Management:   Acute hypoxic respiratory failure due to COVID-19 pneumonia.  Initially on 5L nasal cannula now escalated to high flow nasal cannula.  Yesterday on 80% 40 L, BiPAP overnight and now high flow nasal cannula at 75% 50 L.  Continue self proning. BiPAP as needed.      GI and Nutrition :   Okay to have liquids when on high flow nasal cannula but when asked she declined to have any p.o. intake      Renal/Fluids/Electrolytes:     - monitor function and electrolytes as needed with replacement per ICU protocols. - generally avoid nephrotoxic agents such as NSAID, IV contrast unless specifically required  - adjust  medications as needed for renal clearance  - follow I/O's as appropriate.    Infectious Disease:   COVID-19 pneumonia.  Currently on remdesivir and dexamethasone. Received Tocilizumab x1 today.      Endocrine:     Plan  - ICU insulin protocol, goal sugar <180      ICU Prophylaxis:   1. DVT: Lovenox 0.5 mg SQ twice daily  2. VAP: HOB 30 degrees, chlorhexidine rinse  3. Stress Ulcer: PPI                 Medical History:     Past Medical History:   Diagnosis Date     Alcohol abuse      Diabetes mellitus, type 2 (H)      Schizoaffective disorder, bipolar type (H)      Tobacco abuse      Past Surgical History:   Procedure Laterality Date     PICC TRIPLE LUMEN PLACEMENT  1/10/2022          SC ESOPHAGOGASTRODUODENOSCOPY TRANSORAL DIAGNOSTIC N/A 5/21/2019    Procedure: ESOPHAGOGASTRODUODENOSCOPY (EGD) with biopsies;  Surgeon: Zakia Sutton MD;  Location: [facility];  Service: Gastroenterology     Social History     Socioeconomic History     Marital status: Single     Spouse name: Not on file     Number of children: Not on file     Years of education: Not on file     Highest education level: Not on file   Occupational History     Not on file   Tobacco Use     Smoking status: Current Every Day Smoker     Packs/day: 2.00     Smokeless tobacco: Never Used   Substance and Sexual Activity     Alcohol use: Yes     Comment: Alcoholic Drinks/day: 4-5 drinks a day, 1-2 bottles of rum a week     Drug use: Not Currently     Sexual activity: Not on file   Other Topics Concern     Not on file   Social History Narrative    Lilves with her boyfriend.  Uses a walker     Social Determinants of Health     Financial Resource Strain: Not on file   Food Insecurity: Not on file   Transportation Needs: Not on file   Physical Activity: Not on file   Stress: Not on file   Social Connections: Not on file   Intimate Partner Violence: Not on file   Housing Stability: Not on file      No Known Allergies           Key Medications:       remdesivir   100 mg Intravenous Q24H    And     sodium chloride 0.9%  50 mL Intravenous Q24H     amLODIPine  10 mg Oral Daily     aspirin  81 mg Oral Daily     atenolol  50 mg Oral Daily     cloNIDine  0.2 mg Oral BID     dexamethasone  6 mg Intravenous Daily     enoxaparin ANTICOAGULANT  40 mg Subcutaneous Q12H     folic acid  1 mg Oral Daily     insulin aspart  1-6 Units Subcutaneous Q4H     insulin glargine  15 Units Subcutaneous At Bedtime     ipratropium-albuterol  2 puff Inhalation 4x Daily     multivitamin w/minerals  1 tablet Oral Daily     nicotine  1 patch Transdermal Q24H     nicotine   Transdermal Q8H     OLANZapine  15 mg Oral At Bedtime     pantoprazole (PROTONIX) IV  40 mg Intravenous Daily with breakfast     sertraline  150 mg Oral Daily     sodium chloride (PF)  10-40 mL Intracatheter Q7 Days     sodium chloride (PF)  3 mL Intracatheter Q8H     thiamine  100 mg Oral Daily       - MEDICATION INSTRUCTIONS -          Home Meds  No current facility-administered medications on file prior to encounter.  amLODIPine (NORVASC) 10 MG tablet, Take 10 mg by mouth  atenolol (TENORMIN) 50 MG tablet, [ATENOLOL (TENORMIN) 50 MG TABLET] Take 50 mg by mouth daily.  cloNIDine HCl (CATAPRES) 0.2 MG tablet, [CLONIDINE HCL (CATAPRES) 0.2 MG TABLET] Take 0.2 mg by mouth 2 (two) times a day.  insulin glargine (LANTUS) 100 unit/mL injection, [INSULIN GLARGINE (LANTUS) 100 UNIT/ML INJECTION] Inject 15 Units under the skin at bedtime.  metFORMIN (GLUCOPHAGE) 1000 MG tablet, [METFORMIN (GLUCOPHAGE) 1000 MG TABLET] Take 1,000 mg by mouth 2 (two) times a day with meals.  OLANZapine (ZYPREXA) 15 MG tablet, [OLANZAPINE (ZYPREXA) 15 MG TABLET] Take 15 mg by mouth at bedtime.  omeprazole (PRILOSEC) 20 MG capsule, [OMEPRAZOLE (PRILOSEC) 20 MG CAPSULE] Take 1 capsule (20 mg total) by mouth 2 (two) times a day before meals.  sertraline (ZOLOFT) 100 MG tablet, [SERTRALINE (ZOLOFT) 100 MG TABLET] Take 150 mg by mouth daily.  simvastatin (ZOCOR) 40 MG  tablet, [SIMVASTATIN (ZOCOR) 40 MG TABLET] Take 40 mg by mouth at bedtime.  aspirin 81 MG EC tablet, [ASPIRIN 81 MG EC TABLET] Take 81 mg by mouth daily.               Physical Examination:   Temp:  [98.2  F (36.8  C)-99.1  F (37.3  C)] 98.2  F (36.8  C)  Pulse:  [63-90] 65  Resp:  [0-37] 33  BP: (125-184)/(65-96) 125/77  FiO2 (%):  [75 %-100 %] 75 %  SpO2:  [87 %-100 %] 93 %    Intake/Output Summary (Last 24 hours) at 1/10/2022 1615  Last data filed at 1/10/2022 0600  Gross per 24 hour   Intake 539 ml   Output 975 ml   Net -436 ml     Wt Readings from Last 4 Encounters:   01/11/22 71.4 kg (157 lb 6.5 oz)   05/22/19 75.8 kg (167 lb 3.2 oz)     BP - Mean:  [] 84  FiO2 (%): 75 %  Resp: (!) 33    No lab results found in last 7 days.    GEN: no acute distress.  HEENT: head ncat, sclera anicteric, OP patent, trachea midline   PULM: unlabored, clear anteriorly    CV/COR: S1, S2 regular   ABD: Soft, nontender.  EXT: No edema  NEURO: grossly intact  SKIN: no obvious rash  LINES: clean, dry intact         Data:   All data and imaging reviewed     ROUTINE ICU LABS (Last four results)  CMP  Recent Labs   Lab 01/11/22  1249 01/11/22  0928 01/11/22  0507 01/11/22  0506 01/10/22  0817 01/10/22  0702 01/09/22  2317 01/09/22  0918 01/09/22  0650 01/08/22  2212 01/08/22  1612   NA  --   --  143  --   --  139  --   --  142  --  139   POTASSIUM  --   --  3.6  --   --  3.8 4.4  --  3.2*  --  3.5   CHLORIDE  --   --  110*  --   --  110*  --   --  112*  --  101   CO2  --   --  20*  --   --  20*  --   --  22  --  20*   ANIONGAP  --   --  13  --   --  9  --   --  8  --  18   * 95 84 90   < > 79  --    < > 100   < > 132*   BUN  --   --  23*  --   --  31*  --   --  35*  --  43*   CR  --   --  0.82  --   --  0.86  --   --  0.95  --  1.72*   GFRESTIMATED  --   --  78  --   --  74  --   --  66  --  32*   OSWALDO  --   --  8.0*  --   --  8.3*  --   --  7.5*  --  9.9   MAG  --   --   --   --   --   --   --   --   --   --  1.4*    PROTTOTAL  --   --   --   --   --  5.8*  --   --   --   --  7.6   ALBUMIN  --   --   --   --   --  2.1*  --   --   --   --  2.7*   BILITOTAL  --   --   --   --   --  0.3  --   --   --   --  0.4   ALKPHOS  --   --   --   --   --  66  --   --   --   --  76   AST  --   --   --   --   --  43*  --   --   --   --  69*   ALT  --   --   --   --   --  27  --   --   --   --  30    < > = values in this interval not displayed.     CBC  Recent Labs   Lab 01/11/22  0507 01/10/22  0702 01/09/22  1007 01/09/22  0650 01/08/22  1612   WBC 4.6 4.7  --  3.0* 7.2   RBC 2.56* 2.33*  --  1.90* 2.88*   HGB 9.7* 8.9* 9.0* 7.2* 11.1*   HCT 29.2* 27.0*  --  22.1* 33.1*   * 116*  --  116* 115*   MCH 37.9* 38.2*  --  37.9* 38.5*   MCHC 33.2 33.0  --  32.6 33.5   RDW 14.0 14.3  --  14.0 13.9    264  --  190 233     INR  Recent Labs   Lab 01/08/22  1612   INR 1.09     Arterial Blood GasNo lab results found in last 7 days.    All cultures:  No results for input(s): CULT in the last 168 hours.  Recent Results (from the past 24 hour(s))   XR Chest Port 1 View    Narrative    EXAM: XR CHEST PORT 1 VIEW  LOCATION: [facility]  DATE/TIME: 1/10/2022 10:37 AM    INDICATION: worsening hypoxia  COMPARISON: Chest x-ray 05/21/2019 and CT chest 01/08/2022      Impression    IMPRESSION: Mild to moderate patchy bilateral multilobar airspace disease, most pronounced within the right upper lobe and medial lung bases and mildly progressed since the CT from 01/08/2022. No definite pleural effusion. Stable heart size.         Billing: This patient is critically ill: Yes. Total critical care time today 35 min. Managing decompensating respiratory failure secondary to COVID-19 pneumonia. High risk for intubation.

## 2022-01-11 NOTE — PROGRESS NOTES
Pt remained on BiPAP 12/6, rate 12 and 80% throughout the night with sats in the high 90's. Respiratory rate 30-37, BS diminished. Will assess for a break to HFNC during the day.

## 2022-01-11 NOTE — PLAN OF CARE
Problem: Gas Exchange Impaired  Goal: Optimal Gas Exchange  Outcome: No Change     Dannielle Steele, RT

## 2022-01-12 LAB
ANION GAP SERPL CALCULATED.3IONS-SCNC: 11 MMOL/L (ref 5–18)
BUN SERPL-MCNC: 27 MG/DL (ref 8–22)
C REACTIVE PROTEIN LHE: 2.7 MG/DL (ref 0–0.8)
CALCIUM SERPL-MCNC: 7.9 MG/DL (ref 8.5–10.5)
CHLORIDE BLD-SCNC: 110 MMOL/L (ref 98–107)
CO2 SERPL-SCNC: 21 MMOL/L (ref 22–31)
CREAT SERPL-MCNC: 0.85 MG/DL (ref 0.6–1.1)
D DIMER PPP FEU-MCNC: 7.47 UG/ML FEU (ref 0–0.5)
ERYTHROCYTE [DISTWIDTH] IN BLOOD BY AUTOMATED COUNT: 14.5 % (ref 10–15)
FIBRINOGEN PPP-MCNC: 450 MG/DL (ref 170–490)
GFR SERPL CREATININE-BSD FRML MDRD: 75 ML/MIN/1.73M2
GLUCOSE BLD-MCNC: 80 MG/DL (ref 70–125)
GLUCOSE BLDC GLUCOMTR-MCNC: 137 MG/DL (ref 70–99)
GLUCOSE BLDC GLUCOMTR-MCNC: 163 MG/DL (ref 70–99)
GLUCOSE BLDC GLUCOMTR-MCNC: 86 MG/DL (ref 70–99)
GLUCOSE BLDC GLUCOMTR-MCNC: 93 MG/DL (ref 70–99)
GLUCOSE BLDC GLUCOMTR-MCNC: 96 MG/DL (ref 70–99)
HCT VFR BLD AUTO: 27.6 % (ref 35–47)
HGB BLD-MCNC: 9.2 G/DL (ref 11.7–15.7)
MAGNESIUM SERPL-MCNC: 1 MG/DL (ref 1.8–2.6)
MCH RBC QN AUTO: 37.6 PG (ref 26.5–33)
MCHC RBC AUTO-ENTMCNC: 33.3 G/DL (ref 31.5–36.5)
MCV RBC AUTO: 113 FL (ref 78–100)
PLATELET # BLD AUTO: 255 10E3/UL (ref 150–450)
POTASSIUM BLD-SCNC: 3.5 MMOL/L (ref 3.5–5)
RBC # BLD AUTO: 2.45 10E6/UL (ref 3.8–5.2)
SODIUM SERPL-SCNC: 142 MMOL/L (ref 136–145)
WBC # BLD AUTO: 4.6 10E3/UL (ref 4–11)

## 2022-01-12 PROCEDURE — 258N000003 HC RX IP 258 OP 636: Performed by: FAMILY MEDICINE

## 2022-01-12 PROCEDURE — 250N000011 HC RX IP 250 OP 636: Performed by: FAMILY MEDICINE

## 2022-01-12 PROCEDURE — 85379 FIBRIN DEGRADATION QUANT: CPT | Performed by: FAMILY MEDICINE

## 2022-01-12 PROCEDURE — 85384 FIBRINOGEN ACTIVITY: CPT | Performed by: FAMILY MEDICINE

## 2022-01-12 PROCEDURE — C9113 INJ PANTOPRAZOLE SODIUM, VIA: HCPCS | Performed by: INTERNAL MEDICINE

## 2022-01-12 PROCEDURE — 250N000011 HC RX IP 250 OP 636: Performed by: INTERNAL MEDICINE

## 2022-01-12 PROCEDURE — 999N000215 HC STATISTIC HFNC ADULT NON-CPAP

## 2022-01-12 PROCEDURE — 250N000009 HC RX 250: Performed by: FAMILY MEDICINE

## 2022-01-12 PROCEDURE — 200N000001 HC R&B ICU

## 2022-01-12 PROCEDURE — 83735 ASSAY OF MAGNESIUM: CPT | Performed by: INTERNAL MEDICINE

## 2022-01-12 PROCEDURE — 99207 PR NON-BILLABLE SERV PER CHARTING: CPT | Performed by: INTERNAL MEDICINE

## 2022-01-12 PROCEDURE — 94660 CPAP INITIATION&MGMT: CPT

## 2022-01-12 PROCEDURE — 86140 C-REACTIVE PROTEIN: CPT | Performed by: FAMILY MEDICINE

## 2022-01-12 PROCEDURE — 85027 COMPLETE CBC AUTOMATED: CPT | Performed by: FAMILY MEDICINE

## 2022-01-12 PROCEDURE — 250N000013 HC RX MED GY IP 250 OP 250 PS 637: Performed by: FAMILY MEDICINE

## 2022-01-12 PROCEDURE — 250N000011 HC RX IP 250 OP 636: Performed by: NURSE PRACTITIONER

## 2022-01-12 PROCEDURE — 999N000157 HC STATISTIC RCP TIME EA 10 MIN

## 2022-01-12 PROCEDURE — 80048 BASIC METABOLIC PNL TOTAL CA: CPT | Performed by: FAMILY MEDICINE

## 2022-01-12 PROCEDURE — 250N000013 HC RX MED GY IP 250 OP 250 PS 637: Performed by: INTERNAL MEDICINE

## 2022-01-12 PROCEDURE — 99291 CRITICAL CARE FIRST HOUR: CPT | Performed by: INTERNAL MEDICINE

## 2022-01-12 RX ORDER — MAGNESIUM SULFATE 4 G/50ML
4 INJECTION INTRAVENOUS EVERY 4 HOURS
Status: COMPLETED | OUTPATIENT
Start: 2022-01-12 | End: 2022-01-12

## 2022-01-12 RX ORDER — MAGNESIUM SULFATE 4 G/50ML
4 INJECTION INTRAVENOUS EVERY 4 HOURS
Status: DISPENSED | OUTPATIENT
Start: 2022-01-12 | End: 2022-01-12

## 2022-01-12 RX ADMIN — ENOXAPARIN SODIUM 40 MG: 40 INJECTION SUBCUTANEOUS at 08:00

## 2022-01-12 RX ADMIN — DEXAMETHASONE SODIUM PHOSPHATE 6 MG: 10 INJECTION, SOLUTION INTRAMUSCULAR; INTRAVENOUS at 13:57

## 2022-01-12 RX ADMIN — NICOTINE 1 PATCH: 21 PATCH, EXTENDED RELEASE TRANSDERMAL at 20:37

## 2022-01-12 RX ADMIN — ENOXAPARIN SODIUM 40 MG: 40 INJECTION SUBCUTANEOUS at 20:40

## 2022-01-12 RX ADMIN — MULTIPLE VITAMINS W/ MINERALS TAB 1 TABLET: TAB at 08:00

## 2022-01-12 RX ADMIN — MAGNESIUM SULFATE HEPTAHYDRATE 4 G: 80 INJECTION, SOLUTION INTRAVENOUS at 08:58

## 2022-01-12 RX ADMIN — ATENOLOL 50 MG: 25 TABLET ORAL at 08:01

## 2022-01-12 RX ADMIN — REMDESIVIR 100 MG: 100 INJECTION, POWDER, LYOPHILIZED, FOR SOLUTION INTRAVENOUS at 17:44

## 2022-01-12 RX ADMIN — OLANZAPINE 15 MG: 10 TABLET, FILM COATED ORAL at 20:35

## 2022-01-12 RX ADMIN — PANTOPRAZOLE SODIUM 40 MG: 40 INJECTION, POWDER, FOR SOLUTION INTRAVENOUS at 08:00

## 2022-01-12 RX ADMIN — ASPIRIN 81 MG: 81 TABLET, COATED ORAL at 08:02

## 2022-01-12 RX ADMIN — Medication 100 MG: at 08:00

## 2022-01-12 RX ADMIN — IPRATROPIUM BROMIDE AND ALBUTEROL 2 PUFF: 20; 100 SPRAY, METERED RESPIRATORY (INHALATION) at 20:35

## 2022-01-12 RX ADMIN — HYDRALAZINE HYDROCHLORIDE 20 MG: 20 INJECTION INTRAMUSCULAR; INTRAVENOUS at 06:06

## 2022-01-12 RX ADMIN — CLONIDINE HYDROCHLORIDE 0.2 MG: 0.1 TABLET ORAL at 08:00

## 2022-01-12 RX ADMIN — FOLIC ACID 1 MG: 1 TABLET ORAL at 08:00

## 2022-01-12 RX ADMIN — MAGNESIUM SULFATE HEPTAHYDRATE 4 G: 80 INJECTION, SOLUTION INTRAVENOUS at 12:27

## 2022-01-12 RX ADMIN — CLONIDINE HYDROCHLORIDE 0.2 MG: 0.1 TABLET ORAL at 20:35

## 2022-01-12 RX ADMIN — IPRATROPIUM BROMIDE AND ALBUTEROL 2 PUFF: 20; 100 SPRAY, METERED RESPIRATORY (INHALATION) at 17:43

## 2022-01-12 RX ADMIN — SERTRALINE HYDROCHLORIDE 150 MG: 50 TABLET ORAL at 08:00

## 2022-01-12 RX ADMIN — SODIUM CHLORIDE 50 ML: 9 INJECTION, SOLUTION INTRAVENOUS at 17:51

## 2022-01-12 RX ADMIN — AMLODIPINE BESYLATE 10 MG: 5 TABLET ORAL at 08:01

## 2022-01-12 RX ADMIN — INSULIN GLARGINE 15 UNITS: 100 INJECTION, SOLUTION SUBCUTANEOUS at 20:39

## 2022-01-12 ASSESSMENT — ACTIVITIES OF DAILY LIVING (ADL)
ADLS_ACUITY_SCORE: 15
ADLS_ACUITY_SCORE: 14
ADLS_ACUITY_SCORE: 13
ADLS_ACUITY_SCORE: 15
ADLS_ACUITY_SCORE: 14
ADLS_ACUITY_SCORE: 15
ADLS_ACUITY_SCORE: 14
ADLS_ACUITY_SCORE: 13
ADLS_ACUITY_SCORE: 15
ADLS_ACUITY_SCORE: 13
ADLS_ACUITY_SCORE: 14
ADLS_ACUITY_SCORE: 14
ADLS_ACUITY_SCORE: 15
ADLS_ACUITY_SCORE: 15
ADLS_ACUITY_SCORE: 14
ADLS_ACUITY_SCORE: 14
ADLS_ACUITY_SCORE: 15
ADLS_ACUITY_SCORE: 14
ADLS_ACUITY_SCORE: 14
ADLS_ACUITY_SCORE: 15
ADLS_ACUITY_SCORE: 15

## 2022-01-12 NOTE — PROGRESS NOTES
RT Note      Patient on HFNC throughout day, self proning at times.     HFNC 50 LPM 60-95% depending on whether she is proning or sitting up.     Patient utilizing CPAP when needed.     RT will continue to follow.

## 2022-01-12 NOTE — PROGRESS NOTES
Brief progress note     Intensivist managing primarily as the patient is requiring high flow nasal cannula.  HMS to follow peripherally     Acute hypoxic respiratory failure secondary to COVID-19 pneumonia

## 2022-01-12 NOTE — PLAN OF CARE
Problem: Gas Exchange Impaired  Goal: Optimal Gas Exchange  Outcome: Improving     Dannielle Steele RT

## 2022-01-12 NOTE — PROGRESS NOTES
Pt remained on BiPAP most of the night at 12/6, rate 12 and 65% (decreased from 75%). At 0530 BiPAP was alarming due to consistent Vt's over 1000 so mode was changed to CPAP 8 with Vt's at 460-530. Sats have been in the mid to high 90's, rr 25 to 32 with diminished BS. Plan to trial HFNC again today.

## 2022-01-12 NOTE — PROGRESS NOTES
Critical Care progress note      01/12/2022    Name: Geovanna Huff MRN#: [id number]   Age: 66 year old YOB: 1955                    Problem List:   Active Problems:    Cough    Shortness of breath    Hypoxia    Acute kidney injury (H)    Elevated d-dimer    Infection due to 2019 novel coronavirus    Nonspecific elevation of levels of transaminase and lactic acid dehydrogenase (LDH)  Hypomagnesemia        HPI:     66-year-old female unsure if she is vaccinated she states she might have received the [employer] vaccine.  Past medical history is diabetes, chronic tobacco and alcohol abuse presented on 1/8 with shortness of breath. Initially placed on nasal cannula at 5 L but today she quickly escalated to high flow nasal cannula and transferred to the ICU.  She is self proning. Denies shortness of breath.      Assessment and plan :       I have personally reviewed the labs, imaging studies, cultures and discussed the case with referring physician and consulting physicians.     My assessment and plan by system for this patient is as follows:    Neurology/Psychiatry:   No issues. She is awake. Continue thiamine and folic acid      Cardiovascular:   Vital stable. Minimize IV fluids.      Pulmonary/Ventilator Management:   Acute hypoxic respiratory failure due to COVID-19 pneumonia.  Initially on 5L nasal cannula now escalated to high flow nasal cannula.    On BiPAP overnight but high tidal volumes.  Switch to CPAP and she tolerated that much better.  Currently on high flow nasal cannula 70% 50 L which is much better comparing to yesterday.    GI and Nutrition :   Okay to have full liquid diet when on high flow nasal cannula.    Renal/Fluids/Electrolytes:   Hypomagnesemia.  Replace per protocol.  - monitor function and electrolytes as needed with replacement per ICU protocols. - generally avoid nephrotoxic agents such as NSAID, IV contrast unless specifically required  - adjust medications as needed  for renal clearance  - follow I/O's as appropriate.    Infectious Disease:   COVID-19 pneumonia.  Currently on remdesivir and dexamethasone. Received Tocilizumab x1 today.      Endocrine:     Plan  - ICU insulin protocol, goal sugar <180      ICU Prophylaxis:   1. DVT: Lovenox 0.5 mg SQ twice daily  2. VAP: HOB 30 degrees, chlorhexidine rinse  3. Stress Ulcer: PPI                 Medical History:     Past Medical History:   Diagnosis Date     Alcohol abuse      Diabetes mellitus, type 2 (H)      Schizoaffective disorder, bipolar type (H)      Tobacco abuse      Past Surgical History:   Procedure Laterality Date     PICC TRIPLE LUMEN PLACEMENT  1/10/2022          CA ESOPHAGOGASTRODUODENOSCOPY TRANSORAL DIAGNOSTIC N/A 5/21/2019    Procedure: ESOPHAGOGASTRODUODENOSCOPY (EGD) with biopsies;  Surgeon: Zakia Sutton MD;  Location: [facility];  Service: Gastroenterology     Social History     Socioeconomic History     Marital status: Single     Spouse name: Not on file     Number of children: Not on file     Years of education: Not on file     Highest education level: Not on file   Occupational History     Not on file   Tobacco Use     Smoking status: Current Every Day Smoker     Packs/day: 2.00     Smokeless tobacco: Never Used   Substance and Sexual Activity     Alcohol use: Yes     Comment: Alcoholic Drinks/day: 4-5 drinks a day, 1-2 bottles of rum a week     Drug use: Not Currently     Sexual activity: Not on file   Other Topics Concern     Not on file   Social History Narrative    Lilves with her boyfriend.  Uses a walker     Social Determinants of Health     Financial Resource Strain: Not on file   Food Insecurity: Not on file   Transportation Needs: Not on file   Physical Activity: Not on file   Stress: Not on file   Social Connections: Not on file   Intimate Partner Violence: Not on file   Housing Stability: Not on file      No Known Allergies           Key Medications:       remdesivir  100 mg Intravenous Q24H     And     sodium chloride 0.9%  50 mL Intravenous Q24H     amLODIPine  10 mg Oral Daily     aspirin  81 mg Oral Daily     atenolol  50 mg Oral Daily     cloNIDine  0.2 mg Oral BID     dexamethasone  6 mg Intravenous Daily     enoxaparin ANTICOAGULANT  40 mg Subcutaneous Q12H     folic acid  1 mg Oral Daily     insulin aspart  1-6 Units Subcutaneous Q4H     insulin glargine  15 Units Subcutaneous At Bedtime     ipratropium-albuterol  2 puff Inhalation 4x Daily     magnesium sulfate  4 g Intravenous Q4H     magnesium sulfate  4 g Intravenous Q4H     multivitamin w/minerals  1 tablet Oral Daily     nicotine  1 patch Transdermal Q24H     nicotine   Transdermal Q8H     OLANZapine  15 mg Oral At Bedtime     pantoprazole (PROTONIX) IV  40 mg Intravenous Daily with breakfast     sertraline  150 mg Oral Daily     sodium chloride (PF)  10-40 mL Intracatheter Q7 Days     sodium chloride (PF)  3 mL Intracatheter Q8H     thiamine  100 mg Oral Daily       - MEDICATION INSTRUCTIONS -          Home Meds  No current facility-administered medications on file prior to encounter.  amLODIPine (NORVASC) 10 MG tablet, Take 10 mg by mouth  atenolol (TENORMIN) 50 MG tablet, [ATENOLOL (TENORMIN) 50 MG TABLET] Take 50 mg by mouth daily.  cloNIDine HCl (CATAPRES) 0.2 MG tablet, [CLONIDINE HCL (CATAPRES) 0.2 MG TABLET] Take 0.2 mg by mouth 2 (two) times a day.  insulin glargine (LANTUS) 100 unit/mL injection, [INSULIN GLARGINE (LANTUS) 100 UNIT/ML INJECTION] Inject 15 Units under the skin at bedtime.  metFORMIN (GLUCOPHAGE) 1000 MG tablet, [METFORMIN (GLUCOPHAGE) 1000 MG TABLET] Take 1,000 mg by mouth 2 (two) times a day with meals.  OLANZapine (ZYPREXA) 15 MG tablet, [OLANZAPINE (ZYPREXA) 15 MG TABLET] Take 15 mg by mouth at bedtime.  omeprazole (PRILOSEC) 20 MG capsule, [OMEPRAZOLE (PRILOSEC) 20 MG CAPSULE] Take 1 capsule (20 mg total) by mouth 2 (two) times a day before meals.  sertraline (ZOLOFT) 100 MG tablet, [SERTRALINE (ZOLOFT) 100 MG  TABLET] Take 150 mg by mouth daily.  simvastatin (ZOCOR) 40 MG tablet, [SIMVASTATIN (ZOCOR) 40 MG TABLET] Take 40 mg by mouth at bedtime.  aspirin 81 MG EC tablet, [ASPIRIN 81 MG EC TABLET] Take 81 mg by mouth daily.               Physical Examination:   Temp:  [98.3  F (36.8  C)-99  F (37.2  C)] 98.3  F (36.8  C)  Pulse:  [60-77] 62  Resp:  [11-45] 27  BP: (120-183)/(56-93) 120/56  FiO2 (%):  [65 %-95 %] 70 %  SpO2:  [85 %-99 %] 95 %    Intake/Output Summary (Last 24 hours) at 1/10/2022 1615  Last data filed at 1/10/2022 0600  Gross per 24 hour   Intake 539 ml   Output 975 ml   Net -436 ml     Wt Readings from Last 4 Encounters:   01/11/22 71.4 kg (157 lb 6.5 oz)   05/22/19 75.8 kg (167 lb 3.2 oz)     BP - Mean:  [] 81  FiO2 (%): (S) 70 %  Resp: 27    No lab results found in last 7 days.    GEN: no acute distress.  Self proning  HEENT: head ncat, sclera anicteric, OP patent, trachea midline   PULM: unlabored, clear bilaterally  CV/COR: Self proning.  Cannot assess  ABD: Self proning.  Cannot assess  EXT: No edema  NEURO: grossly intact  SKIN: no obvious rash  LINES: clean, dry intact         Data:   All data and imaging reviewed     ROUTINE ICU LABS (Last four results)  CMP  Recent Labs   Lab 01/12/22  0753 01/12/22  0457 01/12/22  0455 01/11/22  2344 01/11/22  0928 01/11/22  0507 01/10/22  0817 01/10/22  0702 01/09/22  2317 01/09/22  0918 01/09/22  0650 01/08/22  2212 01/08/22  1612   NA  --  142  --   --   --  143  --  139  --   --  142  --  139   POTASSIUM  --  3.5  --   --   --  3.6  --  3.8 4.4  --  3.2*  --  3.5   CHLORIDE  --  110*  --   --   --  110*  --  110*  --   --  112*  --  101   CO2  --  21*  --   --   --  20*  --  20*  --   --  22  --  20*   ANIONGAP  --  11  --   --   --  13  --  9  --   --  8  --  18   GLC 96 80 86 135*   < > 84   < > 79  --    < > 100   < > 132*   BUN  --  27*  --   --   --  23*  --  31*  --   --  35*  --  43*   CR  --  0.85  --   --   --  0.82  --  0.86  --   --  0.95  --   1.72*   GFRESTIMATED  --  75  --   --   --  78  --  74  --   --  66  --  32*   OSWALDO  --  7.9*  --   --   --  8.0*  --  8.3*  --   --  7.5*  --  9.9   MAG  --  1.0*  --   --   --   --   --   --   --   --   --   --  1.4*   PROTTOTAL  --   --   --   --   --   --   --  5.8*  --   --   --   --  7.6   ALBUMIN  --   --   --   --   --   --   --  2.1*  --   --   --   --  2.7*   BILITOTAL  --   --   --   --   --   --   --  0.3  --   --   --   --  0.4   ALKPHOS  --   --   --   --   --   --   --  66  --   --   --   --  76   AST  --   --   --   --   --   --   --  43*  --   --   --   --  69*   ALT  --   --   --   --   --   --   --  27  --   --   --   --  30    < > = values in this interval not displayed.     CBC  Recent Labs   Lab 01/12/22  0457 01/11/22  0507 01/10/22  0702 01/09/22  1007 01/09/22  0650   WBC 4.6 4.6 4.7  --  3.0*   RBC 2.45* 2.56* 2.33*  --  1.90*   HGB 9.2* 9.7* 8.9* 9.0* 7.2*   HCT 27.6* 29.2* 27.0*  --  22.1*   * 114* 116*  --  116*   MCH 37.6* 37.9* 38.2*  --  37.9*   MCHC 33.3 33.2 33.0  --  32.6   RDW 14.5 14.0 14.3  --  14.0    248 264  --  190     INR  Recent Labs   Lab 01/08/22  1612   INR 1.09     Arterial Blood GasNo lab results found in last 7 days.    All cultures:  No results for input(s): CULT in the last 168 hours.  Recent Results (from the past 24 hour(s))   XR Chest Port 1 View    Narrative    EXAM: XR CHEST PORT 1 VIEW  LOCATION: [facility]  DATE/TIME: 1/10/2022 10:37 AM    INDICATION: worsening hypoxia  COMPARISON: Chest x-ray 05/21/2019 and CT chest 01/08/2022      Impression    IMPRESSION: Mild to moderate patchy bilateral multilobar airspace disease, most pronounced within the right upper lobe and medial lung bases and mildly progressed since the CT from 01/08/2022. No definite pleural effusion. Stable heart size.         Billing: This patient is critically ill: Yes. Total critical care time today 33 min. Managing decompensating respiratory failure  secondary to COVID-19 pneumonia. High risk for intubation.

## 2022-01-13 LAB
ANION GAP SERPL CALCULATED.3IONS-SCNC: 9 MMOL/L (ref 5–18)
BACTERIA BLD CULT: NO GROWTH
BACTERIA BLD CULT: NO GROWTH
BUN SERPL-MCNC: 22 MG/DL (ref 8–22)
CALCIUM SERPL-MCNC: 7.5 MG/DL (ref 8.5–10.5)
CHLORIDE BLD-SCNC: 110 MMOL/L (ref 98–107)
CO2 SERPL-SCNC: 23 MMOL/L (ref 22–31)
CREAT SERPL-MCNC: 0.88 MG/DL (ref 0.6–1.1)
ERYTHROCYTE [DISTWIDTH] IN BLOOD BY AUTOMATED COUNT: 14.6 % (ref 10–15)
GFR SERPL CREATININE-BSD FRML MDRD: 72 ML/MIN/1.73M2
GLUCOSE BLD-MCNC: 85 MG/DL (ref 70–125)
GLUCOSE BLDC GLUCOMTR-MCNC: 101 MG/DL (ref 70–99)
GLUCOSE BLDC GLUCOMTR-MCNC: 106 MG/DL (ref 70–99)
GLUCOSE BLDC GLUCOMTR-MCNC: 108 MG/DL (ref 70–99)
GLUCOSE BLDC GLUCOMTR-MCNC: 79 MG/DL (ref 70–99)
GLUCOSE BLDC GLUCOMTR-MCNC: 82 MG/DL (ref 70–99)
GLUCOSE BLDC GLUCOMTR-MCNC: 86 MG/DL (ref 70–99)
GLUCOSE BLDC GLUCOMTR-MCNC: 87 MG/DL (ref 70–99)
HCT VFR BLD AUTO: 24.6 % (ref 35–47)
HGB BLD-MCNC: 8.2 G/DL (ref 11.7–15.7)
MAGNESIUM SERPL-MCNC: 2.7 MG/DL (ref 1.8–2.6)
MCH RBC QN AUTO: 38 PG (ref 26.5–33)
MCHC RBC AUTO-ENTMCNC: 33.3 G/DL (ref 31.5–36.5)
MCV RBC AUTO: 114 FL (ref 78–100)
PLATELET # BLD AUTO: 225 10E3/UL (ref 150–450)
POTASSIUM BLD-SCNC: 3.3 MMOL/L (ref 3.5–5)
POTASSIUM BLD-SCNC: 4.3 MMOL/L (ref 3.5–5)
RBC # BLD AUTO: 2.16 10E6/UL (ref 3.8–5.2)
SODIUM SERPL-SCNC: 142 MMOL/L (ref 136–145)
WBC # BLD AUTO: 4.4 10E3/UL (ref 4–11)

## 2022-01-13 PROCEDURE — C9113 INJ PANTOPRAZOLE SODIUM, VIA: HCPCS | Performed by: INTERNAL MEDICINE

## 2022-01-13 PROCEDURE — 250N000013 HC RX MED GY IP 250 OP 250 PS 637: Performed by: INTERNAL MEDICINE

## 2022-01-13 PROCEDURE — 94660 CPAP INITIATION&MGMT: CPT

## 2022-01-13 PROCEDURE — 250N000011 HC RX IP 250 OP 636: Performed by: INTERNAL MEDICINE

## 2022-01-13 PROCEDURE — 85027 COMPLETE CBC AUTOMATED: CPT | Performed by: FAMILY MEDICINE

## 2022-01-13 PROCEDURE — 200N000001 HC R&B ICU

## 2022-01-13 PROCEDURE — 250N000011 HC RX IP 250 OP 636: Performed by: FAMILY MEDICINE

## 2022-01-13 PROCEDURE — 999N000157 HC STATISTIC RCP TIME EA 10 MIN

## 2022-01-13 PROCEDURE — 99291 CRITICAL CARE FIRST HOUR: CPT | Performed by: INTERNAL MEDICINE

## 2022-01-13 PROCEDURE — 84132 ASSAY OF SERUM POTASSIUM: CPT | Performed by: INTERNAL MEDICINE

## 2022-01-13 PROCEDURE — 80048 BASIC METABOLIC PNL TOTAL CA: CPT | Performed by: INTERNAL MEDICINE

## 2022-01-13 PROCEDURE — 250N000013 HC RX MED GY IP 250 OP 250 PS 637: Performed by: FAMILY MEDICINE

## 2022-01-13 PROCEDURE — 83735 ASSAY OF MAGNESIUM: CPT | Performed by: NURSE PRACTITIONER

## 2022-01-13 RX ORDER — POTASSIUM CHLORIDE 29.8 MG/ML
20 INJECTION INTRAVENOUS
Status: COMPLETED | OUTPATIENT
Start: 2022-01-13 | End: 2022-01-13

## 2022-01-13 RX ADMIN — PANTOPRAZOLE SODIUM 40 MG: 40 INJECTION, POWDER, FOR SOLUTION INTRAVENOUS at 08:12

## 2022-01-13 RX ADMIN — INSULIN GLARGINE 15 UNITS: 100 INJECTION, SOLUTION SUBCUTANEOUS at 20:53

## 2022-01-13 RX ADMIN — AMLODIPINE BESYLATE 10 MG: 5 TABLET ORAL at 08:12

## 2022-01-13 RX ADMIN — IPRATROPIUM BROMIDE AND ALBUTEROL 2 PUFF: 20; 100 SPRAY, METERED RESPIRATORY (INHALATION) at 08:15

## 2022-01-13 RX ADMIN — CLONIDINE HYDROCHLORIDE 0.2 MG: 0.1 TABLET ORAL at 08:13

## 2022-01-13 RX ADMIN — ENOXAPARIN SODIUM 40 MG: 40 INJECTION SUBCUTANEOUS at 08:12

## 2022-01-13 RX ADMIN — DEXAMETHASONE SODIUM PHOSPHATE 6 MG: 10 INJECTION, SOLUTION INTRAMUSCULAR; INTRAVENOUS at 13:16

## 2022-01-13 RX ADMIN — SERTRALINE HYDROCHLORIDE 150 MG: 50 TABLET ORAL at 08:13

## 2022-01-13 RX ADMIN — NICOTINE 1 PATCH: 21 PATCH, EXTENDED RELEASE TRANSDERMAL at 20:52

## 2022-01-13 RX ADMIN — IPRATROPIUM BROMIDE AND ALBUTEROL 2 PUFF: 20; 100 SPRAY, METERED RESPIRATORY (INHALATION) at 13:16

## 2022-01-13 RX ADMIN — CLONIDINE HYDROCHLORIDE 0.2 MG: 0.1 TABLET ORAL at 20:47

## 2022-01-13 RX ADMIN — POTASSIUM CHLORIDE 20 MEQ: 29.8 INJECTION, SOLUTION INTRAVENOUS at 05:58

## 2022-01-13 RX ADMIN — IPRATROPIUM BROMIDE AND ALBUTEROL 2 PUFF: 20; 100 SPRAY, METERED RESPIRATORY (INHALATION) at 20:48

## 2022-01-13 RX ADMIN — ASPIRIN 81 MG: 81 TABLET, COATED ORAL at 08:14

## 2022-01-13 RX ADMIN — ENOXAPARIN SODIUM 40 MG: 40 INJECTION SUBCUTANEOUS at 20:47

## 2022-01-13 RX ADMIN — OLANZAPINE 15 MG: 10 TABLET, FILM COATED ORAL at 20:47

## 2022-01-13 RX ADMIN — ATENOLOL 50 MG: 25 TABLET ORAL at 08:13

## 2022-01-13 RX ADMIN — POTASSIUM CHLORIDE 20 MEQ: 29.8 INJECTION, SOLUTION INTRAVENOUS at 06:52

## 2022-01-13 RX ADMIN — IPRATROPIUM BROMIDE AND ALBUTEROL 2 PUFF: 20; 100 SPRAY, METERED RESPIRATORY (INHALATION) at 16:30

## 2022-01-13 ASSESSMENT — ACTIVITIES OF DAILY LIVING (ADL)
ADLS_ACUITY_SCORE: 14
ADLS_ACUITY_SCORE: 15
ADLS_ACUITY_SCORE: 14
ADLS_ACUITY_SCORE: 14
ADLS_ACUITY_SCORE: 15
ADLS_ACUITY_SCORE: 13
ADLS_ACUITY_SCORE: 14
ADLS_ACUITY_SCORE: 15
ADLS_ACUITY_SCORE: 14
ADLS_ACUITY_SCORE: 15
ADLS_ACUITY_SCORE: 13
ADLS_ACUITY_SCORE: 15
ADLS_ACUITY_SCORE: 14
ADLS_ACUITY_SCORE: 15
ADLS_ACUITY_SCORE: 15
ADLS_ACUITY_SCORE: 14
ADLS_ACUITY_SCORE: 14
ADLS_ACUITY_SCORE: 15
ADLS_ACUITY_SCORE: 14
ADLS_ACUITY_SCORE: 15
ADLS_ACUITY_SCORE: 15
ADLS_ACUITY_SCORE: 14
ADLS_ACUITY_SCORE: 15
ADLS_ACUITY_SCORE: 15

## 2022-01-13 NOTE — PROGRESS NOTES
Respiratory Care Note    Alternating between HFNC and V60. Currently on 100% on the HFNC with facemask sats 85-88 declining V60 now. MD notified.       Byron Masterson, RT

## 2022-01-13 NOTE — PROGRESS NOTES
Pt was on HFNC of 50L and 70% overnight until 0330 when she desatted to 83% having sips of water. Placed on CPAP 8 and 100% which brought sats back up to 95% in about three minutes. Previous to her desat, pt's sats had been between 90% and 98%. Respiratory rate between 22 and 34. BS coarse and diminished throughout. Plan to keep pt on CPAP overnight the next couple of nights before re attempting HFNC again.

## 2022-01-13 NOTE — PROGRESS NOTES
Critical Care progress note      01/13/2022    Name: Geovanna Huff MRN#: [id number]   Age: 66 year old YOB: 1955                    Problem List:   Active Problems:    Cough    Shortness of breath    Hypoxia    Acute kidney injury (H)    Elevated d-dimer    Infection due to 2019 novel coronavirus    Nonspecific elevation of levels of transaminase and lactic acid dehydrogenase (LDH)  Hypomagnesemia        HPI:     66-year-old female unsure if she is vaccinated she states she might have received the [employer] vaccine.  Past medical history is diabetes, chronic tobacco and alcohol abuse presented on 1/8 with shortness of breath. Initially placed on nasal cannula at 5 L but today she quickly escalated to high flow nasal cannula and transferred to the ICU.  She is self proning. Denies shortness of breath.      Assessment and plan :       I have personally reviewed the labs, imaging studies, cultures and discussed the case with referring physician and consulting physicians.     My assessment and plan by system for this patient is as follows:    Neurology/Psychiatry:   No issues. She is awake. Continue thiamine and folic acid      Cardiovascular:   Vital stable. Minimize IV fluids.      Pulmonary/Ventilator Management:   Acute hypoxic respiratory failure due to COVID-19 pneumonia.  Initially on 5L nasal cannula now escalated to high flow nasal cannula.    On BiPAP overnight but high tidal volumes.  Switch to CPAP and she tolerated that much better.  Currently on high flow nasal cannula 70% 50 L which is much better comparing to yesterday.    GI and Nutrition :   Okay to have full liquid diet when on high flow nasal cannula.    Renal/Fluids/Electrolytes:   Renal function stable.  I am not inclined start think any IV fluids yet.  - monitor function and electrolytes as needed with replacement per ICU protocols. - generally avoid nephrotoxic agents such as NSAID, IV contrast unless specifically  required  - adjust medications as needed for renal clearance  - follow I/O's as appropriate.    Infectious Disease:   COVID-19 pneumonia.  Currently on remdesivir and dexamethasone. Received Tocilizumab x1 today.      Endocrine:     Plan  - ICU insulin protocol, goal sugar <180      ICU Prophylaxis:   1. DVT: Lovenox 0.5 mg SQ twice daily  2. VAP: HOB 30 degrees, chlorhexidine rinse  3. Stress Ulcer: PPI    Try to contact Mr. Harmon who is listed as emergency contact but nobody is picking up phone.             Medical History:     Past Medical History:   Diagnosis Date     Alcohol abuse      Diabetes mellitus, type 2 (H)      Schizoaffective disorder, bipolar type (H)      Tobacco abuse      Past Surgical History:   Procedure Laterality Date     PICC TRIPLE LUMEN PLACEMENT  1/10/2022          [place] ESOPHAGOGASTRODUODENOSCOPY TRANSORAL DIAGNOSTIC N/A 5/21/2019    Procedure: ESOPHAGOGASTRODUODENOSCOPY (EGD) with biopsies;  Surgeon: Zakia Sutton MD;  Location: [facility];  Service: Gastroenterology     Social History     Socioeconomic History     Marital status: Single     Spouse name: Not on file     Number of children: Not on file     Years of education: Not on file     Highest education level: Not on file   Occupational History     Not on file   Tobacco Use     Smoking status: Current Every Day Smoker     Packs/day: 2.00     Smokeless tobacco: Never Used   Substance and Sexual Activity     Alcohol use: Yes     Comment: Alcoholic Drinks/day: 4-5 drinks a day, 1-2 bottles of rum a week     Drug use: Not Currently     Sexual activity: Not on file   Other Topics Concern     Not on file   Social History Narrative    [place]ilves with her boyfriend.  Uses a walker     Social Determinants of Health     Financial Resource Strain: Not on file   Food Insecurity: Not on file   Transportation Needs: Not on file   Physical Activity: Not on file   Stress: Not on file   Social Connections: Not on file   Intimate Partner Violence:  Not on file   Housing Stability: Not on file      No Known Allergies           Key Medications:       amLODIPine  10 mg Oral Daily     aspirin  81 mg Oral Daily     atenolol  50 mg Oral Daily     cloNIDine  0.2 mg Oral BID     dexamethasone  6 mg Intravenous Daily     enoxaparin ANTICOAGULANT  40 mg Subcutaneous Q12H     folic acid  1 mg Oral Daily     insulin aspart  1-6 Units Subcutaneous Q4H     insulin glargine  15 Units Subcutaneous At Bedtime     ipratropium-albuterol  2 puff Inhalation 4x Daily     multivitamin w/minerals  1 tablet Oral Daily     nicotine  1 patch Transdermal Q24H     nicotine   Transdermal Q8H     OLANZapine  15 mg Oral At Bedtime     pantoprazole (PROTONIX) IV  40 mg Intravenous Daily with breakfast     sertraline  150 mg Oral Daily     sodium chloride (PF)  10-40 mL Intracatheter Q7 Days     sodium chloride (PF)  3 mL Intracatheter Q8H       - MEDICATION INSTRUCTIONS -          Home Meds  No current facility-administered medications on file prior to encounter.  amLODIPine (NORVASC) 10 MG tablet, Take 10 mg by mouth  atenolol (TENORMIN) 50 MG tablet, [ATENOLOL (TENORMIN) 50 MG TABLET] Take 50 mg by mouth daily.  cloNIDine HCl (CATAPRES) 0.2 MG tablet, [CLONIDINE HCL (CATAPRES) 0.2 MG TABLET] Take 0.2 mg by mouth 2 (two) times a day.  insulin glargine (LANTUS) 100 unit/mL injection, [INSULIN GLARGINE (LANTUS) 100 UNIT/ML INJECTION] Inject 15 Units under the skin at bedtime.  metFORMIN (GLUCOPHAGE) 1000 MG tablet, [METFORMIN (GLUCOPHAGE) 1000 MG TABLET] Take 1,000 mg by mouth 2 (two) times a day with meals.  OLANZapine (ZYPREXA) 15 MG tablet, [OLANZAPINE (ZYPREXA) 15 MG TABLET] Take 15 mg by mouth at bedtime.  omeprazole (PRILOSEC) 20 MG capsule, [OMEPRAZOLE (PRILOSEC) 20 MG CAPSULE] Take 1 capsule (20 mg total) by mouth 2 (two) times a day before meals.  sertraline (ZOLOFT) 100 MG tablet, [SERTRALINE (ZOLOFT) 100 MG TABLET] Take 150 mg by mouth daily.  simvastatin (ZOCOR) 40 MG tablet,  [SIMVASTATIN (ZOCOR) 40 MG TABLET] Take 40 mg by mouth at bedtime.  aspirin 81 MG EC tablet, [ASPIRIN 81 MG EC TABLET] Take 81 mg by mouth daily.               Physical Examination:   Temp:  [97.6  F (36.4  C)-98.7  F (37.1  C)] 97.6  F (36.4  C)  Pulse:  [53-78] 64  Resp:  [22-41] 33  BP: ()/(48-64) 109/57  FiO2 (%):  [50 %-100 %] 50 %  SpO2:  [87 %-100 %] 93 %    Intake/Output Summary (Last 24 hours) at 1/10/2022 1615  Last data filed at 1/10/2022 0600  Gross per 24 hour   Intake 539 ml   Output 975 ml   Net -436 ml     Wt Readings from Last 4 Encounters:   01/11/22 71.4 kg (157 lb 6.5 oz)   05/22/19 75.8 kg (167 lb 3.2 oz)     BP - Mean:  [61-92] 80  FiO2 (%): 50 %  Resp: (!) 33    No lab results found in last 7 days.    GEN: no acute distress.  Self proning  HEENT: head ncat, sclera anicteric, OP patent, trachea midline   PULM: unlabored, rhonchi chris.   CV/COR: Self proning.  Cannot assess  ABD: Self proning.  Cannot assess  EXT: No edema  NEURO: grossly intact  SKIN: no obvious rash  LINES: clean, dry intact         Data:   All data and imaging reviewed     ROUTINE ICU LABS (Last four results)  CMP  Recent Labs   Lab 01/13/22  0822 01/13/22  0811 01/13/22  0452 01/13/22  0450 01/13/22  0032 01/12/22  0753 01/12/22  0457 01/11/22  0928 01/11/22  0507 01/10/22  0817 01/10/22  0702 01/08/22  2212 01/08/22  1612   NA  --   --  142  --   --   --  142  --  143  --  139   < > 139   POTASSIUM 4.3  --  3.3*  --   --   --  3.5  --  3.6  --  3.8   < > 3.5   CHLORIDE  --   --  110*  --   --   --  110*  --  110*  --  110*   < > 101   CO2  --   --  23  --   --   --  21*  --  20*  --  20*   < > 20*   ANIONGAP  --   --  9  --   --   --  11  --  13  --  9   < > 18   GLC  --  82 85 79 106*   < > 80   < > 84   < > 79   < > 132*   BUN  --   --  22  --   --   --  27*  --  23*  --  31*   < > 43*   CR  --   --  0.88  --   --   --  0.85  --  0.82  --  0.86   < > 1.72*   GFRESTIMATED  --   --  72  --   --   --  75  --  78  --  74    < > 32*   OSWALDO  --   --  7.5*  --   --   --  7.9*  --  8.0*  --  8.3*   < > 9.9   MAG  --   --  2.7*  --   --   --  1.0*  --   --   --   --   --  1.4*   PROTTOTAL  --   --   --   --   --   --   --   --   --   --  5.8*  --  7.6   ALBUMIN  --   --   --   --   --   --   --   --   --   --  2.1*  --  2.7*   BILITOTAL  --   --   --   --   --   --   --   --   --   --  0.3  --  0.4   ALKPHOS  --   --   --   --   --   --   --   --   --   --  66  --  76   AST  --   --   --   --   --   --   --   --   --   --  43*  --  69*   ALT  --   --   --   --   --   --   --   --   --   --  27  --  30    < > = values in this interval not displayed.     CBC  Recent Labs   Lab 01/13/22  0452 01/12/22  0457 01/11/22  0507 01/10/22  0702   WBC 4.4 4.6 4.6 4.7   RBC 2.16* 2.45* 2.56* 2.33*   HGB 8.2* 9.2* 9.7* 8.9*   HCT 24.6* 27.6* 29.2* 27.0*   * 113* 114* 116*   MCH 38.0* 37.6* 37.9* 38.2*   MCHC 33.3 33.3 33.2 33.0   RDW 14.6 14.5 14.0 14.3    255 248 264     INR  Recent Labs   Lab 01/08/22  1612   INR 1.09     Arterial Blood GasNo lab results found in last 7 days.    All cultures:  No results for input(s): CULT in the last 168 hours.  Recent Results (from the past 24 hour(s))   XR Chest Port 1 View    Narrative    EXAM: XR CHEST PORT 1 VIEW  LOCATION: [facility]  DATE/TIME: 1/10/2022 10:37 AM    INDICATION: worsening hypoxia  COMPARISON: Chest x-ray 05/21/2019 and CT chest 01/08/2022      Impression    IMPRESSION: Mild to moderate patchy bilateral multilobar airspace disease, most pronounced within the right upper lobe and medial lung bases and mildly progressed since the CT from 01/08/2022. No definite pleural effusion. Stable heart size.         Billing: This patient is critically ill: Yes. Total critical care time today 34 min. Managing decompensating respiratory failure secondary to COVID-19 pneumonia. High risk for intubation.

## 2022-01-13 NOTE — PLAN OF CARE
Problem: Adult Inpatient Plan of Care  Goal: Plan of Care Review  Outcome: Improving  Flowsheets (Taken 1/12/2022 2310)  Plan of Care Reviewed With: daughter  Outcome Summary:   Propofol weaned to off. Remained a RASS -3. Non-contrast head CT performed, unremarkable (see report). RASS improved to -2 following return from CT.    Skin cool & mottled on initial assessment at 16:00 warm, pale, dry w/ MAP improved to 90. Levo titrated down to 0.07.    Continued on insulin gtt on algo 4. Family update by iPad.  Progress: improving     Problem: Electrolyte Imbalance  Goal: Electrolyte Balance  Outcome: Improving   Outcome Summary:   Na & K checked q4, Na improved from 163 to 159 (see results); K=4.0 provider notified.   Progress: improving

## 2022-01-14 ENCOUNTER — ANESTHESIA EVENT (OUTPATIENT)
Dept: INTENSIVE CARE | Facility: HOSPITAL | Age: 67
DRG: 208 | End: 2022-01-14
Payer: COMMERCIAL

## 2022-01-14 ENCOUNTER — ANESTHESIA (OUTPATIENT)
Dept: INTENSIVE CARE | Facility: HOSPITAL | Age: 67
DRG: 208 | End: 2022-01-14
Payer: COMMERCIAL

## 2022-01-14 ENCOUNTER — APPOINTMENT (OUTPATIENT)
Dept: RADIOLOGY | Facility: HOSPITAL | Age: 67
DRG: 208 | End: 2022-01-14
Attending: INTERNAL MEDICINE
Payer: COMMERCIAL

## 2022-01-14 LAB
BASE EXCESS BLDA CALC-SCNC: -2.8 MMOL/L
COHGB MFR BLD: 99.8 % (ref 95–96)
ERYTHROCYTE [DISTWIDTH] IN BLOOD BY AUTOMATED COUNT: 14.6 % (ref 10–15)
GLUCOSE BLDC GLUCOMTR-MCNC: 118 MG/DL (ref 70–99)
GLUCOSE BLDC GLUCOMTR-MCNC: 122 MG/DL (ref 70–99)
GLUCOSE BLDC GLUCOMTR-MCNC: 139 MG/DL (ref 70–99)
GLUCOSE BLDC GLUCOMTR-MCNC: 73 MG/DL (ref 70–99)
GLUCOSE BLDC GLUCOMTR-MCNC: 77 MG/DL (ref 70–99)
GLUCOSE BLDC GLUCOMTR-MCNC: 88 MG/DL (ref 70–99)
HCO3 BLD-SCNC: 22 MMOL/L (ref 23–29)
HCT VFR BLD AUTO: 25 % (ref 35–47)
HGB BLD-MCNC: 8.2 G/DL (ref 11.7–15.7)
MAGNESIUM SERPL-MCNC: 1.9 MG/DL (ref 1.8–2.6)
MCH RBC QN AUTO: 37.1 PG (ref 26.5–33)
MCHC RBC AUTO-ENTMCNC: 32.8 G/DL (ref 31.5–36.5)
MCV RBC AUTO: 113 FL (ref 78–100)
O2/TOTAL GAS SETTING VFR VENT: 80 %
OXYHGB MFR BLD: >98.5 % (ref 95–96)
PCO2 BLD: 43 MM HG (ref 35–45)
PEEP: 12 CM H2O
PH BLD: 7.34 [PH] (ref 7.37–7.44)
PLATELET # BLD AUTO: 230 10E3/UL (ref 150–450)
PO2 BLD: 245 MM HG (ref 75–85)
POTASSIUM BLD-SCNC: 3.9 MMOL/L (ref 3.5–5)
RATE: 20 RR/MIN
RBC # BLD AUTO: 2.21 10E6/UL (ref 3.8–5.2)
TEMPERATURE: 37 DEGREES C
VENTILATION MODE: ABNORMAL
VENTILATOR TIDAL VOLUME: 300 ML
WBC # BLD AUTO: 5.5 10E3/UL (ref 4–11)

## 2022-01-14 PROCEDURE — 83735 ASSAY OF MAGNESIUM: CPT | Performed by: INTERNAL MEDICINE

## 2022-01-14 PROCEDURE — C9113 INJ PANTOPRAZOLE SODIUM, VIA: HCPCS | Performed by: INTERNAL MEDICINE

## 2022-01-14 PROCEDURE — 250N000009 HC RX 250: Performed by: INTERNAL MEDICINE

## 2022-01-14 PROCEDURE — 250N000013 HC RX MED GY IP 250 OP 250 PS 637: Performed by: INTERNAL MEDICINE

## 2022-01-14 PROCEDURE — 370N000003 HC ANESTHESIA WARD SERVICE

## 2022-01-14 PROCEDURE — 258N000003 HC RX IP 258 OP 636: Performed by: INTERNAL MEDICINE

## 2022-01-14 PROCEDURE — 999N000065 XR CHEST PORT 1 VIEW

## 2022-01-14 PROCEDURE — 99291 CRITICAL CARE FIRST HOUR: CPT | Performed by: INTERNAL MEDICINE

## 2022-01-14 PROCEDURE — 250N000013 HC RX MED GY IP 250 OP 250 PS 637: Performed by: FAMILY MEDICINE

## 2022-01-14 PROCEDURE — 5A1945Z RESPIRATORY VENTILATION, 24-96 CONSECUTIVE HOURS: ICD-10-PCS | Performed by: INTERNAL MEDICINE

## 2022-01-14 PROCEDURE — 82805 BLOOD GASES W/O2 SATURATION: CPT | Performed by: INTERNAL MEDICINE

## 2022-01-14 PROCEDURE — 250N000011 HC RX IP 250 OP 636: Performed by: NURSE ANESTHETIST, CERTIFIED REGISTERED

## 2022-01-14 PROCEDURE — 87070 CULTURE OTHR SPECIMN AEROBIC: CPT | Performed by: INTERNAL MEDICINE

## 2022-01-14 PROCEDURE — 85041 AUTOMATED RBC COUNT: CPT | Performed by: FAMILY MEDICINE

## 2022-01-14 PROCEDURE — 87641 MR-STAPH DNA AMP PROBE: CPT | Performed by: INTERNAL MEDICINE

## 2022-01-14 PROCEDURE — 94002 VENT MGMT INPAT INIT DAY: CPT

## 2022-01-14 PROCEDURE — 94660 CPAP INITIATION&MGMT: CPT

## 2022-01-14 PROCEDURE — 200N000001 HC R&B ICU

## 2022-01-14 PROCEDURE — 250N000009 HC RX 250: Performed by: NURSE PRACTITIONER

## 2022-01-14 PROCEDURE — 94640 AIRWAY INHALATION TREATMENT: CPT

## 2022-01-14 PROCEDURE — 84132 ASSAY OF SERUM POTASSIUM: CPT | Performed by: INTERNAL MEDICINE

## 2022-01-14 PROCEDURE — 250N000011 HC RX IP 250 OP 636: Performed by: FAMILY MEDICINE

## 2022-01-14 PROCEDURE — 250N000011 HC RX IP 250 OP 636: Performed by: INTERNAL MEDICINE

## 2022-01-14 PROCEDURE — 999N000157 HC STATISTIC RCP TIME EA 10 MIN

## 2022-01-14 RX ORDER — PROPOFOL 10 MG/ML
100 INJECTION, EMULSION INTRAVENOUS ONCE
Status: COMPLETED | OUTPATIENT
Start: 2022-01-14 | End: 2022-01-14

## 2022-01-14 RX ORDER — ASPIRIN 81 MG/1
81 TABLET, CHEWABLE ORAL DAILY
Status: DISCONTINUED | OUTPATIENT
Start: 2022-01-15 | End: 2022-01-28 | Stop reason: HOSPADM

## 2022-01-14 RX ORDER — NICOTINE POLACRILEX 4 MG
15-30 LOZENGE BUCCAL
Status: DISCONTINUED | OUTPATIENT
Start: 2022-01-14 | End: 2022-01-28 | Stop reason: HOSPADM

## 2022-01-14 RX ORDER — CEFTRIAXONE 1 G/1
1 INJECTION, POWDER, FOR SOLUTION INTRAMUSCULAR; INTRAVENOUS EVERY 24 HOURS
Status: DISCONTINUED | OUTPATIENT
Start: 2022-01-14 | End: 2022-01-14

## 2022-01-14 RX ORDER — PROPOFOL 10 MG/ML
5-75 INJECTION, EMULSION INTRAVENOUS CONTINUOUS
Status: DISCONTINUED | OUTPATIENT
Start: 2022-01-14 | End: 2022-01-16

## 2022-01-14 RX ORDER — DEXTROSE MONOHYDRATE 25 G/50ML
25-50 INJECTION, SOLUTION INTRAVENOUS
Status: DISCONTINUED | OUTPATIENT
Start: 2022-01-14 | End: 2022-01-28 | Stop reason: HOSPADM

## 2022-01-14 RX ORDER — PROPOFOL 10 MG/ML
INJECTION, EMULSION INTRAVENOUS PRN
Status: DISCONTINUED | OUTPATIENT
Start: 2022-01-14 | End: 2022-01-14

## 2022-01-14 RX ORDER — NALOXONE HYDROCHLORIDE 0.4 MG/ML
0.2 INJECTION, SOLUTION INTRAMUSCULAR; INTRAVENOUS; SUBCUTANEOUS
Status: DISCONTINUED | OUTPATIENT
Start: 2022-01-14 | End: 2022-01-28 | Stop reason: HOSPADM

## 2022-01-14 RX ORDER — VANCOMYCIN HYDROCHLORIDE 1 G/200ML
1000 INJECTION, SOLUTION INTRAVENOUS EVERY 12 HOURS
Status: DISCONTINUED | OUTPATIENT
Start: 2022-01-14 | End: 2022-01-14

## 2022-01-14 RX ORDER — METHYLPREDNISOLONE SODIUM SUCCINATE 125 MG/2ML
60 INJECTION, POWDER, LYOPHILIZED, FOR SOLUTION INTRAMUSCULAR; INTRAVENOUS EVERY 12 HOURS
Status: DISCONTINUED | OUTPATIENT
Start: 2022-01-14 | End: 2022-01-16

## 2022-01-14 RX ORDER — MULTIPLE VITAMINS W/ MINERALS TAB 9MG-400MCG
1 TAB ORAL DAILY
Status: DISCONTINUED | OUTPATIENT
Start: 2022-01-15 | End: 2022-01-28 | Stop reason: HOSPADM

## 2022-01-14 RX ORDER — ALBUTEROL SULFATE 5 MG/ML
2.5 SOLUTION RESPIRATORY (INHALATION)
Status: DISCONTINUED | OUTPATIENT
Start: 2022-01-14 | End: 2022-01-17

## 2022-01-14 RX ORDER — CHLORHEXIDINE GLUCONATE ORAL RINSE 1.2 MG/ML
15 SOLUTION DENTAL EVERY 12 HOURS
Status: DISCONTINUED | OUTPATIENT
Start: 2022-01-14 | End: 2022-01-19 | Stop reason: CLARIF

## 2022-01-14 RX ORDER — MIDAZOLAM HCL IN 0.9 % NACL/PF 1 MG/ML
1-12 PLASTIC BAG, INJECTION (ML) INTRAVENOUS CONTINUOUS
Status: DISCONTINUED | OUTPATIENT
Start: 2022-01-14 | End: 2022-01-16

## 2022-01-14 RX ORDER — NOREPINEPHRINE BITARTRATE 0.02 MG/ML
.01-.6 INJECTION, SOLUTION INTRAVENOUS CONTINUOUS
Status: DISCONTINUED | OUTPATIENT
Start: 2022-01-14 | End: 2022-01-17

## 2022-01-14 RX ORDER — MULTIVITAMIN,THERAPEUTIC
1 TABLET ORAL DAILY
Status: DISCONTINUED | OUTPATIENT
Start: 2022-01-14 | End: 2022-01-16

## 2022-01-14 RX ORDER — MAGNESIUM SULFATE HEPTAHYDRATE 40 MG/ML
2 INJECTION, SOLUTION INTRAVENOUS ONCE
Status: COMPLETED | OUTPATIENT
Start: 2022-01-14 | End: 2022-01-14

## 2022-01-14 RX ORDER — PROPOFOL 10 MG/ML
INJECTION, EMULSION INTRAVENOUS
Status: COMPLETED
Start: 2022-01-14 | End: 2022-01-14

## 2022-01-14 RX ORDER — DEXTROSE MONOHYDRATE 100 MG/ML
INJECTION, SOLUTION INTRAVENOUS CONTINUOUS PRN
Status: DISCONTINUED | OUTPATIENT
Start: 2022-01-14 | End: 2022-01-28 | Stop reason: HOSPADM

## 2022-01-14 RX ORDER — NALOXONE HYDROCHLORIDE 0.4 MG/ML
0.4 INJECTION, SOLUTION INTRAMUSCULAR; INTRAVENOUS; SUBCUTANEOUS
Status: DISCONTINUED | OUTPATIENT
Start: 2022-01-14 | End: 2022-01-28 | Stop reason: HOSPADM

## 2022-01-14 RX ADMIN — Medication 50 MCG: at 22:23

## 2022-01-14 RX ADMIN — METHYLPREDNISOLONE SODIUM SUCCINATE 62.5 MG: 125 INJECTION, POWDER, FOR SOLUTION INTRAMUSCULAR; INTRAVENOUS at 20:13

## 2022-01-14 RX ADMIN — MULTIPLE VITAMINS W/ MINERALS TAB 1 TABLET: TAB at 08:10

## 2022-01-14 RX ADMIN — PANTOPRAZOLE SODIUM 40 MG: 40 INJECTION, POWDER, FOR SOLUTION INTRAVENOUS at 08:03

## 2022-01-14 RX ADMIN — SUCCINYLCHOLINE CHLORIDE 120 MG: 20 INJECTION, SOLUTION INTRAMUSCULAR; INTRAVENOUS at 16:49

## 2022-01-14 RX ADMIN — Medication 50 MCG: at 18:36

## 2022-01-14 RX ADMIN — ALBUTEROL SULFATE 2.5 MG: 2.5 SOLUTION RESPIRATORY (INHALATION) at 23:26

## 2022-01-14 RX ADMIN — ENOXAPARIN SODIUM 40 MG: 40 INJECTION SUBCUTANEOUS at 08:04

## 2022-01-14 RX ADMIN — MIDAZOLAM 2 MG: 1 INJECTION INTRAMUSCULAR; INTRAVENOUS at 16:53

## 2022-01-14 RX ADMIN — IPRATROPIUM BROMIDE AND ALBUTEROL 2 PUFF: 20; 100 SPRAY, METERED RESPIRATORY (INHALATION) at 10:09

## 2022-01-14 RX ADMIN — PROPOFOL 100 MG: 10 INJECTION, EMULSION INTRAVENOUS at 16:49

## 2022-01-14 RX ADMIN — INSULIN GLARGINE 15 UNITS: 100 INJECTION, SOLUTION SUBCUTANEOUS at 20:18

## 2022-01-14 RX ADMIN — CHLORHEXIDINE GLUCONATE 0.12% ORAL RINSE 15 ML: 1.2 LIQUID ORAL at 20:18

## 2022-01-14 RX ADMIN — Medication 0.03 MCG/KG/MIN: at 17:38

## 2022-01-14 RX ADMIN — CLONIDINE HYDROCHLORIDE 0.2 MG: 0.1 TABLET ORAL at 08:09

## 2022-01-14 RX ADMIN — PROPOFOL 40 MCG/KG/MIN: 10 INJECTION, EMULSION INTRAVENOUS at 19:55

## 2022-01-14 RX ADMIN — ENOXAPARIN SODIUM 40 MG: 40 INJECTION SUBCUTANEOUS at 20:03

## 2022-01-14 RX ADMIN — IPRATROPIUM BROMIDE AND ALBUTEROL 2 PUFF: 20; 100 SPRAY, METERED RESPIRATORY (INHALATION) at 14:17

## 2022-01-14 RX ADMIN — ASPIRIN 81 MG: 81 TABLET, COATED ORAL at 08:09

## 2022-01-14 RX ADMIN — SERTRALINE HYDROCHLORIDE 150 MG: 50 TABLET ORAL at 08:09

## 2022-01-14 RX ADMIN — AMLODIPINE BESYLATE 10 MG: 5 TABLET ORAL at 08:10

## 2022-01-14 RX ADMIN — ATENOLOL 50 MG: 25 TABLET ORAL at 08:10

## 2022-01-14 RX ADMIN — FOLIC ACID 1 MG: 1 TABLET ORAL at 08:03

## 2022-01-14 RX ADMIN — FENTANYL CITRATE 25 MCG/HR: 50 INJECTION, SOLUTION INTRAMUSCULAR; INTRAVENOUS at 18:33

## 2022-01-14 RX ADMIN — CEFEPIME HYDROCHLORIDE 2 G: 2 INJECTION, POWDER, FOR SOLUTION INTRAVENOUS at 20:05

## 2022-01-14 RX ADMIN — PROPOFOL 100 MG: 10 INJECTION, EMULSION INTRAVENOUS at 22:43

## 2022-01-14 RX ADMIN — NICOTINE 1 PATCH: 21 PATCH, EXTENDED RELEASE TRANSDERMAL at 20:18

## 2022-01-14 RX ADMIN — MAGNESIUM SULFATE HEPTAHYDRATE 2 G: 40 INJECTION, SOLUTION INTRAVENOUS at 05:06

## 2022-01-14 RX ADMIN — OLANZAPINE 15 MG: 10 TABLET, FILM COATED ORAL at 21:00

## 2022-01-14 RX ADMIN — MULTIPLE VITAMINS W/ MINERALS TAB 1 TABLET: TAB at 15:49

## 2022-01-14 RX ADMIN — CEFTRIAXONE SODIUM 1 G: 1 INJECTION, POWDER, FOR SOLUTION INTRAMUSCULAR; INTRAVENOUS at 15:54

## 2022-01-14 RX ADMIN — DEXAMETHASONE SODIUM PHOSPHATE 6 MG: 10 INJECTION, SOLUTION INTRAMUSCULAR; INTRAVENOUS at 13:11

## 2022-01-14 RX ADMIN — ALBUTEROL SULFATE 2.5 MG: 2.5 SOLUTION RESPIRATORY (INHALATION) at 15:54

## 2022-01-14 RX ADMIN — Medication 1 MG/HR: at 18:26

## 2022-01-14 RX ADMIN — VANCOMYCIN HYDROCHLORIDE 1500 MG: 10 INJECTION, POWDER, LYOPHILIZED, FOR SOLUTION INTRAVENOUS at 20:19

## 2022-01-14 ASSESSMENT — MIFFLIN-ST. JEOR: SCORE: 1187.25

## 2022-01-14 ASSESSMENT — ACTIVITIES OF DAILY LIVING (ADL)
ADLS_ACUITY_SCORE: 13
ADLS_ACUITY_SCORE: 12
ADLS_ACUITY_SCORE: 12
ADLS_ACUITY_SCORE: 13
ADLS_ACUITY_SCORE: 13
ADLS_ACUITY_SCORE: 12
ADLS_ACUITY_SCORE: 13
ADLS_ACUITY_SCORE: 12
ADLS_ACUITY_SCORE: 13
ADLS_ACUITY_SCORE: 11
ADLS_ACUITY_SCORE: 13
ADLS_ACUITY_SCORE: 12
ADLS_ACUITY_SCORE: 11
ADLS_ACUITY_SCORE: 12
ADLS_ACUITY_SCORE: 11
ADLS_ACUITY_SCORE: 13
ADLS_ACUITY_SCORE: 12
ADLS_ACUITY_SCORE: 13

## 2022-01-14 NOTE — PLAN OF CARE
Problem: Adult Inpatient Plan of Care  Goal: Plan of Care Review  Outcome: No Change  Goal: Patient-Specific Goal (Individualized)  Outcome: No Change  Goal: Absence of Hospital-Acquired Illness or Injury  Outcome: No Change  Intervention: Identify and Manage Fall Risk  Recent Flowsheet Documentation  Taken 1/14/2022 1200 by Mariama Fleming RN  Safety Promotion/Fall Prevention:    bed alarm on    fall prevention program maintained    patient and family education    safety round/check completed  Taken 1/14/2022 0800 by Mariama Fleming RN  Safety Promotion/Fall Prevention:    bed alarm on    fall prevention program maintained    patient and family education    safety round/check completed  Intervention: Prevent Skin Injury  Recent Flowsheet Documentation  Taken 1/14/2022 1200 by Mariama Fleming RN  Body Position:    position changed independently    weight shifting  Taken 1/14/2022 1017 by Mariama Fleming RN  Body Position:    position changed independently    weight shifting  Taken 1/14/2022 0800 by Mariama Fleming RN  Body Position:    position changed independently    weight shifting  Taken 1/14/2022 0757 by Mariama Fleming RN  Body Position:    position changed independently    weight shifting  Intervention: Prevent and Manage VTE (Venous Thromboembolism) Risk  Recent Flowsheet Documentation  Taken 1/14/2022 1200 by Mariama Fleming RN  VTE Prevention/Management: anticoagulant therapy maintained  Taken 1/14/2022 0800 by Mariama Fleming RN  VTE Prevention/Management: anticoagulant therapy maintained  Intervention: Prevent Infection  Recent Flowsheet Documentation  Taken 1/14/2022 1200 by Mariama Fleming RN  Infection Prevention:    environmental surveillance performed    equipment surfaces disinfected    hand hygiene promoted    personal protective equipment utilized    rest/sleep promoted    single patient room provided    visitors restricted/screened  Taken 1/14/2022 0800 by Mariama Fleming  RN  Infection Prevention:    environmental surveillance performed    equipment surfaces disinfected    hand hygiene promoted    personal protective equipment utilized    rest/sleep promoted    single patient room provided    visitors restricted/screened  Goal: Optimal Comfort and Wellbeing  Outcome: No Change  Goal: Readiness for Transition of Care  Outcome: No Change     Problem: Gas Exchange Impaired  Goal: Optimal Gas Exchange  Outcome: No Change  Intervention: Optimize Oxygenation and Ventilation  Recent Flowsheet Documentation  Taken 1/14/2022 1200 by Mariama Fleming RN  Head of Bed (HOB) Positioning: HOB at 30 degrees  Taken 1/14/2022 1017 by Mariama Fleming RN  Head of Bed (HOB) Positioning: HOB at 30 degrees  Taken 1/14/2022 0800 by Mariama Fleming RN  Head of Bed (HOB) Positioning: HOB at 30 degrees  Taken 1/14/2022 0757 by Mariama Fleming RN  Head of Bed (HOB) Positioning: HOB at 30 degrees     Problem: Electrolyte Imbalance  Goal: Electrolyte Balance  Outcome: No Change     Problem: Risk for Delirium  Goal: Optimal Coping  Outcome: No Change  Goal: Improved Behavioral Control  Outcome: No Change  Goal: Improved Attention and Thought Clarity  Outcome: No Change  Goal: Improved Sleep  Outcome: No Change     Patient was using CPAP throughout the night and was switched over to HHF Mask this morning 90% FiO2/60L. Patient was boosted in bed during initial assessment. At that time, her O2 sats dropped and she was maintaining her O2 in the low to mid 80s. She was increased to 100% FiO2/60L with no improvement. She was then switched from HHF Mask back to CPAP.    At 1200, the patient was maintaining her O2 in the high 90s. She was then switched back to the HHF Mask and is tolerating it as this time; O2 sats remain in the mid to high 90s.     Patient was switched from a full liquid diet to a soft food diet at the start of the shift per order. When taking sips of water, patient coughs. Has not had any food  intake this shift. Scoring 0 on the CIWA scale throughout the shift, no medication given. Blood sugar checks q4hrs, sliding scale coverage, 2 units given this morning.     Patient was put on HHFNC and lunch ordered. O2 sats between 88-90%.

## 2022-01-14 NOTE — PROVIDER NOTIFICATION
01/14/22 0744   Oxygen Therapy   SpO2 95 %   O2 Device High Flow Nasal Cannula (HFNC)   FiO2 (%) 90 %   Oxygen Delivery 60 LPM

## 2022-01-14 NOTE — PROGRESS NOTES
Pt was placed on CPAP 8 and 70% at 2028 ater she desatted to 86% on HFNC of 60L and 90%. Remained on CPAP overnight. Increased Fi02 from 70 to 80% at 0220 due to sats sitting at 90%. Sats have remained low to mid 90's on CPAP. BS diminished throughout with insp/exp wheezes noted earlier in the evening. Respiratory rate 22-30. Will assess for a break to HFNC during the day.

## 2022-01-14 NOTE — PROGRESS NOTES
PULMONARY / CRITICAL CARE PROGRESS NOTE    Date / Time of Admission:  1/8/2022  2:59 PM    Assessment:     Geovanna Huff is a 66 year old female vaccinated against COVID19 with history of diabetes, chronic tobacco and alcohol abuse.  Presented on 1/8/22 with shortness of breath. Diagnosed with COVID19 viral infection.   Initially placed on nasal cannula at 5 L but quickly escalated to high flow nasal cannula and transferred to the ICU.     1. Acute respiratory failure   Secondary to ARDS secondary to COVID19 viral infection.   Reactive airway symptoms and signs, possibility of associated COPD exacerbation.   Titrate FiO2, start systemic steroids, Abx. Follow up CXR.   2. COVID19 viral infection   3. ? COPD exacerbation   Significant tobacco use, wheezes and ronchi both HT on exam  Increase dose of systemic steroids   4. Acute bronchitis / pneumonia  Increase productive cough, sputum for culture, start ceftriaxone.    Follow up CXR  5. Elevated D-dimer  Admission CTA was negative for pulmonary embolism. Continue DVT prophylaxis   6. DM  7. Tobacco user    Advance Directives: Full code    Plan:   1. Titrate FiO2, currently on HFNC 60 LPM FiO2 90%, keep SpO2 > 87%  2. Non invasive positive pressure ventilation as needed  3. Scheduled bronchodilators , albuterol nebs   4. Heplock IV fluids  5. Titrate BP meds  6. Get sputum Cx, MRSA nasal screen  7. Start ceftriaxone IV  8. Change Dexamethasone to IV solumedrol   9. Supervise feeding  10. PPI for GI prophylaxis   11. Glucose level monitoring   12. DVT prophylaxis lovenox subcutaneous  13. Smoking cessation counseling     Please contact me if you have any questions.  Total critical care time, not including separately billable procedure time: 45 minutes  This patient had a high probability of imminent or life threatening deterioration due to acute respiratory failure which required my direct attention, intervention and personal management.     Tano Moss  [place]  Pulmonary / Critical Care  01/14/2022  2:53 PM          ICU DAILY CHECKLIST                           Can patient transfer out of MICU? no    FAST HUG:    Feeding:  Feeding: yes.  Patient is receiving ORAL    Carvalho: no  Analgesia/Sedation:no    Thromboembolic prophylaxis: Yes; Mode:  Lovenox and SCDs  HOB>30:  Yes  Stress Ulcer Protocol Active: Yes; Mode: PPI  Glycemic Control: Any glucose > 180 no; Mode of Insulin Therapy: Sliding Scale Insulin and Long Acting Insulin    INTUBATED:  Can patient have daily waking:  No  Can patient have spontaneous breathing trial:  No    Restraints? no    PHYSICAL THERAPY AND MOBILITY:  Can patient have PT and mobility trial: no  Activity: bedrest    Subjective:   HPI:  Geovanna Huff is a 66 year old female vaccinated against COVID19 with history of diabetes, chronic tobacco and alcohol abuse.  Presented on 1/8/22 with shortness of breath. Diagnosed with COVID19 viral infection.   Initially placed on nasal cannula at 5 L but quickly escalated to high flow nasal cannula and transferred to the ICU.     Events overnight   - Increase O2 requirements  - Afebrile  - Increase productive cough     Allergies: Patient has no known allergies.     MEDS:  Current Facility-Administered Medications   Medication     acetaminophen (TYLENOL) tablet 650 mg    Or     acetaminophen (TYLENOL) Suppository 650 mg     albuterol (PROVENTIL) neb solution 2.5 mg     amLODIPine (NORVASC) tablet 10 mg     aspirin EC tablet 81 mg     atenolol (TENORMIN) tablet 50 mg     cefTRIAXone (ROCEPHIN) 1 g vial to attach to  mL bag for ADULTS or NS 50 mL bag for PEDS     cloNIDine (CATAPRES) tablet 0.2 mg     glucose gel 15-30 g    Or     dextrose 50 % injection 25-50 mL    Or     glucagon injection 1 mg     enoxaparin ANTICOAGULANT (LOVENOX) injection 40 mg     flumazenil (ROMAZICON) injection 0.2 mg     folic acid (FOLVITE) tablet 1 mg     OLANZapine zydis (zyPREXA) ODT tab 5-10 mg    Or     haloperidol  "lactate (HALDOL) injection 2.5-5 mg     hydrALAZINE (APRESOLINE) injection 10-20 mg     insulin aspart (NovoLOG) injection (RAPID ACTING)     insulin glargine (LANTUS PEN) injection 15 Units     ipratropium-albuterol (COMBIVENT RESPIMAT) inhaler 2 puff     lidocaine (LMX4) cream     lidocaine 1 % 0.1-1 mL     LORazepam (ATIVAN) tablet 1-2 mg    Or     LORazepam (ATIVAN) injection 1-2 mg     Medication instructions: Do NOT use nebulized medications     melatonin tablet 5 mg     methylPREDNISolone sodium succinate (solu-MEDROL) injection 62.5 mg     multivitamin w/minerals (THERA-VIT-M) tablet 1 tablet     multivitamin, therapeutic (THERA-VIT) tablet 1 tablet     nicotine (NICODERM CQ) 21 MG/24HR 24 hr patch 1 patch     nicotine Patch in Place     OLANZapine (zyPREXA) tablet 15 mg     ondansetron (ZOFRAN-ODT) ODT tab 4 mg    Or     ondansetron (ZOFRAN) injection 4 mg     pantoprazole (PROTONIX) IV push injection 40 mg     prochlorperazine (COMPAZINE) injection 5 mg    Or     prochlorperazine (COMPAZINE) tablet 5 mg    Or     prochlorperazine (COMPAZINE) suppository 12.5 mg     sertraline (ZOLOFT) tablet 150 mg     sodium chloride (PF) 0.9% PF flush 10-20 mL     sodium chloride (PF) 0.9% PF flush 10-40 mL     sodium chloride (PF) 0.9% PF flush 10-40 mL     sodium chloride (PF) 0.9% PF flush 3 mL     sodium chloride (PF) 0.9% PF flush 3 mL       Objective:   VITALS:  /60   Pulse 57   Temp 98.1  F (36.7  C)   Resp 27   Ht 1.575 m (5' 2\")   Wt 69.4 kg (153 lb)   SpO2 93%   BMI 27.98 kg/m    VENT:  FiO2 (%): 90 %  Resp: 27    EXAM:   Gen: awake, alert, moderate distress  HEENT: pink conjunctiva, moist mucosa, Mallampati III/IV  Neck: no thyromegaly, masses or JVD  Lungs: ronchi and wheezes both hemithorax  CV: regular, no murmurs or gallops appreciated  Abdomen: soft, NT, BS wnl  Ext: no edema  Neuro: CN II-XII intact, non focal       Data Review:  Recent Labs   Lab 01/14/22  1310 01/14/22  1055 01/14/22  0822 " 01/14/22  0405 01/13/22  2349 01/13/22  2046   * 88 77 73 87 101*        1/14/2022 04:06   Potassium 3.9   Magnesium 1.9   WBC 5.5   Hemoglobin 8.2 (L)   Hematocrit 25.0 (L)   Platelet Count 230   RBC Count 2.21 (L)    (H)   MCH 37.1 (H)   MCHC 32.8   RDW 14.6     XR CHEST PORT 1 VIEW  DATE/TIME: 1/10/2022 10:37 AM  INDICATION: worsening hypoxia  COMPARISON: Chest x-ray 05/21/2019 and CT chest 01/08/2022                IMPRESSION: Mild to moderate patchy bilateral multilobar airspace disease, most pronounced within the right upper lobe and medial lung bases and mildly progressed since the CT from 01/08/2022. No definite pleural effusion. Stable heart size.    CT CHEST PULMONARY EMBOLISM W CONTRAST  LOCATION: [facility]  DATE/TIME: 1/8/2022 5:43 PM  INDICATION: Difficulty breathing. Weakness. Patient reports recent positive Covid 19 test.  COMPARISON: None.  FINDINGS:  ANGIOGRAM CHEST: No pulmonary embolus. No aortic dissection or aneurysm.  LUNGS AND PLEURA: Moderate patchy groundglass infiltrate throughout both lungs worst in the right upper lobe. Mild bibasilar atelectasis. No pneumothorax.  MEDIASTINUM/AXILLAE: Small hiatal hernia.  CORONARY ARTERY CALCIFICATION: Moderate.  UPPER ABDOMEN: Normal.  MUSCULOSKELETAL: Degenerative changes of the spine.                    IMPRESSION:  1.  Moderate bilateral pulmonary infiltrates consistent with COVID 19 pneumonitis.  2.  No pulmonary embolus.  3.  Coronary artery disease.  By:  Tano Conrad MD, 01/14/2022  2:53 PM    Primary Care Physician:  Rico Bui

## 2022-01-14 NOTE — PROGRESS NOTES
CLINICAL NUTRITION SERVICES - ASSESSMENT NOTE     Nutrition Prescription    RECOMMENDATIONS FOR MDs/PROVIDERS TO ORDER:      Malnutrition Status:    Severe    Recommendations already ordered by Registered Dietitian (RD):  Start Ensure Enlive daily  Start MVI daily d/t malnutrition, intake less than 75% of needs.    Future/Additional Recommendations:  Intake/supplements     REASON FOR ASSESSMENT  Geovanna Huff is a/an 66 year old female assessed by the dietitian for LOS    Pt admitted with COVID-19, respiratory failure and PNA.  Hx ETOH abuse, schizoaffective disorder, HTN, GERD    NUTRITION HISTORY  Pt transferred to ICU on 1/10 d/t increased O2 needs. Pt with limited po intake since admission.  Unable to reach pt today by phone with multi-attempts.  RN reports pt taking sips of water and coughing with this.      CURRENT NUTRITION ORDERS  Diet: Regular, easy to chew.  Intake/Tolerance: a few meals documented in chart, up to 25% eaten.    LABS  ROUTINE ICU LABS (Last four results)  CMPRecent Labs   Lab 01/14/22  0822 01/14/22  0406 01/14/22  0405 01/13/22  2349 01/13/22  2046 01/13/22  1135 01/13/22  0822 01/13/22  0811 01/13/22  0452 01/12/22  0753 01/12/22  0457 01/11/22  0928 01/11/22  0507 01/10/22  0817 01/10/22  0702 01/08/22  2212 01/08/22  1612   NA  --   --   --   --   --   --   --   --  142  --  142  --  143  --  139   < > 139   POTASSIUM  --  3.9  --   --   --   --  4.3  --  3.3*  --  3.5  --  3.6  --  3.8   < > 3.5   CHLORIDE  --   --   --   --   --   --   --   --  110*  --  110*  --  110*  --  110*   < > 101   CO2  --   --   --   --   --   --   --   --  23  --  21*  --  20*  --  20*   < > 20*   ANIONGAP  --   --   --   --   --   --   --   --  9  --  11  --  13  --  9   < > 18   GLC 77  --  73 87 101*   < >  --    < > 85   < > 80   < > 84   < > 79   < > 132*   BUN  --   --   --   --   --   --   --   --  22  --  27*  --  23*  --  31*   < > 43*   CR  --   --   --   --   --   --   --   --  0.88  --  0.85   --  0.82  --  0.86   < > 1.72*   GFRESTIMATED  --   --   --   --   --   --   --   --  72  --  75  --  78  --  74   < > 32*   OSWALDO  --   --   --   --   --   --   --   --  7.5*  --  7.9*  --  8.0*  --  8.3*   < > 9.9   MAG  --  1.9  --   --   --   --   --   --  2.7*  --  1.0*  --   --   --   --   --  1.4*   PROTTOTAL  --   --   --   --   --   --   --   --   --   --   --   --   --   --  5.8*  --  7.6   ALBUMIN  --   --   --   --   --   --   --   --   --   --   --   --   --   --  2.1*  --  2.7*   BILITOTAL  --   --   --   --   --   --   --   --   --   --   --   --   --   --  0.3  --  0.4   ALKPHOS  --   --   --   --   --   --   --   --   --   --   --   --   --   --  66  --  76   AST  --   --   --   --   --   --   --   --   --   --   --   --   --   --  43*  --  69*   ALT  --   --   --   --   --   --   --   --   --   --   --   --   --   --  27  --  30    < > = values in this interval not displayed.     CBC  Recent Labs   Lab 01/14/22  0406 01/13/22  0452 01/12/22  0457 01/11/22  0507   WBC 5.5 4.4 4.6 4.6   RBC 2.21* 2.16* 2.45* 2.56*   HGB 8.2* 8.2* 9.2* 9.7*   HCT 25.0* 24.6* 27.6* 29.2*   * 114* 113* 114*   MCH 37.1* 38.0* 37.6* 37.9*   MCHC 32.8 33.3 33.3 33.2   RDW 14.6 14.6 14.5 14.0    225 255 248     INR  Recent Labs   Lab 01/08/22  1612   INR 1.09     Arterial Blood GasNo lab results found in last 7 days.Labs reviewed    MEDICATIONS    amLODIPine  10 mg Oral Daily     aspirin  81 mg Oral Daily     atenolol  50 mg Oral Daily     cloNIDine  0.2 mg Oral BID     dexamethasone  6 mg Intravenous Daily     enoxaparin ANTICOAGULANT  40 mg Subcutaneous Q12H     folic acid  1 mg Oral Daily     insulin aspart  1-6 Units Subcutaneous Q4H     insulin glargine  15 Units Subcutaneous At Bedtime     ipratropium-albuterol  2 puff Inhalation 4x Daily     multivitamin w/minerals  1 tablet Oral Daily     nicotine  1 patch Transdermal Q24H     nicotine   Transdermal Q8H     OLANZapine  15 mg Oral At Bedtime      "pantoprazole (PROTONIX) IV  40 mg Intravenous Daily with breakfast     sertraline  150 mg Oral Daily     sodium chloride (PF)  10-40 mL Intracatheter Q7 Days     sodium chloride (PF)  3 mL Intracatheter Q8H        - MEDICATION INSTRUCTIONS -        acetaminophen **OR** acetaminophen, glucose **OR** dextrose **OR** glucagon, flumazenil, OLANZapine zydis **OR** haloperidol lactate, hydrALAZINE, lidocaine 4%, lidocaine (buffered or not buffered), LORazepam **OR** LORazepam, - MEDICATION INSTRUCTIONS -, melatonin, ondansetron **OR** ondansetron, prochlorperazine **OR** prochlorperazine **OR** prochlorperazine, sodium chloride (PF), sodium chloride (PF), sodium chloride (PF) Medications reviewed    ANTHROPOMETRICS  Height: 157.5 cm (5' 2\")  Admit wt 167 lb 1/8/22 (?stated).  157 lb 6.5 oz 1/11/22  Most Recent Weight: 69.4 kg (153 lb)    IBW: 50 kg  BMI: Overweight BMI 25-29.9  Weight History:   Wt Readings from Last 6 Encounters:   01/14/22 69.4 kg (153 lb)   05/22/19 75.8 kg (167 lb 3.2 oz)   152 lb 10/4/21    Dosing Weight: 50 kg    ASSESSED NUTRITION NEEDS  Estimated Energy Needs: 4506-2510 kcals/day (25 - 30 kcals/kg)  Justification: critically ill and Overweight  Estimated Protein Needs: 60-75 grams protein/day (1.2 - 1.5 grams of pro/kg)  Justification: Increased needs  Estimated Fluid Needs: 1500+ mL/day (1 mL/kcal)   Justification: Maintenance    PHYSICAL FINDINGS  See malnutrition section below.  Pt in COVID isolation precautions.       MALNUTRITION:  % Weight Loss:  > 2% in 1 week (severe malnutrition)  % Intake:  </= 50% for >/= 5 days (severe malnutrition)  Subcutaneous Fat Loss:  unable  Muscle Loss:  unable  Fluid Retention:  None noted per chart    Malnutrition Diagnosis: Severe malnutrition  In Context of:  Acute illness or injury    NUTRITION DIAGNOSIS  Inadequate oral intake  Malnutrition related to acute illness as evidenced by eating 0-50% at meals since admission.  "     INTERVENTIONS  Implementation  Nutrition Education: Unable to complete due to unable to reach pt.   Medical food supplement therapy     Goals  Patient to consume % of nutritionally adequate meals three times per day, or the equivalent with supplements/snacks.  Maintain wt     Monitoring/Evaluation  Progress toward goals will be monitored and evaluated per protocol.

## 2022-01-14 NOTE — PROGRESS NOTES
Care Management Follow Up    Length of Stay (days): 6    Expected Discharge Date: 01/19/2022     Concerns to be Addressed: HF oxygen needs, COVID pneumonia       Patient plan of care discussed at interdisciplinary rounds: Yes    Anticipated Discharge Disposition:  TBD: anticipate TCU at discharge pending clinical progression     Anticipated Discharge Services: SW Consult for CD evaluation pending   Anticipated Discharge DME: N/A      Patient/family educated on Medicare website which has current facility and service quality ratings: not at this time  Education Provided on the Discharge Plan: per team  Patient/Family in Agreement with the Plan: yes     Referrals Placed by CM/SW: not at this time  Private pay costs discussed: Not applicable    Additional Information:  Care Management to follow for PT recommendations, anticipate[ate need for TCU.      Jessica Rzea RN            Jessica Reza, RN

## 2022-01-14 NOTE — PROGRESS NOTES
ICU NOTE    Significant desaturation while on HFNC. Currently on continuous BiPAP.   Intubation was discussed with patient.     PLAN     Endotracheal intubation     Tano Conrad MD  Pulmonary Critical Care  01/14/22  5:00 PM

## 2022-01-14 NOTE — PLAN OF CARE
Problem: Gas Exchange Impaired  Goal: Optimal Gas Exchange  Outcome: No Change  Intervention: Optimize Oxygenation and Ventilation  Recent Flowsheet Documentation  Taken 1/14/2022 0400 by Mallory Santana RN  Head of Bed (HOB) Positioning: HOB at 30 degrees  Taken 1/14/2022 0200 by Mallory Santana RN  Head of Bed (HOB) Positioning: HOB at 30 degrees  Taken 1/14/2022 0000 by Mallory Santana RN  Head of Bed (HOB) Positioning: HOB at 30-45 degrees  Taken 1/13/2022 2200 by Mallory Santana RN  Head of Bed (HOB) Positioning: HOB at 30 degrees  Taken 1/13/2022 2000 by Mallory Santana RN  Head of Bed (HOB) Positioning: HOB at 30-45 degrees     Problem: Electrolyte Imbalance  Goal: Electrolyte Balance  Outcome: No Change     Problem: Risk for Delirium  Goal: Improved Attention and Thought Clarity  Outcome: No Change  Intervention: Maximize Cognitive Function  Recent Flowsheet Documentation  Taken 1/14/2022 0400 by Mallory Santana RN  Sensory Stimulation Regulation:   care clustered   lighting decreased   television on  Reorientation Measures:   clock in view   reorientation provided  Taken 1/14/2022 0000 by Mallory aSntana RN  Sensory Stimulation Regulation:   care clustered   lighting decreased   television on  Reorientation Measures:   clock in view   reorientation provided  Taken 1/13/2022 2000 by Mallory Santana RN  Sensory Stimulation Regulation:   care clustered   lighting decreased   television on  Reorientation Measures:   clock in view   reorientation provided     Patient fluctuates between cooperative and uncooperative. Refused to self prone or side lay. Did agree to wear CPAP at night. Purewick in place. Urine output dark, only sips of water for PO intake.

## 2022-01-14 NOTE — PROGRESS NOTES
Pt intubated and sedated at 17;05 with 7 ETT secured at 23 cm at the teeth. BBS bilateral and coarse post intubation. SpO2 95%. Will assess and follow.1/14/2022  .Yinka Massey RT

## 2022-01-15 LAB
ANION GAP SERPL CALCULATED.3IONS-SCNC: 9 MMOL/L (ref 5–18)
BASOPHILS # BLD MANUAL: 0 10E3/UL (ref 0–0.2)
BASOPHILS NFR BLD MANUAL: 0 %
BUN SERPL-MCNC: 20 MG/DL (ref 8–22)
C REACTIVE PROTEIN LHE: 0.5 MG/DL (ref 0–0.8)
C REACTIVE PROTEIN LHE: 1 MG/DL (ref 0–0.8)
CALCIUM SERPL-MCNC: 7.9 MG/DL (ref 8.5–10.5)
CHLORIDE BLD-SCNC: 110 MMOL/L (ref 98–107)
CO2 SERPL-SCNC: 20 MMOL/L (ref 22–31)
CREAT SERPL-MCNC: 0.88 MG/DL (ref 0.6–1.1)
DACRYOCYTES BLD QL SMEAR: SLIGHT
ELLIPTOCYTES BLD QL SMEAR: SLIGHT
EOSINOPHIL # BLD MANUAL: 0 10E3/UL (ref 0–0.7)
EOSINOPHIL NFR BLD MANUAL: 0 %
ERYTHROCYTE [DISTWIDTH] IN BLOOD BY AUTOMATED COUNT: 14.7 % (ref 10–15)
FRAGMENTS BLD QL SMEAR: SLIGHT
GFR SERPL CREATININE-BSD FRML MDRD: 72 ML/MIN/1.73M2
GLUCOSE BLD-MCNC: 141 MG/DL (ref 70–125)
GLUCOSE BLDC GLUCOMTR-MCNC: 142 MG/DL (ref 70–99)
GLUCOSE BLDC GLUCOMTR-MCNC: 165 MG/DL (ref 70–99)
GLUCOSE BLDC GLUCOMTR-MCNC: 180 MG/DL (ref 70–99)
GLUCOSE BLDC GLUCOMTR-MCNC: 183 MG/DL (ref 70–99)
GLUCOSE BLDC GLUCOMTR-MCNC: 200 MG/DL (ref 70–99)
GLUCOSE BLDC GLUCOMTR-MCNC: 234 MG/DL (ref 70–99)
HCT VFR BLD AUTO: 25.8 % (ref 35–47)
HGB BLD-MCNC: 8.4 G/DL (ref 11.7–15.7)
LYMPHOCYTES # BLD MANUAL: 0.5 10E3/UL (ref 0.8–5.3)
LYMPHOCYTES NFR BLD MANUAL: 7 %
MAGNESIUM SERPL-MCNC: 1.7 MG/DL (ref 1.8–2.6)
MCH RBC QN AUTO: 37 PG (ref 26.5–33)
MCHC RBC AUTO-ENTMCNC: 32.6 G/DL (ref 31.5–36.5)
MCV RBC AUTO: 114 FL (ref 78–100)
MONOCYTES # BLD MANUAL: 0 10E3/UL (ref 0–1.3)
MONOCYTES NFR BLD MANUAL: 0 %
MRSA DNA SPEC QL NAA+PROBE: NEGATIVE
NEUTROPHILS # BLD MANUAL: 7.1 10E3/UL (ref 1.6–8.3)
NEUTROPHILS NFR BLD MANUAL: 93 %
PLAT MORPH BLD: ABNORMAL
PLATELET # BLD AUTO: 246 10E3/UL (ref 150–450)
POTASSIUM BLD-SCNC: 4.6 MMOL/L (ref 3.5–5)
PROCALCITONIN SERPL-MCNC: 0.05 NG/ML (ref 0–0.49)
RBC # BLD AUTO: 2.27 10E6/UL (ref 3.8–5.2)
RBC MORPH BLD: ABNORMAL
SA TARGET DNA: NEGATIVE
SODIUM SERPL-SCNC: 139 MMOL/L (ref 136–145)
WBC # BLD AUTO: 7.6 10E3/UL (ref 4–11)

## 2022-01-15 PROCEDURE — 999N000157 HC STATISTIC RCP TIME EA 10 MIN

## 2022-01-15 PROCEDURE — 87077 CULTURE AEROBIC IDENTIFY: CPT | Performed by: INTERNAL MEDICINE

## 2022-01-15 PROCEDURE — 94640 AIRWAY INHALATION TREATMENT: CPT

## 2022-01-15 PROCEDURE — 3E043XZ INTRODUCTION OF VASOPRESSOR INTO CENTRAL VEIN, PERCUTANEOUS APPROACH: ICD-10-PCS | Performed by: INTERNAL MEDICINE

## 2022-01-15 PROCEDURE — 84145 PROCALCITONIN (PCT): CPT | Performed by: INTERNAL MEDICINE

## 2022-01-15 PROCEDURE — 250N000011 HC RX IP 250 OP 636: Performed by: INTERNAL MEDICINE

## 2022-01-15 PROCEDURE — 87205 SMEAR GRAM STAIN: CPT | Performed by: INTERNAL MEDICINE

## 2022-01-15 PROCEDURE — 258N000003 HC RX IP 258 OP 636: Performed by: INTERNAL MEDICINE

## 2022-01-15 PROCEDURE — 250N000013 HC RX MED GY IP 250 OP 250 PS 637: Performed by: INTERNAL MEDICINE

## 2022-01-15 PROCEDURE — 86140 C-REACTIVE PROTEIN: CPT | Performed by: INTERNAL MEDICINE

## 2022-01-15 PROCEDURE — 83735 ASSAY OF MAGNESIUM: CPT | Performed by: INTERNAL MEDICINE

## 2022-01-15 PROCEDURE — 99291 CRITICAL CARE FIRST HOUR: CPT | Performed by: INTERNAL MEDICINE

## 2022-01-15 PROCEDURE — 94003 VENT MGMT INPAT SUBQ DAY: CPT

## 2022-01-15 PROCEDURE — 94640 AIRWAY INHALATION TREATMENT: CPT | Mod: 76

## 2022-01-15 PROCEDURE — 250N000013 HC RX MED GY IP 250 OP 250 PS 637: Performed by: FAMILY MEDICINE

## 2022-01-15 PROCEDURE — 80048 BASIC METABOLIC PNL TOTAL CA: CPT | Performed by: INTERNAL MEDICINE

## 2022-01-15 PROCEDURE — 250N000009 HC RX 250: Performed by: INTERNAL MEDICINE

## 2022-01-15 PROCEDURE — 999N000178 HC STATISTIC SUCTION SPUTUM

## 2022-01-15 PROCEDURE — 200N000001 HC R&B ICU

## 2022-01-15 PROCEDURE — C9113 INJ PANTOPRAZOLE SODIUM, VIA: HCPCS | Performed by: INTERNAL MEDICINE

## 2022-01-15 PROCEDURE — 85027 COMPLETE CBC AUTOMATED: CPT | Performed by: INTERNAL MEDICINE

## 2022-01-15 RX ORDER — MAGNESIUM SULFATE HEPTAHYDRATE 40 MG/ML
2 INJECTION, SOLUTION INTRAVENOUS ONCE
Status: COMPLETED | OUTPATIENT
Start: 2022-01-15 | End: 2022-01-15

## 2022-01-15 RX ADMIN — VANCOMYCIN HYDROCHLORIDE 750 MG: 1 INJECTION, POWDER, LYOPHILIZED, FOR SOLUTION INTRAVENOUS at 08:20

## 2022-01-15 RX ADMIN — ALBUTEROL SULFATE 2.5 MG: 2.5 SOLUTION RESPIRATORY (INHALATION) at 01:05

## 2022-01-15 RX ADMIN — Medication 12 MG/HR: at 21:18

## 2022-01-15 RX ADMIN — FOLIC ACID 1 MG: 1 TABLET ORAL at 08:21

## 2022-01-15 RX ADMIN — ALBUTEROL SULFATE 2.5 MG: 2.5 SOLUTION RESPIRATORY (INHALATION) at 19:25

## 2022-01-15 RX ADMIN — METHYLPREDNISOLONE SODIUM SUCCINATE 62.5 MG: 125 INJECTION, POWDER, FOR SOLUTION INTRAMUSCULAR; INTRAVENOUS at 06:15

## 2022-01-15 RX ADMIN — ENOXAPARIN SODIUM 40 MG: 40 INJECTION SUBCUTANEOUS at 08:21

## 2022-01-15 RX ADMIN — ALBUTEROL SULFATE 2.5 MG: 2.5 SOLUTION RESPIRATORY (INHALATION) at 13:22

## 2022-01-15 RX ADMIN — CEFEPIME HYDROCHLORIDE 2 G: 2 INJECTION, POWDER, FOR SOLUTION INTRAVENOUS at 18:04

## 2022-01-15 RX ADMIN — INSULIN GLARGINE 15 UNITS: 100 INJECTION, SOLUTION SUBCUTANEOUS at 20:22

## 2022-01-15 RX ADMIN — PANTOPRAZOLE SODIUM 40 MG: 40 INJECTION, POWDER, FOR SOLUTION INTRAVENOUS at 08:22

## 2022-01-15 RX ADMIN — Medication 12 MG/HR: at 13:31

## 2022-01-15 RX ADMIN — MULTIPLE VITAMINS W/ MINERALS TAB 1 TABLET: TAB at 08:21

## 2022-01-15 RX ADMIN — ALBUTEROL SULFATE 2.5 MG: 2.5 SOLUTION RESPIRATORY (INHALATION) at 07:40

## 2022-01-15 RX ADMIN — METHYLPREDNISOLONE SODIUM SUCCINATE 62.5 MG: 125 INJECTION, POWDER, FOR SOLUTION INTRAMUSCULAR; INTRAVENOUS at 18:06

## 2022-01-15 RX ADMIN — ASPIRIN 81 MG CHEWABLE TABLET 81 MG: 81 TABLET CHEWABLE at 08:21

## 2022-01-15 RX ADMIN — NICOTINE 1 PATCH: 21 PATCH, EXTENDED RELEASE TRANSDERMAL at 20:25

## 2022-01-15 RX ADMIN — Medication 12 MG/HR: at 04:34

## 2022-01-15 RX ADMIN — ENOXAPARIN SODIUM 40 MG: 40 INJECTION SUBCUTANEOUS at 20:22

## 2022-01-15 RX ADMIN — FENTANYL CITRATE 100 MCG/HR: 50 INJECTION, SOLUTION INTRAMUSCULAR; INTRAVENOUS at 11:22

## 2022-01-15 RX ADMIN — OLANZAPINE 15 MG: 10 TABLET, FILM COATED ORAL at 20:27

## 2022-01-15 RX ADMIN — CHLORHEXIDINE GLUCONATE 0.12% ORAL RINSE 15 ML: 1.2 LIQUID ORAL at 20:22

## 2022-01-15 RX ADMIN — SERTRALINE HYDROCHLORIDE 150 MG: 50 TABLET ORAL at 08:21

## 2022-01-15 RX ADMIN — CHLORHEXIDINE GLUCONATE 0.12% ORAL RINSE 15 ML: 1.2 LIQUID ORAL at 08:21

## 2022-01-15 RX ADMIN — CEFEPIME HYDROCHLORIDE 2 G: 2 INJECTION, POWDER, FOR SOLUTION INTRAVENOUS at 06:15

## 2022-01-15 RX ADMIN — MAGNESIUM SULFATE HEPTAHYDRATE 2 G: 40 INJECTION, SOLUTION INTRAVENOUS at 05:34

## 2022-01-15 ASSESSMENT — ACTIVITIES OF DAILY LIVING (ADL)
ADLS_ACUITY_SCORE: 14
ADLS_ACUITY_SCORE: 13
ADLS_ACUITY_SCORE: 11
ADLS_ACUITY_SCORE: 13
ADLS_ACUITY_SCORE: 14
ADLS_ACUITY_SCORE: 13
ADLS_ACUITY_SCORE: 13
ADLS_ACUITY_SCORE: 14
ADLS_ACUITY_SCORE: 13
ADLS_ACUITY_SCORE: 11
ADLS_ACUITY_SCORE: 13
ADLS_ACUITY_SCORE: 14
ADLS_ACUITY_SCORE: 13

## 2022-01-15 NOTE — PHARMACY-VANCOMYCIN DOSING SERVICE
Pharmacy Vancomycin Initial Note  Date of Service 2022  Patient's  1955  66 year old, female    Indication: Healthcare-Associated Pneumonia and Sepsis    Current estimated CrCl = Estimated Creatinine Clearance: 57.4 mL/min (based on SCr of 0.88 mg/dL).    Creatinine for last 3 days  2022:  4:57 AM Creatinine 0.85 mg/dL  2022:  4:52 AM Creatinine 0.88 mg/dL    Recent Vancomycin Level(s) for last 3 days  No results found for requested labs within last 72 hours.      Vancomycin IV Administrations (past 72 hours)      No vancomycin orders with administrations in past 72 hours.                Nephrotoxins and other renal medications (From now, onward)    Start     Dose/Rate Route Frequency Ordered Stop    01/15/22 0830  vancomycin (VANCOCIN) 750 mg in sodium chloride 0.9 % 250 mL intermittent infusion         750 mg  over 60-90 Minutes Intravenous EVERY 12 HOURS 22 1700  norepinephrine (LEVOPHED) 4 mg in  mL infusion PREMIX         0.01-0.6 mcg/kg/min × 69.4 kg  2.6-156.2 mL/hr  Intravenous CONTINUOUS 22 1651            Contrast Orders - past 72 hours (72h ago, onward)            None          [employer] Prediction of Planned Initial Vancomycin Regimen  Loading dose: 1500 mg at 20:15 2022.  Regimen: 750 mg IV every 12 hours.  Start time: 20:05 on 2022  Exposure target: AUC24 (range)400-600 mg/L.hr   AUC24,ss: 506 mg/L.hr  Probability of AUC24 > 400: 75 %  Ctrough,ss: 16.7 mg/L  Probability of Ctrough,ss > 20: 34 %  Probability of nephrotoxicity (Lodise CHRISTIAN ): 12 %          Plan:  1. Start vancomycin  1500 mg IV ONCE, followed by 750 mg IV q12h.  2. Vancomycin monitoring method: AUC  3. Vancomycin therapeutic monitoring goal: 400-600 mg*h/L  4. Pharmacy will check vancomycin levels as appropriate in 1-3 Days.    5. Serum creatinine levels will be ordered daily for the first week of therapy and at least twice weekly for subsequent weeks.       Renuka Owens, [facility]

## 2022-01-15 NOTE — PLAN OF CARE
Problem: Communication Impairment (Mechanical Ventilation, Invasive)  Goal: Effective Communication  Outcome: No Change     Problem: Inability to Wean (Mechanical Ventilation, Invasive)  Goal: Mechanical Ventilation Liberation  Outcome: No Change     Problem: Skin and Tissue Injury (Mechanical Ventilation, Invasive)  Goal: Absence of Device-Related Skin and Tissue Injury  Outcome: No Change

## 2022-01-15 NOTE — PLAN OF CARE
Problem: Adult Inpatient Plan of Care  Goal: Plan of Care Review  Outcome: Improving   Care plan reviewed.  Problem: Risk for Delirium  Goal: Improved Sleep  Outcome: Improving   Patient resting comfortably.

## 2022-01-15 NOTE — PROGRESS NOTES
ETT # 7.0 secured at 23 cm at the teeth. Oxygen titrated. Pt remains vented/full support. Current settings: AC 20 300 40% 12. Boph737%. BS coarse. Nebs given. RT following.    Manoj Hernandez, RT

## 2022-01-15 NOTE — PROGRESS NOTES
PULMONARY / CRITICAL CARE PROGRESS NOTE    Date / Time of Admission:  1/8/2022  2:59 PM    Assessment:     Geovanna Huff is a 66 year old female vaccinated against COVID19 with history of diabetes, chronic tobacco and alcohol abuse.  Presented on 1/8/22 with shortness of breath. Diagnosed with COVID19 viral infection.   Initially placed on nasal cannula at 5 L but quickly escalated to high flow nasal cannula and transferred to the ICU. Patient failed BiPAP. Intubated on 1/14.     1. Acute respiratory failure s/p intubation 1/14  Secondary to ARDS secondary to COVID19 viral infection.   ? Associated COPD exacerbation   2. COVID19 viral infection   3. ? COPD exacerbation   Significant tobacco use, wheezes and ronchi both HT on exam  Systemic steroids   4. Pneumonia  Follow up sputum Cx. Continue broad Abx cefepime and vancomycin  5. Elevated D-dimer  Admission CTA was negative for pulmonary embolism. Continue DVT prophylaxis   6. DM  7. Tobacco user    Advance Directives: Full code    Plan:   1. Titrate vent settings A/C 20/300/12/40%  2. Sedation to keep RASS -3 to -4 , versed, propofol and fentanyl drip   3. Scheduled bronchodilators , albuterol nebs   4. Heplock IV fluids  5. Titrate BP meds  6. Follow up Tracheal Cx  7. Continue Abx cefepime and vancomycin   8. IV solumedrol   9. Tube feedings   10. PPI for GI prophylaxis   11. Glucose level monitoring   12. DVT prophylaxis lovenox subcutaneous    Please contact me if you have any questions.  Total critical care time, not including separately billable procedure time: 45 minutes  This patient had a high probability of imminent or life threatening deterioration due to acute respiratory failure which required my direct attention, intervention and personal management.     Tano Conrad  Pulmonary / Critical Care  01/15/2022 12:00 PM        ICU DAILY CHECKLIST                           Can patient transfer out of MICU? no    FAST HUG:    Feeding:   Feeding: yes.  Patient is receiving tube feedings   Carvalho: no  Analgesia/Sedation:no    Thromboembolic prophylaxis: Yes; Mode:  Lovenox and SCDs  HOB>30:  Yes  Stress Ulcer Protocol Active: Yes; Mode: PPI  Glycemic Control: Any glucose > 180 no; Mode of Insulin Therapy: Sliding Scale Insulin and Long Acting Insulin    INTUBATED:  Can patient have daily waking:  No  Can patient have spontaneous breathing trial:  No    Restraints? yes    PHYSICAL THERAPY AND MOBILITY:  Can patient have PT and mobility trial: no  Activity: bedrest    Subjective:   HPI:  Geovanna Huff is a 66 year old female vaccinated against COVID19 with history of diabetes, chronic tobacco and alcohol abuse.  Presented on 1/8/22 with shortness of breath. Diagnosed with COVID19 viral infection.   Initially placed on nasal cannula at 5 L but quickly escalated to high flow nasal cannula and transferred to the ICU. Patient failed BiPAP. Intubated on 1/14.     Events overnight   - Intubated  - Afebrile  - Hemodynamically stable  - Tolerating tube feedings    Allergies: Patient has no known allergies.     MEDS:  Current Facility-Administered Medications   Medication     acetaminophen (TYLENOL) tablet 650 mg    Or     acetaminophen (TYLENOL) Suppository 650 mg     albuterol (PROVENTIL) neb solution 2.5 mg     [Held by provider] amLODIPine (NORVASC) tablet 10 mg     aspirin (ASA) chewable tablet 81 mg     [Held by provider] atenolol (TENORMIN) tablet 50 mg     ceFEPIme (MAXIPIME) 2 g vial to attach to  ml bag for ADULTS or 50 ml bag for PEDS     chlorhexidine (PERIDEX) 0.12 % solution 15 mL     [Held by provider] cloNIDine (CATAPRES) tablet 0.2 mg     dextrose 10% infusion     glucose gel 15-30 g    Or     dextrose 50 % injection 25-50 mL    Or     glucagon injection 1 mg     glucose gel 15-30 g    Or     dextrose 50 % injection 25-50 mL    Or     glucagon injection 1 mg     enoxaparin ANTICOAGULANT (LOVENOX) injection 40 mg     fentaNYL (SUBLIMAZE)  50 mcg/mL bolus from infusion pump 25-50 mcg     fentaNYL (SUBLIMAZE) infusion     flumazenil (ROMAZICON) injection 0.2 mg     folic acid (FOLVITE) tablet 1 mg     OLANZapine zydis (zyPREXA) ODT tab 5-10 mg    Or     haloperidol lactate (HALDOL) injection 2.5-5 mg     hydrALAZINE (APRESOLINE) injection 10-20 mg     insulin aspart (NovoLOG) injection (RAPID ACTING)     insulin glargine (LANTUS PEN) injection 15 Units     ipratropium-albuterol (COMBIVENT RESPIMAT) inhaler 2 puff     lidocaine (LMX4) cream     lidocaine 1 % 0.1-1 mL     LORazepam (ATIVAN) tablet 1-2 mg    Or     LORazepam (ATIVAN) injection 1-2 mg     Medication instructions: Do NOT use nebulized medications     melatonin tablet 5 mg     methylPREDNISolone sodium succinate (solu-MEDROL) injection 62.5 mg     midazolam (VERSED) bolus from infusion pump 1-2 mg     midazolam (VERSED) drip - ADULT 100 mg/100 mL in NS (pre-mix)     multivitamin w/minerals (THERA-VIT-M) tablet 1 tablet     multivitamin, therapeutic (THERA-VIT) tablet 1 tablet     naloxone (NARCAN) injection 0.2 mg    Or     naloxone (NARCAN) injection 0.4 mg    Or     naloxone (NARCAN) injection 0.2 mg    Or     naloxone (NARCAN) injection 0.4 mg     nicotine (NICODERM CQ) 21 MG/24HR 24 hr patch 1 patch     nicotine Patch in Place     norepinephrine (LEVOPHED) 4 mg in  mL infusion PREMIX     OLANZapine (zyPREXA) tablet 15 mg     ondansetron (ZOFRAN-ODT) ODT tab 4 mg    Or     ondansetron (ZOFRAN) injection 4 mg     pantoprazole (PROTONIX) IV push injection 40 mg     prochlorperazine (COMPAZINE) injection 5 mg    Or     prochlorperazine (COMPAZINE) tablet 5 mg    Or     prochlorperazine (COMPAZINE) suppository 12.5 mg     propofol (DIPRIVAN) infusion     sertraline (ZOLOFT) tablet 150 mg     sodium chloride (PF) 0.9% PF flush 10-20 mL     sodium chloride (PF) 0.9% PF flush 10-40 mL     sodium chloride (PF) 0.9% PF flush 10-40 mL     sodium chloride (PF) 0.9% PF flush 3 mL     sodium  "chloride (PF) 0.9% PF flush 3 mL       Objective:   VITALS:  /74   Pulse 66   Temp 98.7  F (37.1  C) (Oral)   Resp 15   Ht 1.575 m (5' 2\")   Wt 69.4 kg (153 lb)   SpO2 97%   BMI 27.98 kg/m    VENT:  Ventilation Mode: CMV/AC  (Continuous Mandatory Ventilation/ Assist Control)  FiO2 (%): 40 %  Rate Set (breaths/minute): 20 breaths/min  Tidal Volume Set (mL): 300 mL  PEEP (cm H2O): 12 cmH2O  Oxygen Concentration (%): 40 %  Resp: 15    EXAM:   Gen: sedated, vented  HEENT: pink conjunctiva, moist mucosa  Neck: no thyromegaly, masses or JVD  Lungs: discrete ronchi and wheezes both hemithorax  CV: regular, no murmurs or gallops appreciated  Abdomen: soft, NT, BS wnl  Ext: no edema  Neuro: sedated not arousable      Data Review:  Recent Labs   Lab 01/15/22  1556 01/15/22  1128 01/15/22  0819 01/15/22  0339 01/15/22  0333 01/14/22  2316   * 180* 165* 141* 142* 118*      1/15/2022 03:39   Sodium 139   Potassium 4.6   Chloride 110 (H)   Carbon Dioxide 20 (L)   Urea Nitrogen 20   Creatinine 0.88   GFR Estimate 72   Calcium 7.9 (L)   Anion Gap 9   Magnesium 1.7 (L)   CRP 1.0 (H)   Procalcitonin 0.05   Glucose 141 (H)   WBC 7.6   Hemoglobin 8.4 (L)   Hematocrit 25.8 (L)   Platelet Count 246   RBC Count 2.27 (L)    (H)   MCH 37.0 (H)   MCHC 32.6   RDW 14.7   % Neutrophils 93   % Lymphocytes 7   % Monocytes 0   % Eosinophils 0   % Basophils 0       XR CHEST PORT 1 VIEW  DATE/TIME: 1/10/2022 10:37 AM  INDICATION: worsening hypoxia  COMPARISON: Chest x-ray 05/21/2019 and CT chest 01/08/2022                IMPRESSION: Mild to moderate patchy bilateral multilobar airspace disease, most pronounced within the right upper lobe and medial lung bases and mildly progressed since the CT from 01/08/2022. No definite pleural effusion. Stable heart size.    XR CHEST PORT 1 VIEW  LOCATION: [facility]  DATE/TIME: 1/14/2022 6:52 PM   INDICATION: Endotracheal tube positioning  COMPARISON: " 1/10/2022  IMPRESSION: ET tube in good position with tip 2.4 cm above soy. NG tube stents in the stomach. Left PICC line in good position with tip low SVC.  Patchy mixed interstitial and groundglass infiltrates present bilaterally consistent with pneumonia. Borderline cardiomegaly.    CT CHEST PULMONARY EMBOLISM W CONTRAST  LOCATION: [facility]  DATE/TIME: 1/8/2022 5:43 PM  INDICATION: Difficulty breathing. Weakness. Patient reports recent positive Covid 19 test.  COMPARISON: None.  FINDINGS:  ANGIOGRAM CHEST: No pulmonary embolus. No aortic dissection or aneurysm.  LUNGS AND PLEURA: Moderate patchy groundglass infiltrate throughout both lungs worst in the right upper lobe. Mild bibasilar atelectasis. No pneumothorax.  MEDIASTINUM/AXILLAE: Small hiatal hernia.  CORONARY ARTERY CALCIFICATION: Moderate.  UPPER ABDOMEN: Normal.  MUSCULOSKELETAL: Degenerative changes of the spine.                    IMPRESSION:  1.  Moderate bilateral pulmonary infiltrates consistent with COVID 19 pneumonitis.  2.  No pulmonary embolus.  3.  Coronary artery disease.    By:  Tano Conrad MD, 01/15/2022 12:00 PM  Primary Care Physician:  Rico Bui

## 2022-01-15 NOTE — PROGRESS NOTES
RT PROGRESS NOTE    VENT DAY# 2    CURRENT SETTINGS:   Ventilation Mode: CMV/AC  (Continuous Mandatory Ventilation/ Assist Control)  FiO2 (%): 40 %  Rate Set (breaths/minute): 20 breaths/min  Tidal Volume Set (mL): 300 mL  PEEP (cm H2O): 12 cmH2O  Oxygen Concentration (%): 40 %  Resp: 20      PATIENT PARAMETERS:  PIP 22  Pplat:  20  Pmean:  15  Compliance: 39  SBT: No    Breath sounds  Coarse , small white secretions  7.0 Secured at 23 cm at teeth/gums    Respiratory Medications: albuterol         NOTE / SHIFT SUMMARY:   Continue current vent settings, wean when pt is ready    Karli Joep, RT

## 2022-01-16 ENCOUNTER — APPOINTMENT (OUTPATIENT)
Dept: RADIOLOGY | Facility: HOSPITAL | Age: 67
DRG: 208 | End: 2022-01-16
Attending: INTERNAL MEDICINE
Payer: COMMERCIAL

## 2022-01-16 LAB
ANION GAP SERPL CALCULATED.3IONS-SCNC: 6 MMOL/L (ref 5–18)
BASE EXCESS BLDA CALC-SCNC: -1.8 MMOL/L
BUN SERPL-MCNC: 23 MG/DL (ref 8–22)
CALCIUM SERPL-MCNC: 7.7 MG/DL (ref 8.5–10.5)
CHLORIDE BLD-SCNC: 109 MMOL/L (ref 98–107)
CO2 SERPL-SCNC: 22 MMOL/L (ref 22–31)
COHGB MFR BLD: 99.8 % (ref 95–96)
CREAT SERPL-MCNC: 1.01 MG/DL (ref 0.6–1.1)
ERYTHROCYTE [DISTWIDTH] IN BLOOD BY AUTOMATED COUNT: 14.8 % (ref 10–15)
GFR SERPL CREATININE-BSD FRML MDRD: 61 ML/MIN/1.73M2
GLUCOSE BLD-MCNC: 229 MG/DL (ref 70–125)
GLUCOSE BLDC GLUCOMTR-MCNC: 189 MG/DL (ref 70–99)
GLUCOSE BLDC GLUCOMTR-MCNC: 198 MG/DL (ref 70–99)
GLUCOSE BLDC GLUCOMTR-MCNC: 211 MG/DL (ref 70–99)
GLUCOSE BLDC GLUCOMTR-MCNC: 213 MG/DL (ref 70–99)
GLUCOSE BLDC GLUCOMTR-MCNC: 235 MG/DL (ref 70–99)
HCO3 BLD-SCNC: 23 MMOL/L (ref 23–29)
HCT VFR BLD AUTO: 24.3 % (ref 35–47)
HGB BLD-MCNC: 7.8 G/DL (ref 11.7–15.7)
MAGNESIUM SERPL-MCNC: 2 MG/DL (ref 1.8–2.6)
MCH RBC QN AUTO: 36.8 PG (ref 26.5–33)
MCHC RBC AUTO-ENTMCNC: 32.1 G/DL (ref 31.5–36.5)
MCV RBC AUTO: 115 FL (ref 78–100)
O2/TOTAL GAS SETTING VFR VENT: 0.4 %
OXYHGB MFR BLD: 98.5 % (ref 95–96)
PCO2 BLD: 44 MM HG (ref 35–45)
PEEP: 10 CM H2O
PH BLD: 7.34 [PH] (ref 7.37–7.44)
PLATELET # BLD AUTO: 207 10E3/UL (ref 150–450)
PO2 BLD: 152 MM HG (ref 75–85)
POTASSIUM BLD-SCNC: 4.2 MMOL/L (ref 3.5–5)
RATE: 18 RR/MIN
RBC # BLD AUTO: 2.12 10E6/UL (ref 3.8–5.2)
SODIUM SERPL-SCNC: 137 MMOL/L (ref 136–145)
TEMPERATURE: 37 DEGREES C
VENTILATION MODE: AC
VENTILATOR TIDAL VOLUME: 300 ML
WBC # BLD AUTO: 6.5 10E3/UL (ref 4–11)

## 2022-01-16 PROCEDURE — C9113 INJ PANTOPRAZOLE SODIUM, VIA: HCPCS | Performed by: INTERNAL MEDICINE

## 2022-01-16 PROCEDURE — 250N000013 HC RX MED GY IP 250 OP 250 PS 637: Performed by: FAMILY MEDICINE

## 2022-01-16 PROCEDURE — 94003 VENT MGMT INPAT SUBQ DAY: CPT

## 2022-01-16 PROCEDURE — 83735 ASSAY OF MAGNESIUM: CPT | Performed by: INTERNAL MEDICINE

## 2022-01-16 PROCEDURE — 250N000013 HC RX MED GY IP 250 OP 250 PS 637: Performed by: INTERNAL MEDICINE

## 2022-01-16 PROCEDURE — 94640 AIRWAY INHALATION TREATMENT: CPT | Mod: 76

## 2022-01-16 PROCEDURE — 99291 CRITICAL CARE FIRST HOUR: CPT | Performed by: INTERNAL MEDICINE

## 2022-01-16 PROCEDURE — 80048 BASIC METABOLIC PNL TOTAL CA: CPT | Performed by: INTERNAL MEDICINE

## 2022-01-16 PROCEDURE — 250N000009 HC RX 250: Performed by: INTERNAL MEDICINE

## 2022-01-16 PROCEDURE — 200N000001 HC R&B ICU

## 2022-01-16 PROCEDURE — 94640 AIRWAY INHALATION TREATMENT: CPT

## 2022-01-16 PROCEDURE — 82805 BLOOD GASES W/O2 SATURATION: CPT | Performed by: INTERNAL MEDICINE

## 2022-01-16 PROCEDURE — 250N000011 HC RX IP 250 OP 636: Performed by: INTERNAL MEDICINE

## 2022-01-16 PROCEDURE — 999N000065 XR CHEST 1 VIEW

## 2022-01-16 PROCEDURE — 85027 COMPLETE CBC AUTOMATED: CPT | Performed by: FAMILY MEDICINE

## 2022-01-16 PROCEDURE — 999N000157 HC STATISTIC RCP TIME EA 10 MIN

## 2022-01-16 RX ORDER — METHYLPREDNISOLONE SODIUM SUCCINATE 40 MG/ML
40 INJECTION, POWDER, LYOPHILIZED, FOR SOLUTION INTRAMUSCULAR; INTRAVENOUS EVERY 12 HOURS
Status: DISCONTINUED | OUTPATIENT
Start: 2022-01-16 | End: 2022-01-18

## 2022-01-16 RX ORDER — DEXMEDETOMIDINE HYDROCHLORIDE 4 UG/ML
.1-1.2 INJECTION, SOLUTION INTRAVENOUS CONTINUOUS
Status: DISCONTINUED | OUTPATIENT
Start: 2022-01-16 | End: 2022-01-17

## 2022-01-16 RX ORDER — CEFTRIAXONE 2 G/1
2 INJECTION, POWDER, FOR SOLUTION INTRAMUSCULAR; INTRAVENOUS EVERY 24 HOURS
Status: COMPLETED | OUTPATIENT
Start: 2022-01-16 | End: 2022-01-19

## 2022-01-16 RX ADMIN — ALBUTEROL SULFATE 2.5 MG: 2.5 SOLUTION RESPIRATORY (INHALATION) at 01:36

## 2022-01-16 RX ADMIN — CHLORHEXIDINE GLUCONATE 0.12% ORAL RINSE 15 ML: 1.2 LIQUID ORAL at 20:19

## 2022-01-16 RX ADMIN — PANTOPRAZOLE SODIUM 40 MG: 40 INJECTION, POWDER, FOR SOLUTION INTRAVENOUS at 08:20

## 2022-01-16 RX ADMIN — CEFTRIAXONE SODIUM 2 G: 2 INJECTION, POWDER, FOR SOLUTION INTRAMUSCULAR; INTRAVENOUS at 18:04

## 2022-01-16 RX ADMIN — METHYLPREDNISOLONE SODIUM SUCCINATE 62.5 MG: 125 INJECTION, POWDER, FOR SOLUTION INTRAMUSCULAR; INTRAVENOUS at 07:05

## 2022-01-16 RX ADMIN — Medication 1 TABLET: at 08:20

## 2022-01-16 RX ADMIN — CHLORHEXIDINE GLUCONATE 0.12% ORAL RINSE 15 ML: 1.2 LIQUID ORAL at 08:20

## 2022-01-16 RX ADMIN — DEXMEDETOMIDINE HYDROCHLORIDE 0.4 MCG/KG/HR: 400 INJECTION INTRAVENOUS at 11:13

## 2022-01-16 RX ADMIN — DEXMEDETOMIDINE HYDROCHLORIDE 0.4 MCG/KG/HR: 400 INJECTION INTRAVENOUS at 23:48

## 2022-01-16 RX ADMIN — ALBUTEROL SULFATE 2.5 MG: 2.5 SOLUTION RESPIRATORY (INHALATION) at 07:21

## 2022-01-16 RX ADMIN — Medication 12 MG/HR: at 05:40

## 2022-01-16 RX ADMIN — METHYLPREDNISOLONE SODIUM SUCCINATE 40 MG: 40 INJECTION, POWDER, FOR SOLUTION INTRAMUSCULAR; INTRAVENOUS at 18:05

## 2022-01-16 RX ADMIN — OLANZAPINE 15 MG: 10 TABLET, FILM COATED ORAL at 20:19

## 2022-01-16 RX ADMIN — FOLIC ACID 1 MG: 1 TABLET ORAL at 08:20

## 2022-01-16 RX ADMIN — CEFEPIME HYDROCHLORIDE 2 G: 2 INJECTION, POWDER, FOR SOLUTION INTRAVENOUS at 07:05

## 2022-01-16 RX ADMIN — SERTRALINE HYDROCHLORIDE 150 MG: 50 TABLET ORAL at 08:20

## 2022-01-16 RX ADMIN — ALBUTEROL SULFATE 2.5 MG: 2.5 SOLUTION RESPIRATORY (INHALATION) at 13:34

## 2022-01-16 RX ADMIN — FENTANYL CITRATE 150 MCG/HR: 50 INJECTION, SOLUTION INTRAMUSCULAR; INTRAVENOUS at 07:07

## 2022-01-16 RX ADMIN — ENOXAPARIN SODIUM 40 MG: 40 INJECTION SUBCUTANEOUS at 20:19

## 2022-01-16 RX ADMIN — ASPIRIN 81 MG CHEWABLE TABLET 81 MG: 81 TABLET CHEWABLE at 08:20

## 2022-01-16 RX ADMIN — ALBUTEROL SULFATE 2.5 MG: 2.5 SOLUTION RESPIRATORY (INHALATION) at 19:27

## 2022-01-16 RX ADMIN — ENOXAPARIN SODIUM 40 MG: 40 INJECTION SUBCUTANEOUS at 08:20

## 2022-01-16 ASSESSMENT — ACTIVITIES OF DAILY LIVING (ADL)
ADLS_ACUITY_SCORE: 13

## 2022-01-16 NOTE — PROGRESS NOTES
RT PROGRESS NOTE    VENT DAY# 3    CURRENT SETTINGS:   Ventilation Mode: CMV/AC  (Continuous Mandatory Ventilation/ Assist Control)  FiO2 (%): 30 %  Rate Set (breaths/minute): 18 breaths/min  Tidal Volume Set (mL): 300 mL  PEEP (cm H2O): 10 cmH2O  Oxygen Concentration (%): 40 %  Resp: 15      PATIENT PARAMETERS:  PIP 29  Pplat:  25  Pmean:  14  Compliance: 25  SBT: NO, sedated     Secretions: white secretions  02 Sats:  96%    ETT SIZE 7.0 Secured at 21 cm at teeth/gums, pulled back 2 cm as ordered at 1150    Respiratory Medications: none     ABG: @0530 pH 7.34; pCO2 44; pO2 152; HCO3 23, %O2 Sat 99, BE 1.8 on above settings    NOTE / SHIFT SUMMARY:   Cont current  Vent settings, possible wean tyree KERR Mikaela, RT

## 2022-01-16 NOTE — PLAN OF CARE
Problem: Communication Impairment (Mechanical Ventilation, Invasive)  Goal: Effective Communication  Outcome: Improving     Problem: Device-Related Complication Risk (Mechanical Ventilation, Invasive)  Goal: Optimal Device Function  Outcome: Improving     Problem: Inability to Wean (Mechanical Ventilation, Invasive)  Goal: Mechanical Ventilation Liberation  Outcome: Improving

## 2022-01-16 NOTE — PROGRESS NOTES
Patient remains vent/full support. Changes are made; current settings: AC 18 300 40% 10. sats 98%. PIP 22 cmH2O, Pplat 13 cmH2O. BS diminished. Nebs given. Small/thick secretions suctioned. RT following.    Manoj Hernandez, RT

## 2022-01-16 NOTE — PROGRESS NOTES
PULMONARY / CRITICAL CARE PROGRESS NOTE    Date / Time of Admission:  1/8/2022  2:59 PM    Assessment:     Geovanna Huff is a 66 year old female vaccinated against COVID19 with history of diabetes, chronic tobacco and alcohol abuse.  Presented on 1/8/22 with shortness of breath. Diagnosed with COVID19 viral infection.   Initially placed on nasal cannula at 5 L but quickly escalated to high flow nasal cannula and transferred to the ICU. Patient failed BiPAP. Intubated on 1/14.     1. Acute respiratory failure s/p intubation 1/14  Secondary to ARDS secondary to COVID19 viral infection.   ? Associated COPD exacerbation   2. COVID19 viral infection   3. ? COPD exacerbation   Significant tobacco use, wheezes and ronchi both HT on exam  Start to taper down systemic steroids   4. Pneumonia  Follow up sputum Cx. Gram stain GPC.   Narrow Abx to ceftriaxone and vancomycin  5. Elevated D-dimer  Admission CTA was negative for pulmonary embolism. Continue DVT prophylaxis   6. DM  Increase dose of insulin lantus, SSI  7. Tobacco user    Advance Directives: Full code    Plan:   1. Titrate vent settings A/C 16/320/8/35%  2. Sedation to keep RASS -1 to -2 , precedex and fentanyl drip   3. Scheduled bronchodilators , albuterol nebs   4. Heplock IV fluids  5. Titrate BP meds  6. Follow up Tracheal Cx  7. Narrow Abx to ceftriaxone and vancomycin   8. Decrease dose of IV solumedrol   9. Tube feedings   10. PPI for GI prophylaxis   11. Glucose level monitoring   12. DVT prophylaxis lovenox subcutaneous    Please contact me if you have any questions.  Total critical care time, not including separately billable procedure time: 45 minutes  This patient had a high probability of imminent or life threatening deterioration due to acute respiratory failure which required my direct attention, intervention and personal management.     Tano Conrad  Pulmonary / Critical Care  01/16/2022 10:40 AM        ICU DAILY CHECKLIST                            Can patient transfer out of MICU? no    FAST HUG:    Feeding:  Feeding: yes.  Patient is receiving tube feedings   Carvalho: no  Analgesia/Sedation:no    Thromboembolic prophylaxis: Yes; Mode:  Lovenox and SCDs  HOB>30:  Yes  Stress Ulcer Protocol Active: Yes; Mode: PPI  Glycemic Control: Any glucose > 180 no; Mode of Insulin Therapy: Sliding Scale Insulin and Long Acting Insulin    INTUBATED:  Can patient have daily waking:  No  Can patient have spontaneous breathing trial:  No    Restraints? yes    PHYSICAL THERAPY AND MOBILITY:  Can patient have PT and mobility trial: no  Activity: bedrest    Subjective:   HPI:  Geovanna Huff is a 66 year old female vaccinated against COVID19 with history of diabetes, chronic tobacco and alcohol abuse.  Presented on 1/8/22 with shortness of breath. Diagnosed with COVID19 viral infection.   Initially placed on nasal cannula at 5 L but quickly escalated to high flow nasal cannula and transferred to the ICU. Patient failed BiPAP. Intubated on 1/14.     Events overnight   - Afebrile  - Hemodynamically stable  - FiO2 requirements are coming down. Currently on 35%  - Tolerating tube feedings    Allergies: Patient has no known allergies.     MEDS:  Current Facility-Administered Medications   Medication     acetaminophen (TYLENOL) tablet 650 mg    Or     acetaminophen (TYLENOL) Suppository 650 mg     albuterol (PROVENTIL) neb solution 2.5 mg     [Held by provider] amLODIPine (NORVASC) tablet 10 mg     aspirin (ASA) chewable tablet 81 mg     [Held by provider] atenolol (TENORMIN) tablet 50 mg     ceFEPIme (MAXIPIME) 2 g vial to attach to  ml bag for ADULTS or 50 ml bag for PEDS     chlorhexidine (PERIDEX) 0.12 % solution 15 mL     [Held by provider] cloNIDine (CATAPRES) tablet 0.2 mg     dextrose 10% infusion     glucose gel 15-30 g    Or     dextrose 50 % injection 25-50 mL    Or     glucagon injection 1 mg     glucose gel 15-30 g    Or     dextrose 50 % injection  25-50 mL    Or     glucagon injection 1 mg     enoxaparin ANTICOAGULANT (LOVENOX) injection 40 mg     fentaNYL (SUBLIMAZE) 50 mcg/mL bolus from infusion pump 25-50 mcg     fentaNYL (SUBLIMAZE) infusion     flumazenil (ROMAZICON) injection 0.2 mg     folic acid (FOLVITE) tablet 1 mg     OLANZapine zydis (zyPREXA) ODT tab 5-10 mg    Or     haloperidol lactate (HALDOL) injection 2.5-5 mg     hydrALAZINE (APRESOLINE) injection 10-20 mg     insulin aspart (NovoLOG) injection (RAPID ACTING)     insulin glargine (LANTUS PEN) injection 15 Units     lidocaine (LMX4) cream     lidocaine 1 % 0.1-1 mL     LORazepam (ATIVAN) tablet 1-2 mg    Or     LORazepam (ATIVAN) injection 1-2 mg     Medication instructions: Do NOT use nebulized medications     melatonin tablet 5 mg     methylPREDNISolone sodium succinate (solu-MEDROL) injection 62.5 mg     midazolam (VERSED) bolus from infusion pump 1-2 mg     midazolam (VERSED) drip - ADULT 100 mg/100 mL in NS (pre-mix)     multivitamin w/minerals (THERA-VIT-M) tablet 1 tablet     naloxone (NARCAN) injection 0.2 mg    Or     naloxone (NARCAN) injection 0.4 mg    Or     naloxone (NARCAN) injection 0.2 mg    Or     naloxone (NARCAN) injection 0.4 mg     nicotine (NICODERM CQ) 21 MG/24HR 24 hr patch 1 patch     nicotine Patch in Place     norepinephrine (LEVOPHED) 4 mg in  mL infusion PREMIX     OLANZapine (zyPREXA) tablet 15 mg     ondansetron (ZOFRAN-ODT) ODT tab 4 mg    Or     ondansetron (ZOFRAN) injection 4 mg     pantoprazole (PROTONIX) IV push injection 40 mg     prochlorperazine (COMPAZINE) injection 5 mg    Or     prochlorperazine (COMPAZINE) tablet 5 mg    Or     prochlorperazine (COMPAZINE) suppository 12.5 mg     propofol (DIPRIVAN) infusion     sertraline (ZOLOFT) tablet 150 mg     sodium chloride (PF) 0.9% PF flush 10-20 mL     sodium chloride (PF) 0.9% PF flush 10-40 mL     sodium chloride (PF) 0.9% PF flush 10-40 mL     sodium chloride (PF) 0.9% PF flush 3 mL     sodium  "chloride (PF) 0.9% PF flush 3 mL       Objective:   VITALS:  /74   Pulse 73   Temp 98  F (36.7  C) (Oral)   Resp 10   Ht 1.575 m (5' 2\")   Wt 69.4 kg (153 lb)   SpO2 92%   BMI 27.98 kg/m    VENT:  Ventilation Mode: CMV/AC  (Continuous Mandatory Ventilation/ Assist Control)  FiO2 (%): 30 %  Rate Set (breaths/minute): 18 breaths/min  Tidal Volume Set (mL): 300 mL  PEEP (cm H2O): 10 cmH2O  Oxygen Concentration (%): 40 %  Resp: 10    EXAM:   Gen: sedated, vented  HEENT: pink conjunctiva, moist mucosa  Neck: no thyromegaly, masses or JVD  Lungs: discrete ronchi and wheezes both hemithorax  CV: regular, no murmurs or gallops appreciated  Abdomen: soft, NT, BS wnl  Ext: no edema  Neuro: sedated not arousable      Data Review:  Recent Labs   Lab 01/16/22  0534 01/16/22  0353 01/15/22  2328 01/15/22  2020 01/15/22  1556 01/15/22  1128   * 211* 234* 200* 183* 180*      1/16/2022 05:34   Sodium 137   Potassium 4.2   Chloride 109 (H)   Carbon Dioxide 22   Urea Nitrogen 23 (H)   Creatinine 1.01   GFR Estimate 61   Calcium 7.7 (L)   Anion Gap 6   Magnesium 2.0   Glucose 229 (H)   pH Arterial 7.34 (L)   pCO2 Arterial 44   PO2 Arterial 152 (H)   Bicarbonate Arterial 23   Base Excess Art -1.8   FIO2 0.4   Oxyhemoglobin 98.5 (H)   WBC 6.5   Hemoglobin 7.8 (L)   Hematocrit 24.3 (L)   Platelet Count 207       Respiratory   Gram Stain Result   Abnormal   <25 PMNs/low power field      1+ Gram positive cocci          XR CHEST PORT 1 VIEW  DATE/TIME: 1/10/2022 10:37 AM  INDICATION: worsening hypoxia  COMPARISON: Chest x-ray 05/21/2019 and CT chest 01/08/2022                IMPRESSION: Mild to moderate patchy bilateral multilobar airspace disease, most pronounced within the right upper lobe and medial lung bases and mildly progressed since the CT from 01/08/2022. No definite pleural effusion. Stable heart size.    XR CHEST PORT 1 VIEW  LOCATION: [facility]  DATE/TIME: 1/14/2022 6:52 " PM   INDICATION: Endotracheal tube positioning  COMPARISON: 1/10/2022  IMPRESSION: ET tube in good position with tip 2.4 cm above soy. NG tube stents in the stomach. Left PICC line in good position with tip low SVC.  Patchy mixed interstitial and groundglass infiltrates present bilaterally consistent with pneumonia. Borderline cardiomegaly.    CT CHEST PULMONARY EMBOLISM W CONTRAST  LOCATION: [facility]  DATE/TIME: 1/8/2022 5:43 PM  INDICATION: Difficulty breathing. Weakness. Patient reports recent positive Covid 19 test.  COMPARISON: None.  FINDINGS:  ANGIOGRAM CHEST: No pulmonary embolus. No aortic dissection or aneurysm.  LUNGS AND PLEURA: Moderate patchy groundglass infiltrate throughout both lungs worst in the right upper lobe. Mild bibasilar atelectasis. No pneumothorax.  MEDIASTINUM/AXILLAE: Small hiatal hernia.  CORONARY ARTERY CALCIFICATION: Moderate.  UPPER ABDOMEN: Normal.  MUSCULOSKELETAL: Degenerative changes of the spine.                    IMPRESSION:  1.  Moderate bilateral pulmonary infiltrates consistent with COVID 19 pneumonitis.  2.  No pulmonary embolus.  3.  Coronary artery disease.    XR CHEST 1 VIEW  LOCATION: [facility]  DATE/TIME: 1/16/2022 5:04 AM  INDICATION: Location of ET tube  COMPARISON: None.  IMPRESSION: Low endotracheal tube terminates at 1.7 cm from the soy, consider 3 to 4 cm retraction. Enteric tube terminates below diaphragm and field of view. Left PICC line with tip over SVC.   Improved aeration with mild residual right basilar opacity. No pneumothorax. Cardiomediastinal silhouette within normal limits    By:  Tano Conrad MD, 01/16/2022 10:48 AM  Primary Care Physician:  Rico Bui

## 2022-01-17 LAB
ANION GAP SERPL CALCULATED.3IONS-SCNC: 8 MMOL/L (ref 5–18)
BACTERIA SPEC CULT: NORMAL
BACTERIA SPT CULT: ABNORMAL
BASE EXCESS BLDA CALC-SCNC: -0.3 MMOL/L
BASOPHILS # BLD AUTO: 0 10E3/UL (ref 0–0.2)
BASOPHILS NFR BLD AUTO: 0 %
BUN SERPL-MCNC: 23 MG/DL (ref 8–22)
CALCIUM SERPL-MCNC: 7.5 MG/DL (ref 8.5–10.5)
CHLORIDE BLD-SCNC: 109 MMOL/L (ref 98–107)
CO2 SERPL-SCNC: 21 MMOL/L (ref 22–31)
COHGB MFR BLD: 94 % (ref 95–96)
CREAT SERPL-MCNC: 0.95 MG/DL (ref 0.6–1.1)
EOSINOPHIL # BLD AUTO: 0 10E3/UL (ref 0–0.7)
EOSINOPHIL NFR BLD AUTO: 1 %
ERYTHROCYTE [DISTWIDTH] IN BLOOD BY AUTOMATED COUNT: 14.8 % (ref 10–15)
GFR SERPL CREATININE-BSD FRML MDRD: 66 ML/MIN/1.73M2
GLUCOSE BLD-MCNC: 273 MG/DL (ref 70–125)
GLUCOSE BLDC GLUCOMTR-MCNC: 152 MG/DL (ref 70–99)
GLUCOSE BLDC GLUCOMTR-MCNC: 188 MG/DL (ref 70–99)
GLUCOSE BLDC GLUCOMTR-MCNC: 189 MG/DL (ref 70–99)
GLUCOSE BLDC GLUCOMTR-MCNC: 190 MG/DL (ref 70–99)
GLUCOSE BLDC GLUCOMTR-MCNC: 244 MG/DL (ref 70–99)
GLUCOSE BLDC GLUCOMTR-MCNC: 261 MG/DL (ref 70–99)
GLUCOSE BLDC GLUCOMTR-MCNC: 273 MG/DL (ref 70–99)
GRAM STAIN RESULT: ABNORMAL
GRAM STAIN RESULT: ABNORMAL
HCO3 BLD-SCNC: 24 MMOL/L (ref 23–29)
HCT VFR BLD AUTO: 24.9 % (ref 35–47)
HGB BLD-MCNC: 8.1 G/DL (ref 11.7–15.7)
IMM GRANULOCYTES # BLD: 0.2 10E3/UL
IMM GRANULOCYTES NFR BLD: 3 %
LYMPHOCYTES # BLD AUTO: 0.8 10E3/UL (ref 0.8–5.3)
LYMPHOCYTES NFR BLD AUTO: 13 %
MAGNESIUM SERPL-MCNC: 1.6 MG/DL (ref 1.8–2.6)
MCH RBC QN AUTO: 37 PG (ref 26.5–33)
MCHC RBC AUTO-ENTMCNC: 32.5 G/DL (ref 31.5–36.5)
MCV RBC AUTO: 114 FL (ref 78–100)
MONOCYTES # BLD AUTO: 0.4 10E3/UL (ref 0–1.3)
MONOCYTES NFR BLD AUTO: 5 %
NEUTROPHILS # BLD AUTO: 5.3 10E3/UL (ref 1.6–8.3)
NEUTROPHILS NFR BLD AUTO: 78 %
NRBC # BLD AUTO: 0 10E3/UL
NRBC BLD AUTO-RTO: 0 /100
O2/TOTAL GAS SETTING VFR VENT: 35 %
OXYHGB MFR BLD: 92.5 % (ref 95–96)
PCO2 BLD: 37 MM HG (ref 35–45)
PEEP: 5 CM H2O
PH BLD: 7.42 [PH] (ref 7.37–7.44)
PLATELET # BLD AUTO: 180 10E3/UL (ref 150–450)
PO2 BLD: 66 MM HG (ref 75–85)
POTASSIUM BLD-SCNC: 4.5 MMOL/L (ref 3.5–5)
PSV (LAB BLOOD GAS): 5 CM H2O
RBC # BLD AUTO: 2.19 10E6/UL (ref 3.8–5.2)
SODIUM SERPL-SCNC: 138 MMOL/L (ref 136–145)
TEMPERATURE: 37 DEGREES C
WBC # BLD AUTO: 6.5 10E3/UL (ref 4–11)

## 2022-01-17 PROCEDURE — 250N000009 HC RX 250: Performed by: INTERNAL MEDICINE

## 2022-01-17 PROCEDURE — 250N000013 HC RX MED GY IP 250 OP 250 PS 637: Performed by: INTERNAL MEDICINE

## 2022-01-17 PROCEDURE — 200N000001 HC R&B ICU

## 2022-01-17 PROCEDURE — 250N000011 HC RX IP 250 OP 636: Performed by: INTERNAL MEDICINE

## 2022-01-17 PROCEDURE — C9113 INJ PANTOPRAZOLE SODIUM, VIA: HCPCS | Performed by: INTERNAL MEDICINE

## 2022-01-17 PROCEDURE — 82805 BLOOD GASES W/O2 SATURATION: CPT | Performed by: INTERNAL MEDICINE

## 2022-01-17 PROCEDURE — 94640 AIRWAY INHALATION TREATMENT: CPT | Mod: 76

## 2022-01-17 PROCEDURE — 83735 ASSAY OF MAGNESIUM: CPT | Performed by: INTERNAL MEDICINE

## 2022-01-17 PROCEDURE — 99291 CRITICAL CARE FIRST HOUR: CPT | Performed by: INTERNAL MEDICINE

## 2022-01-17 PROCEDURE — 82310 ASSAY OF CALCIUM: CPT | Performed by: INTERNAL MEDICINE

## 2022-01-17 PROCEDURE — 94003 VENT MGMT INPAT SUBQ DAY: CPT

## 2022-01-17 PROCEDURE — 250N000009 HC RX 250: Performed by: EMERGENCY MEDICINE

## 2022-01-17 PROCEDURE — 94640 AIRWAY INHALATION TREATMENT: CPT

## 2022-01-17 PROCEDURE — 85025 COMPLETE CBC W/AUTO DIFF WBC: CPT | Performed by: INTERNAL MEDICINE

## 2022-01-17 PROCEDURE — 999N000157 HC STATISTIC RCP TIME EA 10 MIN

## 2022-01-17 PROCEDURE — 250N000013 HC RX MED GY IP 250 OP 250 PS 637: Performed by: FAMILY MEDICINE

## 2022-01-17 RX ORDER — IPRATROPIUM BROMIDE AND ALBUTEROL SULFATE 2.5; .5 MG/3ML; MG/3ML
3 SOLUTION RESPIRATORY (INHALATION)
Status: DISCONTINUED | OUTPATIENT
Start: 2022-01-17 | End: 2022-01-17

## 2022-01-17 RX ORDER — MAGNESIUM SULFATE HEPTAHYDRATE 40 MG/ML
2 INJECTION, SOLUTION INTRAVENOUS ONCE
Status: COMPLETED | OUTPATIENT
Start: 2022-01-17 | End: 2022-01-17

## 2022-01-17 RX ORDER — SIMVASTATIN 10 MG
40 TABLET ORAL AT BEDTIME
Status: DISCONTINUED | OUTPATIENT
Start: 2022-01-17 | End: 2022-01-28 | Stop reason: HOSPADM

## 2022-01-17 RX ORDER — ALBUTEROL SULFATE 0.83 MG/ML
2.5 SOLUTION RESPIRATORY (INHALATION) EVERY 6 HOURS PRN
Status: DISCONTINUED | OUTPATIENT
Start: 2022-01-17 | End: 2022-01-26

## 2022-01-17 RX ORDER — AMLODIPINE BESYLATE 5 MG/1
10 TABLET ORAL DAILY
Status: DISCONTINUED | OUTPATIENT
Start: 2022-01-17 | End: 2022-01-28 | Stop reason: HOSPADM

## 2022-01-17 RX ORDER — IPRATROPIUM BROMIDE AND ALBUTEROL SULFATE 2.5; .5 MG/3ML; MG/3ML
3 SOLUTION RESPIRATORY (INHALATION)
Status: DISCONTINUED | OUTPATIENT
Start: 2022-01-17 | End: 2022-01-22

## 2022-01-17 RX ADMIN — FOLIC ACID 1 MG: 1 TABLET ORAL at 08:00

## 2022-01-17 RX ADMIN — ALBUTEROL SULFATE 2.5 MG: 2.5 SOLUTION RESPIRATORY (INHALATION) at 22:07

## 2022-01-17 RX ADMIN — ENOXAPARIN SODIUM 40 MG: 40 INJECTION SUBCUTANEOUS at 08:01

## 2022-01-17 RX ADMIN — PANTOPRAZOLE SODIUM 40 MG: 40 INJECTION, POWDER, FOR SOLUTION INTRAVENOUS at 08:00

## 2022-01-17 RX ADMIN — METHYLPREDNISOLONE SODIUM SUCCINATE 40 MG: 40 INJECTION, POWDER, FOR SOLUTION INTRAMUSCULAR; INTRAVENOUS at 18:17

## 2022-01-17 RX ADMIN — ALBUTEROL SULFATE 2.5 MG: 2.5 SOLUTION RESPIRATORY (INHALATION) at 08:08

## 2022-01-17 RX ADMIN — SERTRALINE HYDROCHLORIDE 150 MG: 50 TABLET ORAL at 08:22

## 2022-01-17 RX ADMIN — IPRATROPIUM BROMIDE AND ALBUTEROL SULFATE 3 ML: .5; 3 SOLUTION RESPIRATORY (INHALATION) at 15:00

## 2022-01-17 RX ADMIN — CLONIDINE HYDROCHLORIDE 0.2 MG: 0.1 TABLET ORAL at 21:05

## 2022-01-17 RX ADMIN — HYDRALAZINE HYDROCHLORIDE 10 MG: 20 INJECTION INTRAMUSCULAR; INTRAVENOUS at 20:05

## 2022-01-17 RX ADMIN — ALBUTEROL SULFATE 2.5 MG: 2.5 SOLUTION RESPIRATORY (INHALATION) at 02:13

## 2022-01-17 RX ADMIN — SIMVASTATIN 40 MG: 40 TABLET, FILM COATED ORAL at 20:27

## 2022-01-17 RX ADMIN — FENTANYL CITRATE 100 MCG/HR: 50 INJECTION, SOLUTION INTRAMUSCULAR; INTRAVENOUS at 06:41

## 2022-01-17 RX ADMIN — IPRATROPIUM BROMIDE AND ALBUTEROL SULFATE 3 ML: .5; 3 SOLUTION RESPIRATORY (INHALATION) at 10:00

## 2022-01-17 RX ADMIN — AMLODIPINE BESYLATE 10 MG: 5 TABLET ORAL at 21:04

## 2022-01-17 RX ADMIN — OLANZAPINE 15 MG: 10 TABLET, FILM COATED ORAL at 20:27

## 2022-01-17 RX ADMIN — CEFTRIAXONE SODIUM 2 G: 2 INJECTION, POWDER, FOR SOLUTION INTRAMUSCULAR; INTRAVENOUS at 18:17

## 2022-01-17 RX ADMIN — HYDRALAZINE HYDROCHLORIDE 20 MG: 20 INJECTION INTRAMUSCULAR; INTRAVENOUS at 00:04

## 2022-01-17 RX ADMIN — ENOXAPARIN SODIUM 40 MG: 40 INJECTION SUBCUTANEOUS at 20:14

## 2022-01-17 RX ADMIN — METHYLPREDNISOLONE SODIUM SUCCINATE 40 MG: 40 INJECTION, POWDER, FOR SOLUTION INTRAMUSCULAR; INTRAVENOUS at 06:35

## 2022-01-17 RX ADMIN — HYDRALAZINE HYDROCHLORIDE 20 MG: 20 INJECTION INTRAMUSCULAR; INTRAVENOUS at 17:46

## 2022-01-17 RX ADMIN — MAGNESIUM SULFATE HEPTAHYDRATE 2 G: 40 INJECTION, SOLUTION INTRAVENOUS at 08:27

## 2022-01-17 RX ADMIN — CHLORHEXIDINE GLUCONATE 0.12% ORAL RINSE 15 ML: 1.2 LIQUID ORAL at 20:16

## 2022-01-17 RX ADMIN — Medication 1 TABLET: at 08:23

## 2022-01-17 RX ADMIN — ASPIRIN 81 MG CHEWABLE TABLET 81 MG: 81 TABLET CHEWABLE at 08:00

## 2022-01-17 RX ADMIN — CHLORHEXIDINE GLUCONATE 0.12% ORAL RINSE 15 ML: 1.2 LIQUID ORAL at 08:00

## 2022-01-17 RX ADMIN — DEXMEDETOMIDINE HYDROCHLORIDE 0.5 MCG/KG/HR: 400 INJECTION INTRAVENOUS at 11:40

## 2022-01-17 ASSESSMENT — ACTIVITIES OF DAILY LIVING (ADL)
ADLS_ACUITY_SCORE: 13
ADLS_ACUITY_SCORE: 15
ADLS_ACUITY_SCORE: 12
ADLS_ACUITY_SCORE: 15
ADLS_ACUITY_SCORE: 13
ADLS_ACUITY_SCORE: 15
ADLS_ACUITY_SCORE: 15
ADLS_ACUITY_SCORE: 12

## 2022-01-17 NOTE — PROGRESS NOTES
Due to low saturations, patient lavaged X 3. Thick/yellow secretions suctioned. Nebs given; pt remains vented/full support. Current vent settings: AC 16 320 40% 8. PIP 19 cmH2O, Pplat 17 cmH2O; sats 94%. BS coarse. RT following.    Manoj Hernandez, RT

## 2022-01-17 NOTE — PROGRESS NOTES
After successful wean, extubated to 5LNC with an SpO2 of 95%.  PT using yankauer at bedside.  Encouraged to continue to cough up secretions.    Aden Kennedy, RT  1/17/2022

## 2022-01-17 NOTE — PROGRESS NOTES
CLINICAL NUTRITION SERVICES - Brief Note     Nutrition Prescription    RECOMMENDATIONS FOR MDs/PROVIDERS TO ORDER:      Malnutrition Status:    Severe noted 1/14/22    Recommendations already ordered by Registered Dietitian (RD):  Cancelled oral diet.  Adjust TF to meet estimated needs.  New goal rate 45 ml/hr.  Jevity 1.2  @ goal of  45ml/hr  (1080ml/day)  will provide: 1296 kcals, 60 g PRO, 871 ml free H20 from formula, 360 ml flushes, 182 g CHO, and 18 g fiber daily.    Future/Additional Recommendations:  TF tolerance.     EVALUATION OF THE PROGRESS TOWARD GOALS   Diet: NPO  Nutrition Support: Jevity 1.2 at goal rate 40 ml/hr.  FWF 60 ml every 4 hrs.  Intake: Jevity 1.2  @ goal of  40ml/hr  (960ml/day)  will provide: 1152 kcals, 53 g PRO, 774 ml free H20 from formula, 360 ml flushes, 162 g CHO, and 16 g fiber daily.   Pt has OG.       NEW FINDINGS   MD started TF 1/14 after pt intubated.   TF at goal rate.  LBM 1/14/22.

## 2022-01-17 NOTE — PROGRESS NOTES
CRITICAL CARE PROGRESS NOTE:    Assessment/Plan:  66 year old female with a history of DM, tobacco dependence, alcohol dependence, likely undiagnosed COPD, COVID-19 infection diagnosed on 12/29, now with acute hypoxemic respiratory failure and ARDS, intubated on 1/14    RESP:  Acute hypoxemic respiratory failure: ARDS due to COVID-19. Likely undiagnosed COPD. Intubated on 1/14.  Ventilation Mode: CPAP/PS  (Continuous positive airway pressure with Pressure Support)  FiO2 (%): 35 %  Rate Set (breaths/minute): 16 breaths/min  Tidal Volume Set (mL): 320 mL  PEEP (cm H2O): 5 cmH2O  Pressure Support (cm H2O): 5 cmH2O  Oxygen Concentration (%): 35 %  Resp: 15      Lung-protective ventilation    Change from albuterol to ipratropium-albuterol    Methylprednisolone 40 mg IV BID for AECOPD    See ID below    Sedation holiday and SBT; possible extubation soon    CV:  Hypertension, shock: Baseline essential HTN on amlodipine, clonidine, atenolol. Now with likely sedation-induced vasodilation.    Hold atenolol, amlodipine, clonidine    Off pressors    Restart antihypertensives as clinically indicated    Restart statin    NEURO:  Sedation/analgesia:     dexmedetomidine    Fentanyl    Sertraline 150 mg daily (home med)    RASS goal 0 to -1    GI:  Protein-calorie malnutrition, constipation:    Tube feeding    Bowel regimen    RENAL:  No acute issues.    monitor    ID:  COVID-19 infection: AECOPD, possib.e acute bronchitis/CAP.    Ceftriaxone started 1/14    HEMATOLOGIC:  Anemia: Chronic anemia with acute inflammatory process. No evidence of active hemorrhage.    Monitor with restrictive transfusion threshold    ENDOCRINE:  IDDM: Worsened by acute stress and steroids.    Increase glargine from 20 units BID to 25 units BID    Change sliding scale aspart to very high resistance scale    ICU PROPHYLAXIS:    Enoxaparin    PPI    HOB elevation    chlorhexidine    Lines/Drains/Tubes:  Central line (LUE PICC) placed on 1/10  Arterial line  "(brachial) placed on 1/14  Feeding tube (OG) placed on 1/15  Carvalho catheter placed on 1/15  ET (7.0) placed on 1/14    CODE STATUS, DISPOSITION, FAMILY COMMUNICATION: Full code. Critically ill requiring invasive mechanical ventilation. Attempted to contact the emergency contact, Rah, without success.    Restraints  Progress Note  Restraint Application    I recognize that restraints are physical and/or chemical interventions intended to restrict a person's movements. Restraints are currently needed to ensure the safety of this patient and/or others. My clinical rationale appears below.    Category/Type of Restraint     Non Violent:  Soft limb restraint x2  --  Behavior  Pulling at tubes/lines  --  Root Cause of the Behavior  Sedation/intubation  --  Less-Restrictive Measures that Failed  Non Violent Measures:  Close Observation  --  Response to the Restraint  Patient unable to pull at tubes/lines  --  Criteria for Release from the Restraint  Patient calm and off sedation    Jesus Manuel Laguerre MD  Pulmonary and Critical Care Medicine  [facility]  Office [phone]  Pager [phone]  (he/him/his)    Overnight events:  No new events.    Subjective:  unable    Objective:  Physical Exam:  Vent settings for last 24 hours:  Ventilation Mode: CPAP/PS  (Continuous positive airway pressure with Pressure Support)  FiO2 (%): 35 %  Rate Set (breaths/minute): 16 breaths/min  Tidal Volume Set (mL): 320 mL  PEEP (cm H2O): 5 cmH2O  Pressure Support (cm H2O): 5 cmH2O  Oxygen Concentration (%): 35 %  Resp: 16      /52   Pulse 74   Temp 99.2  F (37.3  C) (Oral)   Resp 16   Ht 1.575 m (5' 2\")   Wt 69.4 kg (153 lb)   SpO2 95%   BMI 27.98 kg/m      Intake/Output last 3 shifts:  I/O last 3 completed shifts:  In: 1854.01 [I.V.:534.01; NG/GT:1000]  Out: 1100 [Urine:1100]  Intake/Output this shift:  I/O this shift:  In: 302.8 [I.V.:42.8; NG/GT:100]  Out: 275 [Urine:275]    Physical Exam  Gen: intubated, sedated  HEENT: " no OP lesions, no YAEL  CV: RRR, no m/g/r  Resp: diminished bilaterally with rhonchi  Abd: soft, nontender, BS+  Neuro: PERRL, sedated, will open eyes and nod/shake to questions, will follow commands  Ext: no edema    LAB:  Recent Labs   Lab 01/17/22  0508   WBC 6.5   HGB 8.1*   HCT 24.9*        Recent Labs   Lab 01/17/22  0508 01/16/22  0534 01/15/22  0339    137 139   CO2 21* 22 20*   BUN 23* 23* 20       No current outpatient medications on file.       Total Critical Care Time: 55 Minutes

## 2022-01-17 NOTE — PLAN OF CARE
Problem: Adult Inpatient Plan of Care  Goal: Plan of Care Review  Outcome: Improving   Care plan reviewed.  Problem: Gas Exchange Impaired  Goal: Optimal Gas Exchange  Outcome: Improving  Intervention: Optimize Oxygenation and Ventilation  Recent Flowsheet Documentation  Taken 1/16/2022 2000 by Adam Garrett, RN  Head of Bed (HOB) Positioning: HOB at 30 degrees  Taken 1/16/2022 1800 by Adam Garrett, RN  Head of Bed (HOB) Positioning: HOB at 30 degrees  Taken 1/16/2022 1600 by Adam Garrett, RN  Head of Bed (HOB) Positioning: HOB at 30 degrees

## 2022-01-17 NOTE — PROGRESS NOTES
Respiratory Care Note    Pt placed on wean 5/5 tolerating well RR 13, VTe 477, MVe 5.85, RSBI 27, NIF -16.3.      Byron Masterson, RT

## 2022-01-18 ENCOUNTER — APPOINTMENT (OUTPATIENT)
Dept: PHYSICAL THERAPY | Facility: HOSPITAL | Age: 67
DRG: 208 | End: 2022-01-18
Attending: INTERNAL MEDICINE
Payer: COMMERCIAL

## 2022-01-18 ENCOUNTER — APPOINTMENT (OUTPATIENT)
Dept: SPEECH THERAPY | Facility: HOSPITAL | Age: 67
DRG: 208 | End: 2022-01-18
Attending: INTERNAL MEDICINE
Payer: COMMERCIAL

## 2022-01-18 ENCOUNTER — APPOINTMENT (OUTPATIENT)
Dept: OCCUPATIONAL THERAPY | Facility: HOSPITAL | Age: 67
DRG: 208 | End: 2022-01-18
Attending: INTERNAL MEDICINE
Payer: COMMERCIAL

## 2022-01-18 LAB
ERYTHROCYTE [DISTWIDTH] IN BLOOD BY AUTOMATED COUNT: 14.9 % (ref 10–15)
GLUCOSE BLDC GLUCOMTR-MCNC: 103 MG/DL (ref 70–99)
GLUCOSE BLDC GLUCOMTR-MCNC: 123 MG/DL (ref 70–99)
GLUCOSE BLDC GLUCOMTR-MCNC: 178 MG/DL (ref 70–99)
GLUCOSE BLDC GLUCOMTR-MCNC: 87 MG/DL (ref 70–99)
GLUCOSE BLDC GLUCOMTR-MCNC: 96 MG/DL (ref 70–99)
HCT VFR BLD AUTO: 23.8 % (ref 35–47)
HGB BLD-MCNC: 7.6 G/DL (ref 11.7–15.7)
MAGNESIUM SERPL-MCNC: 1.7 MG/DL (ref 1.8–2.6)
MCH RBC QN AUTO: 36.4 PG (ref 26.5–33)
MCHC RBC AUTO-ENTMCNC: 31.9 G/DL (ref 31.5–36.5)
MCV RBC AUTO: 114 FL (ref 78–100)
PLATELET # BLD AUTO: 173 10E3/UL (ref 150–450)
POTASSIUM BLD-SCNC: 3.7 MMOL/L (ref 3.5–5)
RBC # BLD AUTO: 2.09 10E6/UL (ref 3.8–5.2)
WBC # BLD AUTO: 6.6 10E3/UL (ref 4–11)

## 2022-01-18 PROCEDURE — 99233 SBSQ HOSP IP/OBS HIGH 50: CPT | Performed by: INTERNAL MEDICINE

## 2022-01-18 PROCEDURE — 94640 AIRWAY INHALATION TREATMENT: CPT | Mod: 76

## 2022-01-18 PROCEDURE — 97535 SELF CARE MNGMENT TRAINING: CPT | Mod: GO

## 2022-01-18 PROCEDURE — 250N000011 HC RX IP 250 OP 636: Performed by: INTERNAL MEDICINE

## 2022-01-18 PROCEDURE — 120N000001 HC R&B MED SURG/OB

## 2022-01-18 PROCEDURE — 97162 PT EVAL MOD COMPLEX 30 MIN: CPT | Mod: GP

## 2022-01-18 PROCEDURE — C9113 INJ PANTOPRAZOLE SODIUM, VIA: HCPCS | Performed by: INTERNAL MEDICINE

## 2022-01-18 PROCEDURE — 250N000013 HC RX MED GY IP 250 OP 250 PS 637: Performed by: INTERNAL MEDICINE

## 2022-01-18 PROCEDURE — 92526 ORAL FUNCTION THERAPY: CPT | Mod: GN

## 2022-01-18 PROCEDURE — 97166 OT EVAL MOD COMPLEX 45 MIN: CPT | Mod: GO

## 2022-01-18 PROCEDURE — 94640 AIRWAY INHALATION TREATMENT: CPT

## 2022-01-18 PROCEDURE — 250N000013 HC RX MED GY IP 250 OP 250 PS 637: Performed by: FAMILY MEDICINE

## 2022-01-18 PROCEDURE — 999N000157 HC STATISTIC RCP TIME EA 10 MIN

## 2022-01-18 PROCEDURE — 84132 ASSAY OF SERUM POTASSIUM: CPT | Performed by: INTERNAL MEDICINE

## 2022-01-18 PROCEDURE — 85027 COMPLETE CBC AUTOMATED: CPT | Performed by: FAMILY MEDICINE

## 2022-01-18 PROCEDURE — 83735 ASSAY OF MAGNESIUM: CPT | Performed by: INTERNAL MEDICINE

## 2022-01-18 PROCEDURE — 250N000009 HC RX 250: Performed by: INTERNAL MEDICINE

## 2022-01-18 PROCEDURE — 97530 THERAPEUTIC ACTIVITIES: CPT | Mod: GP

## 2022-01-18 PROCEDURE — 97530 THERAPEUTIC ACTIVITIES: CPT | Mod: GO

## 2022-01-18 PROCEDURE — 92610 EVALUATE SWALLOWING FUNCTION: CPT | Mod: GN

## 2022-01-18 RX ORDER — PREDNISONE 20 MG/1
40 TABLET ORAL DAILY
Status: DISCONTINUED | OUTPATIENT
Start: 2022-01-19 | End: 2022-01-18

## 2022-01-18 RX ORDER — POTASSIUM CHLORIDE 1500 MG/1
20 TABLET, EXTENDED RELEASE ORAL ONCE
Status: COMPLETED | OUTPATIENT
Start: 2022-01-18 | End: 2022-01-18

## 2022-01-18 RX ORDER — MAGNESIUM OXIDE 400 MG/1
400 TABLET ORAL 2 TIMES DAILY
Status: COMPLETED | OUTPATIENT
Start: 2022-01-18 | End: 2022-01-19

## 2022-01-18 RX ORDER — PREDNISONE 20 MG/1
40 TABLET ORAL DAILY
Status: COMPLETED | OUTPATIENT
Start: 2022-01-19 | End: 2022-01-21

## 2022-01-18 RX ORDER — PREDNISONE 20 MG/1
20 TABLET ORAL DAILY
Status: COMPLETED | OUTPATIENT
Start: 2022-01-25 | End: 2022-01-27

## 2022-01-18 RX ORDER — PREDNISONE 5 MG/1
10 TABLET ORAL DAILY
Status: DISCONTINUED | OUTPATIENT
Start: 2022-01-28 | End: 2022-01-28 | Stop reason: HOSPADM

## 2022-01-18 RX ADMIN — SERTRALINE HYDROCHLORIDE 150 MG: 50 TABLET ORAL at 09:14

## 2022-01-18 RX ADMIN — CEFTRIAXONE SODIUM 2 G: 2 INJECTION, POWDER, FOR SOLUTION INTRAMUSCULAR; INTRAVENOUS at 18:33

## 2022-01-18 RX ADMIN — OLANZAPINE 15 MG: 10 TABLET, FILM COATED ORAL at 20:49

## 2022-01-18 RX ADMIN — CLONIDINE HYDROCHLORIDE 0.2 MG: 0.1 TABLET ORAL at 09:12

## 2022-01-18 RX ADMIN — IPRATROPIUM BROMIDE AND ALBUTEROL SULFATE 3 ML: 2.5; .5 SOLUTION RESPIRATORY (INHALATION) at 08:05

## 2022-01-18 RX ADMIN — ATENOLOL 50 MG: 25 TABLET ORAL at 09:14

## 2022-01-18 RX ADMIN — ASPIRIN 81 MG CHEWABLE TABLET 81 MG: 81 TABLET CHEWABLE at 09:12

## 2022-01-18 RX ADMIN — CHLORHEXIDINE GLUCONATE 0.12% ORAL RINSE 15 ML: 1.2 LIQUID ORAL at 20:50

## 2022-01-18 RX ADMIN — CLONIDINE HYDROCHLORIDE 0.2 MG: 0.1 TABLET ORAL at 20:50

## 2022-01-18 RX ADMIN — MAGNESIUM OXIDE TAB 400 MG (241.3 MG ELEMENTAL MG) 400 MG: 400 (241.3 MG) TAB at 09:25

## 2022-01-18 RX ADMIN — METHYLPREDNISOLONE SODIUM SUCCINATE 40 MG: 40 INJECTION, POWDER, FOR SOLUTION INTRAMUSCULAR; INTRAVENOUS at 06:20

## 2022-01-18 RX ADMIN — PANTOPRAZOLE SODIUM 40 MG: 40 INJECTION, POWDER, FOR SOLUTION INTRAVENOUS at 09:20

## 2022-01-18 RX ADMIN — IPRATROPIUM BROMIDE AND ALBUTEROL SULFATE 3 ML: 2.5; .5 SOLUTION RESPIRATORY (INHALATION) at 19:36

## 2022-01-18 RX ADMIN — MAGNESIUM OXIDE TAB 400 MG (241.3 MG ELEMENTAL MG) 400 MG: 400 (241.3 MG) TAB at 20:49

## 2022-01-18 RX ADMIN — POTASSIUM CHLORIDE 20 MEQ: 20 TABLET, EXTENDED RELEASE ORAL at 09:09

## 2022-01-18 RX ADMIN — ENOXAPARIN SODIUM 40 MG: 40 INJECTION SUBCUTANEOUS at 20:50

## 2022-01-18 RX ADMIN — Medication 1 TABLET: at 09:13

## 2022-01-18 RX ADMIN — SIMVASTATIN 40 MG: 40 TABLET, FILM COATED ORAL at 20:49

## 2022-01-18 RX ADMIN — ENOXAPARIN SODIUM 40 MG: 40 INJECTION SUBCUTANEOUS at 09:20

## 2022-01-18 RX ADMIN — IPRATROPIUM BROMIDE AND ALBUTEROL SULFATE 3 ML: 2.5; .5 SOLUTION RESPIRATORY (INHALATION) at 14:49

## 2022-01-18 RX ADMIN — FOLIC ACID 1 MG: 1 TABLET ORAL at 09:13

## 2022-01-18 RX ADMIN — AMLODIPINE BESYLATE 10 MG: 5 TABLET ORAL at 09:13

## 2022-01-18 RX ADMIN — CHLORHEXIDINE GLUCONATE 0.12% ORAL RINSE 15 ML: 1.2 LIQUID ORAL at 09:21

## 2022-01-18 ASSESSMENT — ACTIVITIES OF DAILY LIVING (ADL)
ADLS_ACUITY_SCORE: 11
ADLS_ACUITY_SCORE: 17
ADLS_ACUITY_SCORE: 17
ADLS_ACUITY_SCORE: 12
ADLS_ACUITY_SCORE: 11
ADLS_ACUITY_SCORE: 17
ADLS_ACUITY_SCORE: 12
ADLS_ACUITY_SCORE: 11
ADLS_ACUITY_SCORE: 16
ADLS_ACUITY_SCORE: 11
ADLS_ACUITY_SCORE: 17
ADLS_ACUITY_SCORE: 11
ADLS_ACUITY_SCORE: 21
ADLS_ACUITY_SCORE: 21
ADLS_ACUITY_SCORE: 11
ADLS_ACUITY_SCORE: 12
ADLS_ACUITY_SCORE: 11
ADLS_ACUITY_SCORE: 16
ADLS_ACUITY_SCORE: 16
ADLS_ACUITY_SCORE: 11

## 2022-01-18 NOTE — PROGRESS NOTES
Care Management Follow Up    Length of Stay (days): 10    Expected Discharge Date: 01/24/2022     Concerns to be Addressed: clinical progression, oxygen needs, COVID 19 precautions       Patient plan of care discussed at interdisciplinary rounds: Yes    Anticipated Discharge Disposition: TCU, OT/PT evaluation pending     Anticipated Discharge Services: anticipated TCU, referral to [facility]    Anticipated Discharge DME:  Not at this time    Patient/family educated on Medicare website which has current facility and service quality ratings:  Not at this time, in COVID 19 precautions  Education Provided on the Discharge Plan: per team    Patient/Family in Agreement with the Plan: yes      Referrals Placed by CM/SW: not at this time   Private pay costs discussed: Not applicable    Additional Information:  Chart reviewed. Anticipate need for TCU, referral made to [facility], COVID + accepting facility. OT/PT evaluation pending.       Jessica Reza RN

## 2022-01-18 NOTE — PROGRESS NOTES
Physical Therapy        01/18/22 1130   Quick Adds   Type of Visit Initial PT Evaluation   Living Environment   People in home significant other   Current Living Arrangements house   Home Accessibility no concerns   Transportation Anticipated family or friend will provide   Self-Care   Equipment Currently Used at Home none   Activity/Exercise/Self-Care Comment independent at baseline   General Information   Onset of Illness/Injury or Date of Surgery 01/08/22   Referring Physician Jesus Manuel Laguerre MD   Patient/Family Therapy Goals Statement (PT) get stronger   Pertinent History of Current Problem (include personal factors and/or comorbidities that impact the POC) COVID pneumonia, vented 1/14-1/17   Existing Precautions/Restrictions no known precautions/restrictions   Pain Assessment   Patient Currently in Pain No   Range of Motion (ROM)   ROM Quick Adds ROM WNL   Strength   Manual Muscle Testing Quick Adds Deficits observed during functional mobility   Strength Comments marked deconditioned   Bed Mobility   Comment (Bed Mobility) Thor supine to sit   Transfers   Transfer Safety Comments Thor sit<>stand with and without FWW and stand-pivot left    Gait/Stairs (Locomotion)   Isabela Level (Gait) minimum assist (75% patient effort)   Assistive Device (Gait) walker, front-wheeled   Distance in Feet (Required for LE Total Joints) 2'   Pattern (Gait) step-to   Comment (Gait/Stairs) right trunk lean, unsteady, poor foot clearance   Clinical Impression   Criteria for Skilled Therapeutic Intervention yes, treatment indicated   PT Diagnosis (PT) unsteady gait   Influenced by the following impairments weakness/deconditioning   Functional limitations due to impairments limited household mobility   Clinical Presentation Stable/Uncomplicated   Clinical Presentation Rationale presents as medically diagnosed   Clinical Decision Making (Complexity) moderate complexity   Therapy Frequency (PT) Daily   Predicted Duration of  Therapy Intervention (days/wks) 1 week   Planned Therapy Interventions (PT) balance training;bed mobility training;gait training;home exercise program;strengthening;transfer training;progressive activity/exercise   Anticipated Equipment Needs at Discharge (PT) walker, rolling   Risk & Benefits of therapy have been explained evaluation/treatment results reviewed;care plan/treatment goals reviewed;participants voiced agreement with care plan;participants included;patient   PT Discharge Planning    PT Discharge Recommendation (DC Rec) Transitional Care Facility;home with home care physical therapy;home with assist   PT Rationale for DC Rec min-modAx1 required for mobility, high fall risk. would need WC and 24 hr care for d/c home   Total Evaluation Time   Total Evaluation Time (Minutes) 12       Candy Choudhury DPT 1/18/2022

## 2022-01-18 NOTE — PLAN OF CARE
Problem: Adult Inpatient Plan of Care  Goal: Optimal Comfort and Wellbeing  Outcome: Improving     Problem: Gas Exchange Impaired  Goal: Optimal Gas Exchange  Outcome: Improving  Intervention: Optimize Oxygenation and Ventilation  Recent Flowsheet Documentation  Taken 1/18/2022 0916 by Pennie Ferrell RN  Head of Bed (HOB) Positioning: HOB at 30 degrees   Patient has been lethargic but oriented today. PT/OT  and up to chair for lunch.   Speech determined patient is safe with regular textures and thin liquids. Denied pain. Ate fair. Took all scheduled meds whole. NSR-Tele. O2 @ 4l sats mid 90's. Large incontinent void today.

## 2022-01-18 NOTE — PLAN OF CARE
Problem: Gas Exchange Impaired  Goal: Optimal Gas Exchange  Outcome: No Change   Pt is sating 93-95% on 5L of oxygen. Pt is alert to self but disoriented to time and situation. Pt very drowsy falls right back to sleep after talking. Carvalho removed right before transfer to the floor from ICU. Pt has not voided yet and says she doesn't need to go yet. PICC to LUE saline locked. Blood return noted. Pt has intermittent cough. BG 87.  Rossy Metcalf RN

## 2022-01-18 NOTE — PROGRESS NOTES
01/18/22 1405   Quick Adds   Type of Visit Initial Occupational Therapy Evaluation   Living Environment   People in home spouse   Current Living Arrangements house   Self-Care   Usual Activity Tolerance moderate   Current Activity Tolerance fair   Instrumental Activities of Daily Living (IADL)   IADL Comments personal cares   General Information   Onset of Illness/Injury or Date of Surgery 01/08/22   Referring Physician carla borja   Cognitive Status Examination   Orientation Status person   Range of Motion Comprehensive   General Range of Motion no range of motion deficits identified   Bed Mobility   Bed Mobility rolling left;rolling right;supine-sit;sit-supine   Rolling Left [place] (Bed Mobility) minimum assist (75% patient effort)   Rolling Right [place] (Bed Mobility) minimum assist (75% patient effort)   Supine-Sit [place] (Bed Mobility) moderate assist (50% patient effort)   Sit-Supine [place] (Bed Mobility) moderate assist (50% patient effort)   Balance   Balance Comments sba-mod a sitting eob/very weak   Clinical Impression   Criteria for Skilled Therapeutic Interventions Met (OT) yes   OT Diagnosis dereased adl's   Assessment of Occupational Performance 1-3 Performance Deficits   Planned Therapy Interventions (OT) ADL retraining;strengthening;transfer training   Clinical Decision Making Complexity (OT) low complexity   Therapy Frequency (OT) Daily   Predicted Duration of Therapy 7 days   Risk & Benefits of therapy have been explained evaluation/treatment results reviewed   OT Discharge Planning    OT Discharge Recommendation (DC Rec) Transitional Care Facility   Total Evaluation Time (Minutes)   Total Evaluation Time (Minutes) 10

## 2022-01-18 NOTE — PROGRESS NOTES
[facility]    PROGRESS NOTE - Hospitalist Service    ASSESSMENT AND PLAN     Active Problems:    Cough    Shortness of breath    Hypoxia    Acute kidney injury (H)    Elevated d-dimer    Infection due to 2019 novel coronavirus    Nonspecific elevation of levels of transaminase and lactic acid dehydrogenase (LDH)    Geovanna Huff is a 66 year old female with a history of diabetes, chronic tobacco abuse and alcohol abuse admitted to the hospital with acute respiratory failure and Covid infection     Acute hypoxic respiratory failure secondary to COVID-19 viral pneumonia/ARDS   Was transferred to the ICU on 1/10.  Intubated from 1/14-1/17              Symptom onset 12/28-unclear vaccination status.  Patient states she thinks she got J and J              Chest CT with contrast showed Covid pneumonia, no PE   Inhalers four times a day and PRN              Dexamethasone 6 mg x 10 days initiated 1/8/2022 and Remdesivir x 5 days Initiated on 1/8/2022              Lovenox 40mg daily    Acute exacerbation COPD   Was initiated on Decadron 1/8 through 1/14.  Has also been receiving Solu-Medrol starting 1/14.  Solu-Medrol discontinued 1/18.  Prednisone taper will be initiated 1/19.   Ceftriaxone initiated 1/16 for CAP    Acute metabolic encephalopathy secondary to recent extubation and ICU stay   Speech to see and give recommendations regarding diet     Lactic acidosis               Resolved with IV fluids      Hypokalemia              Replace per protocol     ROSINA- resolved with IV fluids      Macrocytic anemia              Has remained stable this admission.     No signs of acute bleed              suspect related to chronic alcohol abuse.       Alcohol dependence               Admits to drinking heavily rum and Cokes but states that she quit drinking 2 weeks PTA      Schizoaffective disorder              Home Zyprexa, Zoloft     Hypertension              Home amlodipine and atenolol and clonidine  with hold parameters   Hydralazine as needed     DM-II              Poor oral intake              Decrease Lantus to 25 units at bedtime.  Home dosing is 15 units at bedtime with metformin               Hold metformin              Diabetes order set used.  A1C of 4.7      Hyperlipidemia-              LFTs mildly up- hold home Zocor for now     GERD-              Previous ulcer association with her alcohol abuse.  Continue PPI     Tobacco abuse-              Nicotine patch    Severe protein calorie malnutrition      DVT Prophylaxis: Enoxaparin (Lovenox) SQ     Barriers to discharge: placement, Hypoxia     Anticipated length of stay: 1-3 days    Subjective:  Patient resting comfortably in bed.  Asking if she can eat.  No other complaints.  Notes breathing is improved.    PHYSICAL EXAM  Temp:  [97.9  F (36.6  C)-99.6  F (37.6  C)] 97.9  F (36.6  C)  Pulse:  [] 96  Resp:  [13-34] 20  BP: (131-181)/(56-96) 181/96  MAP:  [67 mmHg-126 mmHg] 89 mmHg  Arterial Line BP: (104-182)/(44-82) 132/60  SpO2:  [90 %-99 %] 90 %  Wt Readings from Last 1 Encounters:   01/14/22 69.4 kg (153 lb)       Intake/Output Summary (Last 24 hours) at 1/18/2022 1125  Last data filed at 1/18/2022 0100  Gross per 24 hour   Intake 530.23 ml   Output 1600 ml   Net -1069.77 ml      Body mass index is 27.98 kg/m .    GENRL:  Alert and answering questions on exam.  No respiratory distress.  Propped up in bed   HEENT: no lymphadenopathy or thyromegaly  CHEST: Diminished throughout.  No wheezes   HEART: Regular rate and rhythm, S1S2 auscultated. No murmurs  ABDMN: Soft. Non-tender, non-distended. No organomegaly. No guarding or rigidity. Bowel sounds present   EXTRM: trace pedal edema, DP pulses 2+.  NEURO: No tremors.  Following commands.  Slowed mentation  PSYCH: flat affect and mood.   INTGM: No skin rash, no cyanosis or clubbing    PERTINENT LABS/IMAGING:  Results for orders placed or performed during the hospital encounter of 01/08/22   CT Chest  Pulmonary Embolism w Contrast    Impression    IMPRESSION:  1.  Moderate bilateral pulmonary infiltrates consistent with COVID 19 pneumonitis.  2.  No pulmonary embolus.  3.  Coronary artery disease.   XR Chest Port 1 View    Impression    IMPRESSION: Mild to moderate patchy bilateral multilobar airspace disease, most pronounced within the right upper lobe and medial lung bases and mildly progressed since the CT from 01/08/2022. No definite pleural effusion. Stable heart size.   XR Chest Port 1 View    Impression    IMPRESSION: ET tube in good position with tip 2.4 cm above soy. NG tube stents in the stomach. Left PICC line in good position with tip low SVC.  Patchy mixed interstitial and groundglass infiltrates present bilaterally consistent with pneumonia. Borderline cardiomegaly.   XR Chest 1 View    Impression    IMPRESSION: Low endotracheal tube terminates at 1.7 cm from the soy, consider 3 to 4 cm retraction. Enteric tube terminates below diaphragm and field of view. Left PICC line with tip over SVC.    Improved aeration with mild residual right basilar opacity. No pneumothorax. Cardiomediastinal silhouette within normal limits.     Most Recent 3 CBC's:Recent Labs   Lab Test 01/18/22  0625 01/17/22  0508 01/16/22  0534   WBC 6.6 6.5 6.5   HGB 7.6* 8.1* 7.8*   * 114* 115*    180 207       No results for input(s): CHOL, HDL, LDL, TRIG, CHOLHDLRATIO in the last 21456 hours.  No results for input(s): LDL in the last 84706 hours.  Recent Labs   Lab Test 01/18/22  0905 01/18/22  0625 01/17/22  0800 01/17/22  0508   NA  --   --   --  138   POTASSIUM  --  3.7  --  4.5   CHLORIDE  --   --   --  109*   CO2  --   --   --  21*   GLC 96  --    < > 273*   BUN  --   --   --  23*   CR  --   --   --  0.95   GFRESTIMATED  --   --   --  66   OSWALDO  --   --   --  7.5*    < > = values in this interval not displayed.     Recent Labs   Lab Test 01/08/22  1612 05/21/19  0315   A1C 4.7 4.5     Recent Labs   Lab Test  01/18/22  0625 01/17/22  0508 01/16/22  0534   HGB 7.6* 8.1* 7.8*     Recent Labs   Lab Test 01/08/22  1612 05/21/19  1451 05/21/19  0731   TROPONINI 0.24 <0.01 0.01     Recent Labs   Lab Test 01/08/22  1612 05/20/19  2202   BNP 30 65     No results for input(s): TSH in the last 02099 hours.  Recent Labs   Lab Test 01/08/22  1612 05/20/19  2217   INR 1.09 0.91       Darlene Sheets DO  [facility] Medicine Service  [phone]

## 2022-01-18 NOTE — PROGRESS NOTES
1230 patient unavailable for Duoneb tx, having a meal and wanted to finish, Neb tx NG, will attempt again later.

## 2022-01-18 NOTE — PROGRESS NOTES
Pt is transferred to P2 at 0110 am with in stable condition. VSS. Afebrile. Pt remains on 5L of O2 via NC and has been sating 97% most of the shift.  Given nurse to nurse report. Removed salinas cath and art line. Applied pressure dressing. Informed the receiving nurse to check site for bleeding, hematoma or pain and change the dressing with 4x4 guaze and secure with a tape.

## 2022-01-18 NOTE — PROGRESS NOTES
"Speech-Language Pathology: Clinical Swallow Evaluation     01/18/22 1200   General Information   Onset of Illness/Injury or Date of Surgery 01/08/21   Referring Physician Sudeep   Pertinent History of Current Problem Per ordering MD \"Geovanna Huff is a 66 year old female with a history of diabetes, chronic tobacco abuse and alcohol abuse admitted to the hospital with acute respiratory failure and Covid infection\".   General Observations Patient was intubated from 1/14-1/17. RN reports no s/s aspiration with clear liquid diet.   Past History of Dysphagia N/A   Type of Evaluation   Type of Evaluation Swallow Evaluation   Oral Motor   Oral Musculature generally intact   Dentition (Oral Motor)   Dentition (Oral Motor) adequate dentition   Facial Symmetry (Oral Motor)   Facial Symmetry (Oral Motor) WNL   Lip Function (Oral Motor)   Lip Range of Motion (Oral Motor) WNL   Tongue Function (Oral Motor)   Tongue ROM (Oral Motor) WNL   Jaw Function (Oral Motor)   Jaw Function (Oral Motor) WNL   Vocal Quality/Secretion Management (Oral Motor)   Vocal Quality (Oral Motor) WNL   Secretion Management (Oral Motor) WNL   General Swallowing Observations   Current Diet/Method of Nutritional Intake (General Swallowing Observations, NIS) clear liquid diet   Respiratory Support (General Swallowing Observations) nasal cannula;other (see comments)  (5L)   Swallowing Evaluation Clinical swallow evaluation   Clinical Swallow Evaluation   Feeding Assistance frequent cues/help required  (Assist d/t UE weakness)   Clinical Swallow Evaluation Textures Trialed thin liquids;pureed;solid foods   Clinical Swallow Eval: Thin Liquid Texture Trial   Mode of Presentation, Thin Liquids straw;fed by clinician   Volume of Liquid or Food Presented 4oz   Oral Phase of Swallow WFL   Pharyngeal Phase of Swallow intact   Clinical Swallow Evaluation: Puree Solid Texture Trial   Mode of Presentation, Puree spoon;fed by clinician   Volume of Puree Presented " 4oz   Oral Phase, Puree WFL   Pharyngeal Phase, Puree intact   Clinical Swallow Evaluation: Solid Food Texture Trial   Mode of Presentation self-fed   Volume Presented x2 crackers   Oral Phase WFL   Pharyngeal Phase intact   Esophageal Phase of Swallow   Patient reports or presents with symptoms of esophageal dysphagia No   Swallowing Recommendations   Diet Consistency Recommendations thin liquids (level 0);regular diet   Supervision Level for Intake 1:1 supervision needed   Mode of Delivery Recommendations bolus size, small;slow rate of intake   Recommended Feeding/Eating Techniques (Swallow Eval) maintain upright sitting position for eating   Medication Administration Recommendations, Swallowing (SLP) Via thin or puree, pt preference   Comment, Swallowing Recommendations Patient swallow is WNL. No sx's aspiration.   General Therapy Interventions   Planned Therapy Interventions Dysphagia Treatment   Dysphagia treatment Instruction of safe swallow strategies   Intervention Comments Completed   SLP Therapy Assessment/Plan   Criteria for Skilled Therapeutic Interventions Met (SLP Eval) no problems identified which require skilled intervention   Therapy Frequency (SLP Eval) one time eval and treatment only   SLP Discharge Planning    SLP Rationale for DC Rec No ongoing ST anticipated.   SLP Brief overview of current status  Patient is okay for diet of regular/thin. 1:1 assist will be needed d/t UE weakness. She had no s/s aspiration or oral dysphagia.     Total Evaluation Time   Total Evaluation Time (Minutes) 10

## 2022-01-18 NOTE — PROGRESS NOTES
RCAT Treatment Plan    Patient Score: 16  Patient Acuity: 2    Clinical Indication for Therapy: rhonchi on auscultation    Therapy Ordered: Duoneb QID, Albuterol neb Q6 PRN.    Assessment Summary: COVID PNA with hypoxia, hx. Of COPD with acute exacerbation. BS coarse bilat, with scattered E/wheezes. Current smoker 2 ppd. ETOH abuse. CXR 1/15 Improved aeration, Rt. Basilar opacity. Will continue with current therapy, re-evaluate 01/21.     Rah Montgomery, RT  1/18/2022

## 2022-01-18 NOTE — PROGRESS NOTES
Intensivist update    Informed by charge nurse of need for ICU bed for a critically ill patient on P1    Pt doing well from respiratory standpoint post-extubation  On 5Lpm nc, SpO2 mid 90s    Hypertensive to 170s SBP -> resuming po anti-HTN's and has IV hydralazine prn available.    Remove arterial line + salinas catheter.  OK to transfer out of ICU.   Our service will continue to follow along on pulmonary consult service for COPD management.    Updated Dr. Strong ([facility] who will assume care for the night once out of ICU.    Andrew (Josh) MD Partha  [facility] Pulmonary & Critical Care  Clinic [phone]  Fax [phone]

## 2022-01-18 NOTE — PROGRESS NOTES
O2 5 lpm/NC, SpO2 93 %. BS coarse bilat with diffuse I/E wheezes, Duoneb tx given via face mask, tolerated well. BS improved after. RT to monitor, titrate O2 as tolerated.

## 2022-01-19 ENCOUNTER — APPOINTMENT (OUTPATIENT)
Dept: OCCUPATIONAL THERAPY | Facility: HOSPITAL | Age: 67
DRG: 208 | End: 2022-01-19
Payer: COMMERCIAL

## 2022-01-19 ENCOUNTER — APPOINTMENT (OUTPATIENT)
Dept: PHYSICAL THERAPY | Facility: HOSPITAL | Age: 67
DRG: 208 | End: 2022-01-19
Payer: COMMERCIAL

## 2022-01-19 LAB
ERYTHROCYTE [DISTWIDTH] IN BLOOD BY AUTOMATED COUNT: 14.6 % (ref 10–15)
GLUCOSE BLDC GLUCOMTR-MCNC: 103 MG/DL (ref 70–99)
GLUCOSE BLDC GLUCOMTR-MCNC: 115 MG/DL (ref 70–99)
GLUCOSE BLDC GLUCOMTR-MCNC: 166 MG/DL (ref 70–99)
GLUCOSE BLDC GLUCOMTR-MCNC: 168 MG/DL (ref 70–99)
GLUCOSE BLDC GLUCOMTR-MCNC: 69 MG/DL (ref 70–99)
HCT VFR BLD AUTO: 23.6 % (ref 35–47)
HGB BLD-MCNC: 7.5 G/DL (ref 11.7–15.7)
MCH RBC QN AUTO: 35.7 PG (ref 26.5–33)
MCHC RBC AUTO-ENTMCNC: 31.8 G/DL (ref 31.5–36.5)
MCV RBC AUTO: 112 FL (ref 78–100)
PLATELET # BLD AUTO: 179 10E3/UL (ref 150–450)
POTASSIUM BLD-SCNC: 3.8 MMOL/L (ref 3.5–5)
RBC # BLD AUTO: 2.1 10E6/UL (ref 3.8–5.2)
WBC # BLD AUTO: 4.9 10E3/UL (ref 4–11)

## 2022-01-19 PROCEDURE — 84132 ASSAY OF SERUM POTASSIUM: CPT | Performed by: INTERNAL MEDICINE

## 2022-01-19 PROCEDURE — 250N000013 HC RX MED GY IP 250 OP 250 PS 637: Performed by: INTERNAL MEDICINE

## 2022-01-19 PROCEDURE — 85027 COMPLETE CBC AUTOMATED: CPT | Performed by: FAMILY MEDICINE

## 2022-01-19 PROCEDURE — 250N000011 HC RX IP 250 OP 636: Performed by: INTERNAL MEDICINE

## 2022-01-19 PROCEDURE — 94640 AIRWAY INHALATION TREATMENT: CPT | Mod: 76

## 2022-01-19 PROCEDURE — 97530 THERAPEUTIC ACTIVITIES: CPT | Mod: GO

## 2022-01-19 PROCEDURE — 999N000157 HC STATISTIC RCP TIME EA 10 MIN

## 2022-01-19 PROCEDURE — 99232 SBSQ HOSP IP/OBS MODERATE 35: CPT | Performed by: INTERNAL MEDICINE

## 2022-01-19 PROCEDURE — 97110 THERAPEUTIC EXERCISES: CPT | Mod: GP

## 2022-01-19 PROCEDURE — 97530 THERAPEUTIC ACTIVITIES: CPT | Mod: GP

## 2022-01-19 PROCEDURE — 250N000012 HC RX MED GY IP 250 OP 636 PS 637: Performed by: INTERNAL MEDICINE

## 2022-01-19 PROCEDURE — 120N000001 HC R&B MED SURG/OB

## 2022-01-19 PROCEDURE — 99233 SBSQ HOSP IP/OBS HIGH 50: CPT | Performed by: INTERNAL MEDICINE

## 2022-01-19 PROCEDURE — C9113 INJ PANTOPRAZOLE SODIUM, VIA: HCPCS | Performed by: INTERNAL MEDICINE

## 2022-01-19 PROCEDURE — 250N000013 HC RX MED GY IP 250 OP 250 PS 637: Performed by: FAMILY MEDICINE

## 2022-01-19 PROCEDURE — 97535 SELF CARE MNGMENT TRAINING: CPT | Mod: GO

## 2022-01-19 PROCEDURE — 250N000009 HC RX 250: Performed by: INTERNAL MEDICINE

## 2022-01-19 RX ORDER — POLYETHYLENE GLYCOL 3350 17 G/17G
17 POWDER, FOR SOLUTION ORAL DAILY
Status: DISCONTINUED | OUTPATIENT
Start: 2022-01-19 | End: 2022-01-28 | Stop reason: HOSPADM

## 2022-01-19 RX ORDER — POLYETHYLENE GLYCOL 3350 17 G/17G
17 POWDER, FOR SOLUTION ORAL DAILY
Status: DISCONTINUED | OUTPATIENT
Start: 2022-01-19 | End: 2022-01-19

## 2022-01-19 RX ORDER — POTASSIUM CHLORIDE 1500 MG/1
20 TABLET, EXTENDED RELEASE ORAL ONCE
Status: COMPLETED | OUTPATIENT
Start: 2022-01-19 | End: 2022-01-19

## 2022-01-19 RX ADMIN — ASPIRIN 81 MG CHEWABLE TABLET 81 MG: 81 TABLET CHEWABLE at 09:04

## 2022-01-19 RX ADMIN — HYDRALAZINE HYDROCHLORIDE 10 MG: 20 INJECTION INTRAMUSCULAR; INTRAVENOUS at 00:24

## 2022-01-19 RX ADMIN — SERTRALINE HYDROCHLORIDE 150 MG: 50 TABLET ORAL at 09:04

## 2022-01-19 RX ADMIN — ENOXAPARIN SODIUM 40 MG: 40 INJECTION SUBCUTANEOUS at 20:52

## 2022-01-19 RX ADMIN — ENOXAPARIN SODIUM 40 MG: 40 INJECTION SUBCUTANEOUS at 09:05

## 2022-01-19 RX ADMIN — MAGNESIUM OXIDE TAB 400 MG (241.3 MG ELEMENTAL MG) 400 MG: 400 (241.3 MG) TAB at 20:52

## 2022-01-19 RX ADMIN — IPRATROPIUM BROMIDE AND ALBUTEROL SULFATE 3 ML: 2.5; .5 SOLUTION RESPIRATORY (INHALATION) at 16:22

## 2022-01-19 RX ADMIN — AMLODIPINE BESYLATE 10 MG: 5 TABLET ORAL at 09:05

## 2022-01-19 RX ADMIN — IPRATROPIUM BROMIDE AND ALBUTEROL SULFATE 3 ML: 2.5; .5 SOLUTION RESPIRATORY (INHALATION) at 11:15

## 2022-01-19 RX ADMIN — POLYETHYLENE GLYCOL 3350 17 G: 17 POWDER, FOR SOLUTION ORAL at 17:30

## 2022-01-19 RX ADMIN — IPRATROPIUM BROMIDE AND ALBUTEROL SULFATE 3 ML: 2.5; .5 SOLUTION RESPIRATORY (INHALATION) at 07:22

## 2022-01-19 RX ADMIN — FOLIC ACID 1 MG: 1 TABLET ORAL at 09:04

## 2022-01-19 RX ADMIN — PREDNISONE 40 MG: 20 TABLET ORAL at 09:03

## 2022-01-19 RX ADMIN — ATENOLOL 50 MG: 25 TABLET ORAL at 09:05

## 2022-01-19 RX ADMIN — CEFTRIAXONE SODIUM 2 G: 2 INJECTION, POWDER, FOR SOLUTION INTRAMUSCULAR; INTRAVENOUS at 17:47

## 2022-01-19 RX ADMIN — CLONIDINE HYDROCHLORIDE 0.2 MG: 0.1 TABLET ORAL at 20:51

## 2022-01-19 RX ADMIN — PANTOPRAZOLE SODIUM 40 MG: 40 INJECTION, POWDER, FOR SOLUTION INTRAVENOUS at 09:05

## 2022-01-19 RX ADMIN — Medication 1 TABLET: at 09:04

## 2022-01-19 RX ADMIN — CLONIDINE HYDROCHLORIDE 0.2 MG: 0.1 TABLET ORAL at 09:03

## 2022-01-19 RX ADMIN — POTASSIUM CHLORIDE 20 MEQ: 20 TABLET, EXTENDED RELEASE ORAL at 09:04

## 2022-01-19 RX ADMIN — IPRATROPIUM BROMIDE AND ALBUTEROL SULFATE 3 ML: 2.5; .5 SOLUTION RESPIRATORY (INHALATION) at 13:48

## 2022-01-19 RX ADMIN — IPRATROPIUM BROMIDE AND ALBUTEROL SULFATE 3 ML: 2.5; .5 SOLUTION RESPIRATORY (INHALATION) at 19:39

## 2022-01-19 RX ADMIN — SIMVASTATIN 40 MG: 40 TABLET, FILM COATED ORAL at 20:52

## 2022-01-19 RX ADMIN — MAGNESIUM OXIDE TAB 400 MG (241.3 MG ELEMENTAL MG) 400 MG: 400 (241.3 MG) TAB at 09:04

## 2022-01-19 RX ADMIN — OLANZAPINE 15 MG: 10 TABLET, FILM COATED ORAL at 20:51

## 2022-01-19 ASSESSMENT — ACTIVITIES OF DAILY LIVING (ADL)
ADLS_ACUITY_SCORE: 19
ADLS_ACUITY_SCORE: 20
ADLS_ACUITY_SCORE: 19
ADLS_ACUITY_SCORE: 20
ADLS_ACUITY_SCORE: 21
ADLS_ACUITY_SCORE: 21
ADLS_ACUITY_SCORE: 20
ADLS_ACUITY_SCORE: 19
ADLS_ACUITY_SCORE: 21
ADLS_ACUITY_SCORE: 20
ADLS_ACUITY_SCORE: 21

## 2022-01-19 NOTE — PLAN OF CARE
Problem: Gas Exchange Impaired  Goal: Optimal Gas Exchange  Outcome: No Change     Problem: Adult Inpatient Plan of Care  Goal: Optimal Comfort and Wellbeing  Outcome: Improving   Patient was de-sating on 02 @ 3L, now on 4L, sats has been in the in the upper 90's, remains on telemetry, read NSR, was transferred to the commode x2, is a heavy transfer of 2, refused bedpan, now has a purewick, blood sugars were 103 and 123, alert and oriented, significant other admitted on P3, called and spoke to him, alert and oriented, denies pain or discomfort.

## 2022-01-19 NOTE — PROGRESS NOTES
[facility]    PROGRESS NOTE - Hospitalist Service    ASSESSMENT AND PLAN     Active Problems:    Cough    Shortness of breath    Hypoxia    Acute kidney injury (H)    Elevated d-dimer    Infection due to 2019 novel coronavirus    Nonspecific elevation of levels of transaminase and lactic acid dehydrogenase (LDH)    Geovanna Huff is a 66 year old female with a history of diabetes, chronic tobacco abuse and alcohol abuse admitted to the hospital with acute respiratory failure and Covid infection     Acute hypoxic respiratory failure secondary to COVID-19 viral pneumonia/ARDS              Was transferred to the ICU on 1/10.  Intubated from 1/14-1/17              Symptom onset 12/28-unclear vaccination status.  Patient states she thinks she got J and J              Chest CT with contrast showed Covid pneumonia, no PE              Inhalers four times a day and PRN              Dexamethasone 6 mg x 10 days initiated 1/8/2022 and Remdesivir x 5 days Initiated on 1/8/2022              Lovenox 40mg daily   Will be covid recovered on 1/28/22     Acute exacerbation COPD              Was initiated on Decadron 1/8 through 1/14.  Has also been receiving Solu-Medrol starting 1/14.  Solu-Medrol discontinued 1/18.  Prednisone taper will be initiated 1/19.              Ceftriaxone initiated 1/14 for CAP- final dose 1/19     Acute metabolic encephalopathy secondary to recent extubation and ICU stay              Speech recommending regular diet      Lactic acidosis               Resolved with IV fluids      Hypokalemia              Replace per protocol     ROSINA- resolved with IV fluids      Macrocytic anemia              Has remained stable this admission.                No signs of acute bleed              suspect related to chronic alcohol abuse.       Alcohol dependence               Admits to drinking heavily rum and Cokes but states that she quit drinking 2 weeks PTA      Schizoaffective  disorder              Home Zyprexa, Zoloft     Hypertension              Home amlodipine and atenolol and clonidine with hold parameters              Hydralazine as needed     DM-II              Poor oral intake              Decrease Lantus to 20 units at bedtime.  Home dosing is 15 units at bedtime with metformin               Hold metformin              Diabetes order set used.  A1C of 4.7      Hyperlipidemia-              LFTs mildly up- hold home Zocor for now     GERD-              Previous ulcer association with her alcohol abuse.  Continue PPI     Tobacco abuse-              Nicotine patch     Severe protein calorie malnutrition      DVT Prophylaxis: Enoxaparin (Lovenox) SQ     Barriers to discharge: placement, Hypoxia     Anticipated length of stay: medically stable for discharge when bed available    Subjective:  Patient is more oriented today.  Knows she is in the hospital because she had COVID-pneumonia.  More alert today.  No complaints.    PHYSICAL EXAM  Temp:  [97.8  F (36.6  C)-98.7  F (37.1  C)] 98.4  F (36.9  C)  Pulse:  [64-95] 95  Resp:  [18-20] 20  BP: (121-186)/(60-89) 121/60  FiO2 (%):  [32 %] 32 %  SpO2:  [91 %-98 %] 95 %  Wt Readings from Last 1 Encounters:   01/14/22 69.4 kg (153 lb)       Intake/Output Summary (Last 24 hours) at 1/19/2022 1337  Last data filed at 1/19/2022 0814  Gross per 24 hour   Intake 360 ml   Output 900 ml   Net -540 ml      Body mass index is 27.98 kg/m .    GENRL:  Alert and answering questions appropriately.  Knows it is January 2022 and that she is at [facility].  No respiratory distress.  Propped up in bed   HEENT: no lymphadenopathy or thyromegaly  CHEST: Diminished throughout.  No wheezes   HEART: Regular rate and rhythm, S1S2 auscultated. No murmurs  ABDMN: Soft. Non-tender, non-distended. No organomegaly. No guarding or rigidity. Bowel sounds present   EXTRM: trace pedal edema, DP pulses 2+.  NEURO: No tremors.  Following commands.   Slowed mentation  PSYCH: flat affect and mood.   INTGM: No skin rash, no cyanosis or clubbing    PERTINENT LABS/IMAGING:  Results for orders placed or performed during the hospital encounter of 01/08/22   CT Chest Pulmonary Embolism w Contrast    Impression    IMPRESSION:  1.  Moderate bilateral pulmonary infiltrates consistent with COVID 19 pneumonitis.  2.  No pulmonary embolus.  3.  Coronary artery disease.   XR Chest Port 1 View    Impression    IMPRESSION: Mild to moderate patchy bilateral multilobar airspace disease, most pronounced within the right upper lobe and medial lung bases and mildly progressed since the CT from 01/08/2022. No definite pleural effusion. Stable heart size.   XR Chest Port 1 View    Impression    IMPRESSION: ET tube in good position with tip 2.4 cm above soy. NG tube stents in the stomach. Left PICC line in good position with tip low SVC.  Patchy mixed interstitial and groundglass infiltrates present bilaterally consistent with pneumonia. Borderline cardiomegaly.   XR Chest 1 View    Impression    IMPRESSION: Low endotracheal tube terminates at 1.7 cm from the soy, consider 3 to 4 cm retraction. Enteric tube terminates below diaphragm and field of view. Left PICC line with tip over SVC.    Improved aeration with mild residual right basilar opacity. No pneumothorax. Cardiomediastinal silhouette within normal limits.     Most Recent 3 CBC's:Recent Labs   Lab Test 01/19/22  0640 01/18/22  0625 01/17/22  0508   WBC 4.9 6.6 6.5   HGB 7.5* 7.6* 8.1*   * 114* 114*    173 180       No results for input(s): CHOL, HDL, LDL, TRIG, CHOLHDLRATIO in the last 91013 hours.  No results for input(s): LDL in the last 94436 hours.  Recent Labs   Lab Test 01/19/22  1140 01/19/22  0812 01/19/22  0640 01/17/22  0800 01/17/22  0508   NA  --   --   --   --  138   POTASSIUM  --   --  3.8   < > 4.5   CHLORIDE  --   --   --   --  109*   CO2  --   --   --   --  21*   *   < >  --    < >  273*   BUN  --   --   --   --  23*   CR  --   --   --   --  0.95   GFRESTIMATED  --   --   --   --  66   OSWALDO  --   --   --   --  7.5*    < > = values in this interval not displayed.     Recent Labs   Lab Test 01/08/22 1612 05/21/19  0315   A1C 4.7 4.5     Recent Labs   Lab Test 01/19/22  0640 01/18/22  0625 01/17/22  0508   HGB 7.5* 7.6* 8.1*     Recent Labs   Lab Test 01/08/22 1612 05/21/19  1451 05/21/19  0731   TROPONINI 0.24 <0.01 0.01     Recent Labs   Lab Test 01/08/22 1612 05/20/19  2202   BNP 30 65     No results for input(s): TSH in the last 68146 hours.  Recent Labs   Lab Test 01/08/22 1612 05/20/19  2217   INR 1.09 0.91       Darlene Sheets DO  [facility] Medicine Service  [phone]

## 2022-01-19 NOTE — PLAN OF CARE
Neuro: Patient confused at times.    Respiratory: Right lung sounds diminished. Expiratory wheezes on the left improving to diminished. Oxygen saturations vary greatly, 91-98% on 5L. Reduced to 3L. Saturations best while sleeping.    GI: Last charted bowel movement 1/10/22. Positive bowel sounds and soft abdomen.    : Incontinent of urine. Purewick in place.    Mobility: Patient turns side to side independently in bed. Per report, she is a jose transfer.        Update: Moderate assist x1 per therapy. Patient up in chair.

## 2022-01-19 NOTE — PLAN OF CARE
Patient somnolent overnight. Satting 90-94%; weaned to RA. Pressure dressing to RUE removed. Tele discontinued. BP elevated; Hydralazine given IV x1 dose.     Problem: Gas Exchange Impaired  Goal: Optimal Gas Exchange  Outcome: Improving

## 2022-01-19 NOTE — PROGRESS NOTES
PT GETTING DUO NEB TX'S QID. PT ON 4-5 L NC AT THIS TIME. TOLERATED TX WELL. RT TO CONTINUE TO FOLLOW.    Roman Lew, RT on 1/18/2022 at 10:06 PM

## 2022-01-19 NOTE — PROGRESS NOTES
"PULMONARY MEDICINE PROGRESS NOTE  1/19/2022      Admit Date: 1/8/2022  CODE: Full Code    Reason for Consult: acute respiratory failure with hypoxia, COVID-19 infection, tobacco dependence    Assessment/Plan:   66 year old female with a history of DM, tobacco dependence, alcohol dependence, COVID-19 infection diagnosed on 12/29, now with acute hypoxemic respiratory failure and ARDS, intubated on 1/14, extubated on 1/17.    Acute hypoxemic respiratory failure, COVID-19 infection, tobacco dependence: Patient has poor fund of knowledge and poor insight into her medical condition, unless she has a degree of persistent encephalopathy. She states quite honestly that she \"doesn't know\" whether she has been vaccinated against COVID-19, \"don't know where I would have gotten it.\" Lives with boyfriend who smokes. Wants to quit, has nicotine patch. No prior PFTs of which she is aware. No obvious emphysema on chest CT; had GGOs c/w COVID-19 infection. Clinically improving.    Plan:  - smoking cessation education  - standard COVID-19 therapies  - prednisone taper ordered  - scheduled nebulized ipratropium-albuterol while inpatient  - should have close follow-up with PCP regarding tobacco dependence and possible pulmonary function testing  - titrate oxygen to SpO2 89-92%; assess for home oxygen on discharge  - should be vaccinated against COVID-19 following recovery  - pulmonary consult service will sign off but please call any time if needed    Jesus Manuel Laguerre MD  Pulmonary and Critical Care Medicine  [facility]  Office [phone]  Pager [phone]  (he/him/his)                                                                                                                                                        SUBJECTIVE/INTERVAL HISTORY   Denies dyspnea. Able to state year and where she is. States she smoked 1.5-2 ppd prior to admission, live-in boyfriend smokes. Patient wants to quit. States she \"doesn't " "know\" whether she has been vaccinated against COVID-19; when I asked her to clarify this, states, \"I don't know where I would have gotten it.\" She seems to be sincere when making this statement. No prior PFTs of which she is aware.                                                                                                                                                      Exam/Data:     Vitals  /60 (BP Location: Right arm, Patient Position: Semi-Jenkins's)   Pulse 95   Temp 98.4  F (36.9  C) (Oral)   Resp 20   Ht 1.575 m (5' 2\")   Wt 69.4 kg (153 lb)   SpO2 95%   BMI 27.98 kg/m       I/O last 3 completed shifts:  In: 360 [P.O.:360]  Out: 900 [Urine:900]  Weight change:   [unfilled]  EXAM:  /60 (BP Location: Right arm, Patient Position: Semi-Jenkins's)   Pulse 95   Temp 98.4  F (36.9  C) (Oral)   Resp 20   Ht 1.575 m (5' 2\")   Wt 69.4 kg (153 lb)   SpO2 95%   BMI 27.98 kg/m      Intake/Output last 3 shifts:  I/O last 3 completed shifts:  In: 360 [P.O.:360]  Out: 900 [Urine:900]  Intake/Output this shift:  No intake/output data recorded.    Physical Exam  Gen: alert, oriented, no distress  HEENT: NT, no YAEL  CV: tachycardic, no m/g/r  Resp: moderately diminished bilaterally  Abd: soft, nontender, BS+  Skin: no rashes or lesions  Ext: no edema  Neuro: PERRL, nonfocal exam    ROS: A complete 10-system review of systems was obtained and is negative with the exception of what is noted in the history of present illness.    Medications:       dextrose       - MEDICATION INSTRUCTIONS -         amLODIPine  10 mg Oral Daily     aspirin  81 mg Oral or NG Tube Daily     atenolol  50 mg Oral Daily     cefTRIAXone  2 g Intravenous Q24H     cloNIDine  0.2 mg Oral BID     enoxaparin ANTICOAGULANT  40 mg Subcutaneous Q12H     folic acid  1 mg Oral Daily     insulin aspart  1-7 Units Subcutaneous TID AC     insulin aspart  1-5 Units Subcutaneous At Bedtime     insulin aspart   Subcutaneous TID w/meals "     insulin glargine  20 Units Subcutaneous At Bedtime     ipratropium - albuterol 0.5 mg/2.5 mg/3 mL  3 mL Nebulization 4x daily     magnesium oxide  400 mg Oral BID     multivitamin w/minerals  1 tablet Oral or Feeding Tube Daily     OLANZapine  15 mg Oral or Feeding Tube At Bedtime     pantoprazole (PROTONIX) IV  40 mg Intravenous Daily with breakfast     polyethylene glycol  17 g Oral Daily     predniSONE  40 mg Oral Daily    Followed by     [START ON 1/22/2022] predniSONE  30 mg Oral Daily    Followed by     [START ON 1/25/2022] predniSONE  20 mg Oral Daily    Followed by     [START ON 1/28/2022] predniSONE  10 mg Oral Daily     sertraline  150 mg Oral or Feeding Tube Daily     simvastatin  40 mg Oral or Feeding Tube At Bedtime     sodium chloride (PF)  10-40 mL Intracatheter Q7 Days     sodium chloride (PF)  3 mL Intracatheter Q8H         DATA  All laboratory and radiology has been personally reviewed by myself today.  Recent Labs   Lab 01/19/22  0640   WBC 4.9   HGB 7.5*   HCT 23.6*        Recent Labs   Lab 01/17/22  0508 01/16/22  0534 01/15/22  0339    137 139   CO2 21* 22 20*   BUN 23* 23* 20       IMAGING:   Chest CTA (1/8/22):  - images directly reviewed, formal interpretation follows:  FINDINGS:  ANGIOGRAM CHEST: No pulmonary embolus. No aortic dissection or aneurysm.     LUNGS AND PLEURA: Moderate patchy groundglass infiltrate throughout both lungs worst in the right upper lobe. Mild bibasilar atelectasis. No pneumothorax.     MEDIASTINUM/AXILLAE: Small hiatal hernia.     CORONARY ARTERY CALCIFICATION: Moderate.     UPPER ABDOMEN: Normal.     MUSCULOSKELETAL: Degenerative changes of the spine.                                                                      IMPRESSION:  1.  Moderate bilateral pulmonary infiltrates consistent with COVID 19 pneumonitis.  2.  No pulmonary embolus.  3.  Coronary artery disease.    CXR (1/16/22):  - images directly reviewed, formal interpretation  follows:  IMPRESSION: Low endotracheal tube terminates at 1.7 cm from the soy, consider 3 to 4 cm retraction. Enteric tube terminates below diaphragm and field of view. Left PICC line with tip over SVC.     Improved aeration with mild residual right basilar opacity. No pneumothorax. Cardiomediastinal silhouette within normal limits.

## 2022-01-19 NOTE — PROGRESS NOTES
RESPIRATORY CARE NOTE   Patient is on 3.5 lpm N/C, BS no stethoscope available, gave Duoneb treatment x1, BS post treatment no stethoscope available, patient perceives mild improvement, patient tolerated well.     Shamar Pelletier, RT

## 2022-01-19 NOTE — PROVIDER NOTIFICATION
Notified MD at 0038 regarding notification/clarification needed on the following:.      1. Patient's /88; order to notify if SBP >180.  2. Patient's PRN Hydralazine order is a ranged order with no indication of what dose to give. Order is 10-20mg; 10mg given IV due to patient's oral BP meds restarted today.  3. Patient had order in ICU for continuous cardiac monitoring, but no tele order active. Does patient need Tele?  4. Patient has pressure dressing in place to RUE from Art line removal on 1/17; Ok to remove? Any limb restrictions to that extremity?    Spoke with: Dr. Rudolph Strong    Orders were obtained as listed below:    1. NNO  2. NNO, 10mg sufficient at this time.  3. No Tele necessary, may discontinue.  4. Remove pressure dressing, no restrictions to limb.

## 2022-01-20 ENCOUNTER — APPOINTMENT (OUTPATIENT)
Dept: OCCUPATIONAL THERAPY | Facility: HOSPITAL | Age: 67
DRG: 208 | End: 2022-01-20
Payer: COMMERCIAL

## 2022-01-20 LAB
GLUCOSE BLDC GLUCOMTR-MCNC: 123 MG/DL (ref 70–99)
GLUCOSE BLDC GLUCOMTR-MCNC: 174 MG/DL (ref 70–99)
GLUCOSE BLDC GLUCOMTR-MCNC: 229 MG/DL (ref 70–99)
GLUCOSE BLDC GLUCOMTR-MCNC: 83 MG/DL (ref 70–99)
MAGNESIUM SERPL-MCNC: 1.5 MG/DL (ref 1.8–2.6)
POTASSIUM BLD-SCNC: 3.8 MMOL/L (ref 3.5–5)

## 2022-01-20 PROCEDURE — 120N000001 HC R&B MED SURG/OB

## 2022-01-20 PROCEDURE — 250N000009 HC RX 250: Performed by: INTERNAL MEDICINE

## 2022-01-20 PROCEDURE — 250N000013 HC RX MED GY IP 250 OP 250 PS 637: Performed by: INTERNAL MEDICINE

## 2022-01-20 PROCEDURE — 97110 THERAPEUTIC EXERCISES: CPT | Mod: GO

## 2022-01-20 PROCEDURE — 83735 ASSAY OF MAGNESIUM: CPT | Performed by: INTERNAL MEDICINE

## 2022-01-20 PROCEDURE — 97535 SELF CARE MNGMENT TRAINING: CPT | Mod: GO

## 2022-01-20 PROCEDURE — 250N000012 HC RX MED GY IP 250 OP 636 PS 637: Performed by: INTERNAL MEDICINE

## 2022-01-20 PROCEDURE — 84132 ASSAY OF SERUM POTASSIUM: CPT | Performed by: INTERNAL MEDICINE

## 2022-01-20 PROCEDURE — 250N000013 HC RX MED GY IP 250 OP 250 PS 637: Performed by: HOSPITALIST

## 2022-01-20 PROCEDURE — 94640 AIRWAY INHALATION TREATMENT: CPT | Mod: 76

## 2022-01-20 PROCEDURE — 94640 AIRWAY INHALATION TREATMENT: CPT

## 2022-01-20 PROCEDURE — 250N000011 HC RX IP 250 OP 636: Performed by: INTERNAL MEDICINE

## 2022-01-20 PROCEDURE — 999N000157 HC STATISTIC RCP TIME EA 10 MIN

## 2022-01-20 PROCEDURE — C9113 INJ PANTOPRAZOLE SODIUM, VIA: HCPCS | Performed by: INTERNAL MEDICINE

## 2022-01-20 PROCEDURE — 250N000013 HC RX MED GY IP 250 OP 250 PS 637: Performed by: FAMILY MEDICINE

## 2022-01-20 PROCEDURE — 99232 SBSQ HOSP IP/OBS MODERATE 35: CPT | Performed by: HOSPITALIST

## 2022-01-20 RX ORDER — POTASSIUM CHLORIDE 1500 MG/1
20 TABLET, EXTENDED RELEASE ORAL ONCE
Status: COMPLETED | OUTPATIENT
Start: 2022-01-20 | End: 2022-01-20

## 2022-01-20 RX ORDER — POLYETHYLENE GLYCOL 3350 17 G/17G
17 POWDER, FOR SOLUTION ORAL DAILY PRN
Status: DISCONTINUED | OUTPATIENT
Start: 2022-01-20 | End: 2022-01-28 | Stop reason: HOSPADM

## 2022-01-20 RX ORDER — MAGNESIUM OXIDE 400 MG/1
400 TABLET ORAL 3 TIMES DAILY
Status: COMPLETED | OUTPATIENT
Start: 2022-01-20 | End: 2022-01-21

## 2022-01-20 RX ORDER — AMOXICILLIN 250 MG
1 CAPSULE ORAL DAILY PRN
Status: DISCONTINUED | OUTPATIENT
Start: 2022-01-20 | End: 2022-01-28 | Stop reason: HOSPADM

## 2022-01-20 RX ADMIN — IPRATROPIUM BROMIDE AND ALBUTEROL SULFATE 3 ML: 2.5; .5 SOLUTION RESPIRATORY (INHALATION) at 19:09

## 2022-01-20 RX ADMIN — IPRATROPIUM BROMIDE AND ALBUTEROL SULFATE 3 ML: 2.5; .5 SOLUTION RESPIRATORY (INHALATION) at 08:11

## 2022-01-20 RX ADMIN — CLONIDINE HYDROCHLORIDE 0.2 MG: 0.1 TABLET ORAL at 08:45

## 2022-01-20 RX ADMIN — IPRATROPIUM BROMIDE AND ALBUTEROL SULFATE 3 ML: 2.5; .5 SOLUTION RESPIRATORY (INHALATION) at 15:45

## 2022-01-20 RX ADMIN — Medication 1 TABLET: at 08:45

## 2022-01-20 RX ADMIN — SIMVASTATIN 40 MG: 40 TABLET, FILM COATED ORAL at 21:22

## 2022-01-20 RX ADMIN — IPRATROPIUM BROMIDE AND ALBUTEROL SULFATE 3 ML: 2.5; .5 SOLUTION RESPIRATORY (INHALATION) at 11:44

## 2022-01-20 RX ADMIN — PREDNISONE 40 MG: 20 TABLET ORAL at 08:44

## 2022-01-20 RX ADMIN — FOLIC ACID 1 MG: 1 TABLET ORAL at 08:46

## 2022-01-20 RX ADMIN — ENOXAPARIN SODIUM 40 MG: 40 INJECTION SUBCUTANEOUS at 21:19

## 2022-01-20 RX ADMIN — ENOXAPARIN SODIUM 40 MG: 40 INJECTION SUBCUTANEOUS at 08:46

## 2022-01-20 RX ADMIN — ATENOLOL 50 MG: 25 TABLET ORAL at 08:45

## 2022-01-20 RX ADMIN — AMLODIPINE BESYLATE 10 MG: 5 TABLET ORAL at 08:44

## 2022-01-20 RX ADMIN — POLYETHYLENE GLYCOL 3350 17 G: 17 POWDER, FOR SOLUTION ORAL at 08:50

## 2022-01-20 RX ADMIN — CLONIDINE HYDROCHLORIDE 0.2 MG: 0.1 TABLET ORAL at 21:20

## 2022-01-20 RX ADMIN — INSULIN GLARGINE 15 UNITS: 100 INJECTION, SOLUTION SUBCUTANEOUS at 21:25

## 2022-01-20 RX ADMIN — MAGNESIUM OXIDE TAB 400 MG (241.3 MG ELEMENTAL MG) 400 MG: 400 (241.3 MG) TAB at 08:49

## 2022-01-20 RX ADMIN — ASPIRIN 81 MG CHEWABLE TABLET 81 MG: 81 TABLET CHEWABLE at 08:46

## 2022-01-20 RX ADMIN — PANTOPRAZOLE SODIUM 40 MG: 40 INJECTION, POWDER, FOR SOLUTION INTRAVENOUS at 08:46

## 2022-01-20 RX ADMIN — MAGNESIUM OXIDE TAB 400 MG (241.3 MG ELEMENTAL MG) 400 MG: 400 (241.3 MG) TAB at 13:44

## 2022-01-20 RX ADMIN — MAGNESIUM OXIDE TAB 400 MG (241.3 MG ELEMENTAL MG) 400 MG: 400 (241.3 MG) TAB at 21:21

## 2022-01-20 RX ADMIN — SERTRALINE HYDROCHLORIDE 150 MG: 50 TABLET ORAL at 08:44

## 2022-01-20 RX ADMIN — POTASSIUM CHLORIDE 20 MEQ: 20 TABLET, EXTENDED RELEASE ORAL at 08:43

## 2022-01-20 RX ADMIN — OLANZAPINE 15 MG: 10 TABLET, FILM COATED ORAL at 21:22

## 2022-01-20 ASSESSMENT — ACTIVITIES OF DAILY LIVING (ADL)
ADLS_ACUITY_SCORE: 14
ADLS_ACUITY_SCORE: 21
ADLS_ACUITY_SCORE: 15
ADLS_ACUITY_SCORE: 21
ADLS_ACUITY_SCORE: 14
ADLS_ACUITY_SCORE: 15
ADLS_ACUITY_SCORE: 21
ADLS_ACUITY_SCORE: 14
ADLS_ACUITY_SCORE: 14
ADLS_ACUITY_SCORE: 21
ADLS_ACUITY_SCORE: 15
ADLS_ACUITY_SCORE: 21
ADLS_ACUITY_SCORE: 21
ADLS_ACUITY_SCORE: 14
ADLS_ACUITY_SCORE: 21
ADLS_ACUITY_SCORE: 15
ADLS_ACUITY_SCORE: 21

## 2022-01-20 NOTE — PROGRESS NOTES
OT/PT to reassess for discharge needs. 1/19/22 recommendations TCU; home with therapy; 24 hours assistance. Care Management to follow.

## 2022-01-20 NOTE — PLAN OF CARE
O2 titrated up and down throughout night. Patient's O2 needs variable - occasionally does well on RA satting 94-96% and other times needs 2-3L O2 to maintain sats >89% per order. Very slight cough, non-productive. NAEON.    Problem: Gas Exchange Impaired  Goal: Optimal Gas Exchange  Outcome: No Change  Intervention: Optimize Oxygenation and Ventilation

## 2022-01-20 NOTE — PLAN OF CARE
Pt denied pain this shift.  Pt sat up in chair for several hours this morning and tolerated well.  O2 sats were primarily above 90% on 2L but this afternoon she kept dipping to 87-88% on 2L so she was bumped up to 3L. O2 sats 91% on 3L. RT following.  Pt thought she would have a BM this morning. She sat on the BSC for 15 minutes and was able to pass flatus but no BM. Pt will need additional stool softeners this evening. Mg and K+ protocols. Mg level was 1.5. Pt received 2 PO doses of mag oxide this shift. K+ 3.8. Pt received 1 dose of 20meq KCL. Re-check tomorrow am.

## 2022-01-20 NOTE — PROGRESS NOTES
RESPIRATORY CARE NOTE   Patient is on 2 LPM NC, BS wheezes expiratory, gave Duoneb treatment x3, BS post treatment clear, patient perceives moderate improvement, patient tolerated treatment well.     Keon Torres, RT

## 2022-01-20 NOTE — PROGRESS NOTES
Hospitalist Progress Note    Assessment/Plan  \  Active Problems:    Cough    Shortness of breath    Hypoxia    Acute kidney injury (H)    Elevated d-dimer    Infection due to 2019 novel coronavirus    Nonspecific elevation of levels of transaminase and lactic acid dehydrogenase (LDH)    66-year-old patient with history of diabetes, chronic tobacco abuse, alcohol abuse, was admitted to the hospital for acute respiratory failure with hypoxia secondary to COVID-19 infection    Acute respiratory failure with hypoxia secondary to COVID-19 infection ARDS,  Was intubated on January 14 through 17,  Unclear vaccination status she states that she had J&J vaccine  Symptoms started on December 28,  Received remdesivir and dexamethasone,  Oxygen requirement improving initially was on room air then required 2 to 3 L oxygen, will continue to monitor and wean as tolerated  Anticipate discharge  To home with home care versus TCU  We will consider COVID recovered on January 28      Acute COPD exacerbation  Was initiated on Decadron January 8 through 14, she received Solu-Medrol started January 14 through 18,  Also was treated for community-acquired pneumonia with ceftriaxone finish the treatment course  Pulmonary saw her and started on Medrol taper on January 19      Acute metabolic encephalopathy  Resolving currently on a regular diet      Schizoaffective disorder  On Zyprexa and Zoloft, mood is stable,      Microcytic anemia  No signs of active bleeding, suspect due to alcohol abuse       Lactic acidosis, ROSINA, hypokalemia  Resolved with IV fluids and replacement      Hypertension  On amlodipine atenolol and clonidine  Along with as needed hydralazine      Type 2 diabetes  Lantus dose to 20 units at bedtime home dose of metformin being held,      GERD  Previous ulceration with alcohol abuse, on PPI      Tobacco abuse  On nicotine patch      Alcohol dependence  Admitted on drinking heavily rum and coke but states that she quit  drinking 2 weeks prior to admission          Barriers to Discharge: Placement    Anticipated discharge date/Disposition: Home versus TCU    Subjective  Patient was seen this morning, she has no breathing issues, was breathing comfortable on room air,    Objective    Vital signs in last 24 hours  Temp:  [97.8  F (36.6  C)-98.3  F (36.8  C)] 98.3  F (36.8  C)  Pulse:  [71-77] 71  Resp:  [16-20] 16  BP: (121-157)/(60-80) 157/80  SpO2:  [87 %-97 %] 91 % [unfilled] O2 Device: Nasal cannula    Weight:   [unfilled] Weight change:     Intake/Output last 3 shifts  I/O last 3 completed shifts:  In: 600 [P.O.:600]  Out: 850 [Urine:850]  Body mass index is 27.98 kg/m .    Physical Exam:  General Appearance: Alert, oriented x3, does not appear in distress.  HEENT: Normocephalic. No scleral icterus, . Mucous membranes moist.  Heart: :Regular rate and rhythm, normal S1 ,S2, No murmurs, no JVD, no pedal edema   Lungs: Clear to auscultation bilaterally. No wheezing or crackles  Abdomen: Soft, non tender, no rebound or rigidity, non distended, bowel sounds present.      Pertinent Labs   Lab Results: personally reviewed.   Recent Labs   Lab 01/17/22  0508 01/16/22  0534 01/15/22  0339    137 139   CO2 21* 22 20*   BUN 23* 23* 20     Recent Labs   Lab 01/19/22  0640 01/18/22  0625 01/17/22  0508   WBC 4.9 6.6 6.5   HGB 7.5* 7.6* 8.1*   HCT 23.6* 23.8* 24.9*    173 180     No results for input(s): CKTOTAL, TROPONINI in the last 168 hours.    Invalid input(s): TROPONINT, CKMBINDEX  Invalid input(s): POCGLUFGR        Advanced Care Planning:  Discharge Planning discussed with patient  Total time with this patient is 25 min with 50% of time spent in examining the patient, reviewing records, discussing plan of care and counseling, 50% of time spent in coordination of care.  Care discussed and coordinated with RN, care manager.      Annetta Stover MD  Internal Medicine Hospitalist  1/20/2022

## 2022-01-20 NOTE — PROGRESS NOTES
Patient is seen for a neb treatment. Distress not noted; O2 titrated to 2 lpm, and pt is sating mid 90s. BS coarse/expiratory wheezing. Deep breath and coughing encouraged. RT following.    Manoj Hernandez, RT

## 2022-01-21 ENCOUNTER — APPOINTMENT (OUTPATIENT)
Dept: OCCUPATIONAL THERAPY | Facility: HOSPITAL | Age: 67
DRG: 208 | End: 2022-01-21
Payer: COMMERCIAL

## 2022-01-21 ENCOUNTER — APPOINTMENT (OUTPATIENT)
Dept: PHYSICAL THERAPY | Facility: HOSPITAL | Age: 67
DRG: 208 | End: 2022-01-21
Payer: COMMERCIAL

## 2022-01-21 LAB
GLUCOSE BLDC GLUCOMTR-MCNC: 139 MG/DL (ref 70–99)
GLUCOSE BLDC GLUCOMTR-MCNC: 147 MG/DL (ref 70–99)
GLUCOSE BLDC GLUCOMTR-MCNC: 150 MG/DL (ref 70–99)
GLUCOSE BLDC GLUCOMTR-MCNC: 78 MG/DL (ref 70–99)
POTASSIUM BLD-SCNC: 4 MMOL/L (ref 3.5–5)

## 2022-01-21 PROCEDURE — 250N000013 HC RX MED GY IP 250 OP 250 PS 637: Performed by: HOSPITALIST

## 2022-01-21 PROCEDURE — 250N000013 HC RX MED GY IP 250 OP 250 PS 637: Performed by: INTERNAL MEDICINE

## 2022-01-21 PROCEDURE — 250N000009 HC RX 250: Performed by: INTERNAL MEDICINE

## 2022-01-21 PROCEDURE — 84132 ASSAY OF SERUM POTASSIUM: CPT | Performed by: HOSPITALIST

## 2022-01-21 PROCEDURE — 120N000001 HC R&B MED SURG/OB

## 2022-01-21 PROCEDURE — C9113 INJ PANTOPRAZOLE SODIUM, VIA: HCPCS | Performed by: INTERNAL MEDICINE

## 2022-01-21 PROCEDURE — 99231 SBSQ HOSP IP/OBS SF/LOW 25: CPT | Performed by: HOSPITALIST

## 2022-01-21 PROCEDURE — 97535 SELF CARE MNGMENT TRAINING: CPT | Mod: GO

## 2022-01-21 PROCEDURE — 97110 THERAPEUTIC EXERCISES: CPT | Mod: GO

## 2022-01-21 PROCEDURE — 250N000012 HC RX MED GY IP 250 OP 636 PS 637: Performed by: INTERNAL MEDICINE

## 2022-01-21 PROCEDURE — 250N000013 HC RX MED GY IP 250 OP 250 PS 637: Performed by: FAMILY MEDICINE

## 2022-01-21 PROCEDURE — 97116 GAIT TRAINING THERAPY: CPT | Mod: GP

## 2022-01-21 PROCEDURE — 999N000157 HC STATISTIC RCP TIME EA 10 MIN

## 2022-01-21 PROCEDURE — 94640 AIRWAY INHALATION TREATMENT: CPT

## 2022-01-21 PROCEDURE — 250N000011 HC RX IP 250 OP 636: Performed by: INTERNAL MEDICINE

## 2022-01-21 PROCEDURE — 94640 AIRWAY INHALATION TREATMENT: CPT | Mod: 76

## 2022-01-21 RX ORDER — PANTOPRAZOLE SODIUM 40 MG/1
40 TABLET, DELAYED RELEASE ORAL
Status: DISCONTINUED | OUTPATIENT
Start: 2022-01-22 | End: 2022-01-28 | Stop reason: HOSPADM

## 2022-01-21 RX ADMIN — CLONIDINE HYDROCHLORIDE 0.2 MG: 0.1 TABLET ORAL at 20:36

## 2022-01-21 RX ADMIN — ATENOLOL 50 MG: 25 TABLET ORAL at 08:39

## 2022-01-21 RX ADMIN — ENOXAPARIN SODIUM 40 MG: 40 INJECTION SUBCUTANEOUS at 08:38

## 2022-01-21 RX ADMIN — PREDNISONE 40 MG: 20 TABLET ORAL at 08:39

## 2022-01-21 RX ADMIN — IPRATROPIUM BROMIDE AND ALBUTEROL SULFATE 3 ML: 2.5; .5 SOLUTION RESPIRATORY (INHALATION) at 11:51

## 2022-01-21 RX ADMIN — SERTRALINE HYDROCHLORIDE 150 MG: 50 TABLET ORAL at 08:39

## 2022-01-21 RX ADMIN — AMLODIPINE BESYLATE 10 MG: 5 TABLET ORAL at 08:38

## 2022-01-21 RX ADMIN — FOLIC ACID 1 MG: 1 TABLET ORAL at 08:39

## 2022-01-21 RX ADMIN — MAGNESIUM OXIDE TAB 400 MG (241.3 MG ELEMENTAL MG) 400 MG: 400 (241.3 MG) TAB at 08:40

## 2022-01-21 RX ADMIN — PANTOPRAZOLE SODIUM 40 MG: 40 INJECTION, POWDER, FOR SOLUTION INTRAVENOUS at 08:38

## 2022-01-21 RX ADMIN — MAGNESIUM OXIDE TAB 400 MG (241.3 MG ELEMENTAL MG) 400 MG: 400 (241.3 MG) TAB at 20:39

## 2022-01-21 RX ADMIN — POLYETHYLENE GLYCOL 3350 17 G: 17 POWDER, FOR SOLUTION ORAL at 08:37

## 2022-01-21 RX ADMIN — IPRATROPIUM BROMIDE AND ALBUTEROL SULFATE 3 ML: 2.5; .5 SOLUTION RESPIRATORY (INHALATION) at 15:41

## 2022-01-21 RX ADMIN — SIMVASTATIN 40 MG: 40 TABLET, FILM COATED ORAL at 20:37

## 2022-01-21 RX ADMIN — OLANZAPINE 15 MG: 10 TABLET, FILM COATED ORAL at 20:37

## 2022-01-21 RX ADMIN — IPRATROPIUM BROMIDE AND ALBUTEROL SULFATE 3 ML: 2.5; .5 SOLUTION RESPIRATORY (INHALATION) at 07:16

## 2022-01-21 RX ADMIN — ENOXAPARIN SODIUM 40 MG: 40 INJECTION SUBCUTANEOUS at 20:29

## 2022-01-21 RX ADMIN — CLONIDINE HYDROCHLORIDE 0.2 MG: 0.1 TABLET ORAL at 08:39

## 2022-01-21 RX ADMIN — IPRATROPIUM BROMIDE AND ALBUTEROL SULFATE 3 ML: 2.5; .5 SOLUTION RESPIRATORY (INHALATION) at 19:21

## 2022-01-21 RX ADMIN — MAGNESIUM OXIDE TAB 400 MG (241.3 MG ELEMENTAL MG) 400 MG: 400 (241.3 MG) TAB at 14:03

## 2022-01-21 RX ADMIN — Medication 1 TABLET: at 08:38

## 2022-01-21 RX ADMIN — ASPIRIN 81 MG CHEWABLE TABLET 81 MG: 81 TABLET CHEWABLE at 08:39

## 2022-01-21 RX ADMIN — INSULIN GLARGINE 15 UNITS: 100 INJECTION, SOLUTION SUBCUTANEOUS at 20:42

## 2022-01-21 ASSESSMENT — ACTIVITIES OF DAILY LIVING (ADL)
ADLS_ACUITY_SCORE: 14
ADLS_ACUITY_SCORE: 12
ADLS_ACUITY_SCORE: 12
DEPENDENT_IADLS:: INDEPENDENT
ADLS_ACUITY_SCORE: 12
ADLS_ACUITY_SCORE: 14
ADLS_ACUITY_SCORE: 14
ADLS_ACUITY_SCORE: 12
ADLS_ACUITY_SCORE: 14
ADLS_ACUITY_SCORE: 12
ADLS_ACUITY_SCORE: 14
ADLS_ACUITY_SCORE: 14
ADLS_ACUITY_SCORE: 12
ADLS_ACUITY_SCORE: 12
ADLS_ACUITY_SCORE: 14
ADLS_ACUITY_SCORE: 12
ADLS_ACUITY_SCORE: 14
ADLS_ACUITY_SCORE: 14
ADLS_ACUITY_SCORE: 12
ADLS_ACUITY_SCORE: 14
ADLS_ACUITY_SCORE: 12

## 2022-01-21 NOTE — PROGRESS NOTES
Patient has been assessed for Home Oxygen needs.     Pulse oximetry (SpO2) and Oxygen flow readings:    SpO2 = 98% on room air at rest while awake.    SpO2 improved to  % on   liters/minute at rest.    SpO2 = 97% on room air during activity/with exercise.    *SpO2 improved to  % on   liters/minute during activity/with exercise.

## 2022-01-21 NOTE — PROGRESS NOTES
"RESPIRATORY CARE NOTE  Patient is on Room Air, BS clear; diminished, gave Duoneb treatment x3, BS post treatment aeration increased, patient perceives moderate improvement, patient tolerated treatment well.    /58 (BP Location: Right arm, Patient Position: Sitting)   Pulse 84   Temp 98.6  F (37  C) (Oral)   Resp 20   Ht 1.575 m (5' 2\")   Wt 69.4 kg (153 lb)   SpO2 98%   BMI 27.98 kg/m            Keon Torres, RT      "

## 2022-01-21 NOTE — PLAN OF CARE
Pt had a quiet shift, on 3 L/NC and was satting at 92-93% at the start and through the evening hours. At 2207, RT did neb treatment and turned off the 02. Pt has been satting at 90-91% on room air, writer will continue to monitor and initiate 02 therapy again if needed. BG= 229 and 174 mg/dL, no c/o pain.

## 2022-01-21 NOTE — PROGRESS NOTES
Care Management Follow Up    Length of Stay (days): 13    Expected Discharge Date: 01/24/2022     Concerns to be Addressed:       Patient plan of care discussed at interdisciplinary rounds: Yes    Anticipated Discharge Disposition:       Anticipated Discharge Services:    Anticipated Discharge DME:      Patient/family educated on Medicare website which has current facility and service quality ratings:    Education Provided on the Discharge Plan:    Patient/Family in Agreement with the Plan:      Referrals Placed by CM/SW:    Private pay costs discussed: Not applicable    Additional Information:  See below       Ashley Llanos RN          Asked therapy to let me know how their session goes today. [place] [facility] can likely accept if patient has made enough progress to go home . Awaiting therapy session     3:30 PM  Received message from Lauren MON that she is still recommending TCU . Attempted to call patient in her room, she was busy with another staff member     3:42 PM  Spoke to patient. She is agreeable to TCU. Agrees to referrals in the area. Will need to be covid recovered. Referrals sent.

## 2022-01-21 NOTE — PROGRESS NOTES
Hospitalist Progress Note    Assessment/Plan    Active Problems:    Cough    Shortness of breath    Hypoxia    Acute kidney injury (H)    Elevated d-dimer    Infection due to 2019 novel coronavirus    Nonspecific elevation of levels of transaminase and lactic acid dehydrogenase (LDH)    66-year-old patient with history of diabetes, chronic tobacco abuse, alcohol abuse, was admitted to the hospital for acute respiratory failure with hypoxia secondary to COVID-19 infection     Acute respiratory failure with hypoxia secondary to COVID-19 infection ARDS,  Was intubated on January 14 through 17,  Unclear vaccination status she states that she had J&J vaccine  Symptoms started on December 28,  Received remdesivir and dexamethasone,  Oxygen requirement improving continue to be on room air  Continue to to be on isolation through January 28  Home O2 eval done today with no need for oxygen        Acute COPD exacerbation  Was initiated on Decadron January 8 through 14, she received Solu-Medrol started January 14 through 18,  Also was treated for community-acquired pneumonia with ceftriaxone finish the treatment course  Pulmonary saw her and started on Medrol taper on January 19        Acute metabolic encephalopathy  Resolved        Schizoaffective disorder  On Zyprexa and Zoloft, mood is stable,        Microcytic anemia  No signs of active bleeding, suspect due to alcohol abuse        Lactic acidosis, ROSINA, hypokalemia  Resolved with IV fluids and replacement        Hypertension  On amlodipine atenolol and clonidine  Along with as needed hydralazine        Type 2 diabetes  Lantus dose to 20 units at bedtime home dose of metformin being held,        GERD  Previous ulceration with alcohol abuse, on PPI        Tobacco abuse  On nicotine patch        Alcohol dependence  Admitted on drinking heavily rum and coke but states that she quit drinking 2 weeks prior to admission       Barriers to Discharge: Placement    Anticipated  discharge date/Disposition: TCU versus home with home care in 1 to 2 days    Subjective  Patient was seen today, she appears comfortable, she had a bowel movement this morning, had home oxygen evaluation and did not qualify for oxygen,    Objective    Vital signs in last 24 hours  Temp:  [98.2  F (36.8  C)-99.4  F (37.4  C)] 98.6  F (37  C)  Pulse:  [73-97] 84  Resp:  [16-20] 20  BP: (103-132)/(54-72) 125/58  SpO2:  [90 %-98 %] 98 % [unfilled] O2 Device: None (Room air)    Weight:   [unfilled] Weight change:     Intake/Output last 3 shifts  I/O last 3 completed shifts:  In: 720 [P.O.:720]  Out: 750 [Urine:750]  Body mass index is 27.98 kg/m .    Physical Exam:  General Appearance: Alert, oriented x3, does not appear in distress.  HEENT: Normocephalic. No scleral icterus, . Mucous membranes moist.  Heart: :Regular rate and rhythm, normal S1 ,S2, No murmurs, no JVD, no pedal edema   Lungs: Clear to auscultation bilaterally. No wheezing or crackles  Abdomen: Soft, non tender, no rebound or rigidity, non distended, bowel sounds present.      Pertinent Labs   Lab Results: personally reviewed.   Recent Labs   Lab 01/17/22  0508 01/16/22  0534 01/15/22  0339    137 139   CO2 21* 22 20*   BUN 23* 23* 20     Recent Labs   Lab 01/19/22  0640 01/18/22  0625 01/17/22  0508   WBC 4.9 6.6 6.5   HGB 7.5* 7.6* 8.1*   HCT 23.6* 23.8* 24.9*    173 180     No results for input(s): CKTOTAL, TROPONINI in the last 168 hours.    Invalid input(s): TROPONINT, CKMBINDEX  Invalid input(s): POCGLUFGR        Advanced Care Planning:  Discharge Planning discussed with patient  Total time with this patient is 15 min with 50% of time spent in examining the patient, reviewing records, discussing plan of care and counseling, 50% of time spent in coordination of care.  Care discussed and coordinated with RN, care management .      Annetta Stover MD  Internal Medicine Hospitalist  1/21/2022

## 2022-01-21 NOTE — CONSULTS
Care Management Initial Consult    General Information  Assessment completed with: Patient, pt  Type of CM/SW Visit: Initial Assessment    Primary Care Provider verified and updated as needed: Yes   Readmission within the last 30 days:     Reason for Consult: discharge planning  Advance Care Planning: Advance Care Planning Reviewed: verified with patient        Communication Assessment  Patient's communication style: spoken language (English or Bilingual)    Hearing Difficulty or Deaf: no   Wear Glasses or Blind: no    Cognitive  Cognitive/Neuro/Behavioral: WDL  Level of Consciousness: alert  Arousal Level: arouses to voice  Orientation: oriented x 4  Mood/Behavior: calm,cooperative  Best Language: 0 - No aphasia  Speech: fluent    Living Environment:   People in home: significant other  Rah  Current living Arrangements: house      Able to return to prior arrangements:         Family/Social Support:  Care provided by: self  Provides care for: no one  Marital Status: Single  Significant Other       Rah  Description of Support System: Supportive,Involved    Support Assessment: Adequate family and caregiver support    Current Resources:   Patient receiving home care services: No     Community Resources: None  Equipment currently used at home: grab bar, tub/shower,glucometer  Supplies currently used at home: Diabetic Supplies    Employment/Financial:  Employment Status: disabled        Financial Concerns: No concerns identified   Referral to Financial Counselor: No     Lifestyle & Psychosocial Needs:  Social Determinants of Health     Tobacco Use: High Risk     Smoking Tobacco Use: Current Every Day Smoker     Smokeless Tobacco Use: Never Used   Alcohol Use: Not on file   Financial Resource Strain: Not on file   Food Insecurity: Not on file   Transportation Needs: Not on file   Physical Activity: Not on file   Stress: Not on file   Social Connections: Not on file   Intimate Partner Violence: Not on file    Depression: Not on file   Housing Stability: Not on file     Functional Status:  Prior to admission patient needed assistance:   Dependent ADLs:: Independent  Dependent IADLs:: Independent    Mental Health Status:  Mental Health Status: No Current Concerns       Chemical Dependency Status:    Values/Beliefs:  Spiritual, Cultural Beliefs, Christianity Practices, Values that affect care:               Additional Information:  Call placed to pt, due to Covid guidelines, to review role of care management, progression of care and possible need for services at discharge, including OP services, home care, or skilled nursing care.   Pt states she resides in a single family house with her boyfriend, Rah. She states she is on Disability but is independent with all ADLs. She states she has ability to drive, but Rah usually does all the driving. She ambulates independently without any assistive devices, but has a walker and cane available.    Discussed recommendation for TCU. Pt declines referral to [facility] as only Covid accepting TCU. She is agreeable to TCU referrals at [facility] and [facility] (once Covid recovered).  Her preference is to discharge home with significant other with Home Care services.  Home Care referrals sent and are pending.    Hailey Marino RN

## 2022-01-21 NOTE — PROGRESS NOTES
CLINICAL NUTRITION SERVICES - REASSESSMENT NOTE      Recommendations Ordered by Registered Dietitian (RD): Continue diet as ordered. Added Glucerna 1x/day   Malnutrition: % Weight Loss:  > 2% in 1 week (severe malnutrition)  % Intake:  </= 50% for >/= 5 days (severe malnutrition)  Subcutaneous Fat Loss:  unable  Muscle Loss:  unable  Fluid Retention:  None noted per chart  Malnutrition Diagnosis: Severe malnutrition  In Context of:  Acute illness or injury       EVALUATION OF PROGRESS TOWARD GOALS   Diet: 75g consistent carb    Nutrition Support:  Pt was on TF while intubated. Diet now changed to PO 75g consistent carb    Intake/Tolerance:  Eating 25-75% of meals per flowsheet, ordering meals 3x /day.  Pt reports good appetite and eating 100% of meals.       ASSESSED NUTRITION NEEDS:  Dosing Weight: 50 kg  Estimated Energy Needs: 4202-1296 kcals/day (25 - 30 kcals/kg)  Justification: critically ill and Overweight  Estimated Protein Needs: 60-75 grams protein/day (1.2 - 1.5 grams of pro/kg)  Justification: Increased needs  Estimated Fluid Needs: 1500+ mL/day (1 mL/kcal)   Justification: Maintenance      NEW FINDINGS:   Started TF 1/14, went back to PO 1/18     Previous Goals:   Patient to consume % of nutritionally adequate meals three times per day, or the equivalent with supplements/snacks.  Maintain wt  Evaluation: Not met    Previous Nutrition Diagnosis:   Inadequate oral intake  Malnutrition related to acute illness as evidenced by eating 0-50% at meals since admission.    Evaluation: Improving      CURRENT NUTRITION DIAGNOSIS  Inadequate oral intake related to acute illness as evidenced by eating 25-75% of meals per flowsheet.    INTERVENTIONS  Recommendations / Nutrition Prescription  Continue diet as ordered  Added Glucerna 1x/day    Implementation  Medical Food Supplement    Goals  Patient to consume % of nutritionally adequate meals three times per day, or the equivalent with  supplements/snacks.  Maintain wt      MONITORING AND EVALUATION:  Progress towards goals will be monitored and evaluated per protocol and Practice Guidelines    Cheyanne Hills  Dietetic Intern

## 2022-01-21 NOTE — PLAN OF CARE
Problem: Adult Inpatient Plan of Care  Goal: Optimal Comfort and Wellbeing  Outcome: No Change   Pt is alert and oriented. Purewick in place for incontinence. Pt denies any pain or discomfort. Sats 92% on room air. Denies any shortness of breath. Uneventful shift.  Rossy Metcalf RN

## 2022-01-22 ENCOUNTER — APPOINTMENT (OUTPATIENT)
Dept: OCCUPATIONAL THERAPY | Facility: HOSPITAL | Age: 67
DRG: 208 | End: 2022-01-22
Payer: COMMERCIAL

## 2022-01-22 LAB
GLUCOSE BLDC GLUCOMTR-MCNC: 102 MG/DL (ref 70–99)
GLUCOSE BLDC GLUCOMTR-MCNC: 211 MG/DL (ref 70–99)
GLUCOSE BLDC GLUCOMTR-MCNC: 81 MG/DL (ref 70–99)
GLUCOSE BLDC GLUCOMTR-MCNC: 93 MG/DL (ref 70–99)
GLUCOSE BLDC GLUCOMTR-MCNC: 99 MG/DL (ref 70–99)
MAGNESIUM SERPL-MCNC: 1.7 MG/DL (ref 1.8–2.6)
POTASSIUM BLD-SCNC: 3.8 MMOL/L (ref 3.5–5)

## 2022-01-22 PROCEDURE — 250N000013 HC RX MED GY IP 250 OP 250 PS 637: Performed by: HOSPITALIST

## 2022-01-22 PROCEDURE — 99231 SBSQ HOSP IP/OBS SF/LOW 25: CPT | Performed by: HOSPITALIST

## 2022-01-22 PROCEDURE — 250N000011 HC RX IP 250 OP 636: Performed by: INTERNAL MEDICINE

## 2022-01-22 PROCEDURE — 84132 ASSAY OF SERUM POTASSIUM: CPT | Performed by: HOSPITALIST

## 2022-01-22 PROCEDURE — 250N000012 HC RX MED GY IP 250 OP 636 PS 637: Performed by: HOSPITALIST

## 2022-01-22 PROCEDURE — 250N000013 HC RX MED GY IP 250 OP 250 PS 637: Performed by: INTERNAL MEDICINE

## 2022-01-22 PROCEDURE — 250N000013 HC RX MED GY IP 250 OP 250 PS 637: Performed by: FAMILY MEDICINE

## 2022-01-22 PROCEDURE — 83735 ASSAY OF MAGNESIUM: CPT | Performed by: HOSPITALIST

## 2022-01-22 PROCEDURE — 94640 AIRWAY INHALATION TREATMENT: CPT | Mod: 76

## 2022-01-22 PROCEDURE — 97110 THERAPEUTIC EXERCISES: CPT | Mod: GO

## 2022-01-22 PROCEDURE — 120N000001 HC R&B MED SURG/OB

## 2022-01-22 PROCEDURE — 97535 SELF CARE MNGMENT TRAINING: CPT | Mod: GO

## 2022-01-22 PROCEDURE — 250N000012 HC RX MED GY IP 250 OP 636 PS 637: Performed by: INTERNAL MEDICINE

## 2022-01-22 PROCEDURE — 999N000157 HC STATISTIC RCP TIME EA 10 MIN

## 2022-01-22 PROCEDURE — 250N000009 HC RX 250: Performed by: INTERNAL MEDICINE

## 2022-01-22 RX ORDER — MAGNESIUM OXIDE 400 MG/1
400 TABLET ORAL 2 TIMES DAILY
Status: COMPLETED | OUTPATIENT
Start: 2022-01-22 | End: 2022-01-23

## 2022-01-22 RX ORDER — POTASSIUM CHLORIDE 1.5 G/1.58G
20 POWDER, FOR SOLUTION ORAL ONCE
Status: COMPLETED | OUTPATIENT
Start: 2022-01-22 | End: 2022-01-22

## 2022-01-22 RX ADMIN — PANTOPRAZOLE SODIUM 40 MG: 40 TABLET, DELAYED RELEASE ORAL at 10:32

## 2022-01-22 RX ADMIN — IPRATROPIUM BROMIDE AND ALBUTEROL SULFATE 3 ML: 2.5; .5 SOLUTION RESPIRATORY (INHALATION) at 07:45

## 2022-01-22 RX ADMIN — ENOXAPARIN SODIUM 40 MG: 40 INJECTION SUBCUTANEOUS at 21:01

## 2022-01-22 RX ADMIN — CLONIDINE HYDROCHLORIDE 0.2 MG: 0.1 TABLET ORAL at 10:06

## 2022-01-22 RX ADMIN — IPRATROPIUM BROMIDE AND ALBUTEROL SULFATE 3 ML: 2.5; .5 SOLUTION RESPIRATORY (INHALATION) at 12:29

## 2022-01-22 RX ADMIN — POLYETHYLENE GLYCOL 3350 17 G: 17 POWDER, FOR SOLUTION ORAL at 10:07

## 2022-01-22 RX ADMIN — MAGNESIUM OXIDE TAB 400 MG (241.3 MG ELEMENTAL MG) 400 MG: 400 (241.3 MG) TAB at 20:59

## 2022-01-22 RX ADMIN — OLANZAPINE 15 MG: 10 TABLET, FILM COATED ORAL at 20:58

## 2022-01-22 RX ADMIN — HYDRALAZINE HYDROCHLORIDE 10 MG: 20 INJECTION INTRAMUSCULAR; INTRAVENOUS at 00:06

## 2022-01-22 RX ADMIN — ENOXAPARIN SODIUM 40 MG: 40 INJECTION SUBCUTANEOUS at 10:08

## 2022-01-22 RX ADMIN — ATENOLOL 50 MG: 25 TABLET ORAL at 10:07

## 2022-01-22 RX ADMIN — Medication 1 TABLET: at 10:07

## 2022-01-22 RX ADMIN — IPRATROPIUM BROMIDE AND ALBUTEROL SULFATE 3 ML: 2.5; .5 SOLUTION RESPIRATORY (INHALATION) at 17:13

## 2022-01-22 RX ADMIN — SIMVASTATIN 40 MG: 40 TABLET, FILM COATED ORAL at 20:58

## 2022-01-22 RX ADMIN — AMLODIPINE BESYLATE 10 MG: 5 TABLET ORAL at 10:06

## 2022-01-22 RX ADMIN — INSULIN GLARGINE 15 UNITS: 100 INJECTION, SOLUTION SUBCUTANEOUS at 21:06

## 2022-01-22 RX ADMIN — FOLIC ACID 1 MG: 1 TABLET ORAL at 10:06

## 2022-01-22 RX ADMIN — PREDNISONE 30 MG: 5 TABLET ORAL at 10:05

## 2022-01-22 RX ADMIN — ASPIRIN 81 MG CHEWABLE TABLET 81 MG: 81 TABLET CHEWABLE at 10:06

## 2022-01-22 RX ADMIN — CLONIDINE HYDROCHLORIDE 0.2 MG: 0.1 TABLET ORAL at 21:01

## 2022-01-22 RX ADMIN — SERTRALINE HYDROCHLORIDE 150 MG: 50 TABLET ORAL at 10:07

## 2022-01-22 RX ADMIN — POTASSIUM CHLORIDE FOR ORAL SOLUTION 20 MEQ: 1.5 POWDER, FOR SOLUTION ORAL at 10:07

## 2022-01-22 RX ADMIN — MAGNESIUM OXIDE TAB 400 MG (241.3 MG ELEMENTAL MG) 400 MG: 400 (241.3 MG) TAB at 10:13

## 2022-01-22 RX ADMIN — IPRATROPIUM BROMIDE AND ALBUTEROL SULFATE 3 ML: 2.5; .5 SOLUTION RESPIRATORY (INHALATION) at 20:38

## 2022-01-22 ASSESSMENT — ACTIVITIES OF DAILY LIVING (ADL)
ADLS_ACUITY_SCORE: 12
ADLS_ACUITY_SCORE: 14
ADLS_ACUITY_SCORE: 12
ADLS_ACUITY_SCORE: 14
ADLS_ACUITY_SCORE: 12
ADLS_ACUITY_SCORE: 14
ADLS_ACUITY_SCORE: 12

## 2022-01-22 ASSESSMENT — MIFFLIN-ST. JEOR: SCORE: 1223.99

## 2022-01-22 NOTE — PLAN OF CARE
No prn medications needed/given.  Pt sat up in chair for several hours and tolerated well.  Pt on RA. Sats have been >90%.  Therapy following.

## 2022-01-22 NOTE — PROGRESS NOTES
RESPIRATORY CARE NOTE   Patient is on room air, BS QUOC, gave duoneb treatment x1, BS post treatment QUOC, patient perceives moderate improvement, patient tolerated well.     Kenneth H. Kjellberg

## 2022-01-22 NOTE — PROGRESS NOTES
Hospitalist Progress Note    Assessment/Plan    Active Problems:    Cough    Shortness of breath    Hypoxia    Acute kidney injury (H)    Elevated d-dimer    Infection due to 2019 novel coronavirus    Nonspecific elevation of levels of transaminase and lactic acid dehydrogenase (LDH)    66-year-old patient with history of diabetes, chronic tobacco abuse, alcohol abuse, was admitted to the hospital for acute respiratory failure with hypoxia secondary to COVID-19 infection     Acute respiratory failure with hypoxia secondary to COVID-19 infection ARDS,  Was intubated on January 14 through 17,  Unclear vaccination status she states that she had J&J vaccine  Symptoms started on December 28,  Received remdesivir and dexamethasone,  Oxygen requirement improving continue to be on room air  Continue to to be on isolation through January 28  She does not qualify for home oxygen continue to be stable overnight     Acute COPD exacerbation  Was initiated on Decadron January 8 through 14, she received Solu-Medrol started January 14 through 18,  Also was treated for community-acquired pneumonia with ceftriaxone finish the treatment course  Pulmonary saw her and started on Medrol taper on January 19 breathing comfortable,        Acute metabolic encephalopathy  Resolved        Schizoaffective disorder  On Zyprexa and Zoloft, mood is stable,        Microcytic anemia  No signs of active bleeding, suspect due to alcohol abuse        Lactic acidosis, ROSINA, hypokalemia  Resolved with IV fluids and replacement        Hypertension  On amlodipine atenolol and clonidine  Along with as needed hydralazine blood pressure controlled        Type 2 diabetes  Lantus dose to 20 units at bedtime home dose of metformin being held,        GERD  Previous ulceration with alcohol abuse, on PPI     Severe malnutrition  Secondary to acute illness, she was noted to have weight loss, dietitian is following,     Tobacco abuse  On nicotine patch        Alcohol  dependence  Admitted on drinking heavily rum and coke but states that she quit drinking 2 weeks prior to admission       Barriers to Discharge: Placement    Anticipated discharge date/Disposition: TCU when bed available    Subjective  Patient was seen today, she does not like that she is being in the hospital for 2 weeks and like to go home I explained to her that she is very weak and we are waiting for her to go to TCU when bed is available, she appears to be agreeable with the plan    Objective    Vital signs in last 24 hours  Temp:  [97.9  F (36.6  C)-99.2  F (37.3  C)] 98.5  F (36.9  C)  Pulse:  [57-78] 57  Resp:  [20] 20  BP: (117-173)/(60-82) 130/60  SpO2:  [88 %-97 %] 95 % [unfilled] O2 Device: Nasal cannula    Weight:   [unfilled] Weight change:     Intake/Output last 3 shifts  I/O last 3 completed shifts:  In: 720 [P.O.:720]  Out: 400 [Urine:400]  Body mass index is 29.47 kg/m .    Physical Exam:  General Appearance: Alert, oriented x3, does not appear in distress.  HEENT: Normocephalic. No scleral icterus, . Mucous membranes moist.  Heart: :Regular rate and rhythm, normal S1 ,S2, No murmurs, no JVD, no pedal edema   Lungs: Clear to auscultation bilaterally. No wheezing or crackles      Pertinent Labs   Lab Results: personally reviewed.   Recent Labs   Lab 01/17/22  0508 01/16/22  0534    137   CO2 21* 22   BUN 23* 23*     Recent Labs   Lab 01/19/22  0640 01/18/22  0625 01/17/22  0508   WBC 4.9 6.6 6.5   HGB 7.5* 7.6* 8.1*   HCT 23.6* 23.8* 24.9*    173 180     No results for input(s): CKTOTAL, TROPONINI in the last 168 hours.    Invalid input(s): TROPONINT, CKMBINDEX  Invalid input(s): POCGLUFGR          Advanced Care Planning:  Discharge Planning discussed with patient  Total time with this patient is 15 min with 50% of time spent in examining the patient, reviewing records, discussing plan of care and counseling, 50% of time spent in coordination of care.  Care discussed and coordinated  with RN, care manager.      Annetta Stover MD  Internal Medicine Hospitalist  1/22/2022

## 2022-01-22 NOTE — PROGRESS NOTES
SW received a VM form [facility] and Pt is accepted for services and SOC can happen on Monday for pt. Ph; [phone]. Fax orders when ready .     Vivi Jalloh LGSW

## 2022-01-22 NOTE — PLAN OF CARE
Problem: Gas Exchange Impaired  Goal: Optimal Gas Exchange  Outcome: No Change  Intervention: Optimize Oxygenation and Ventilation  Recent Flowsheet Documentation  Taken 1/22/2022 0000 by Kizzy Mcdonald RN  Head of Bed (HOB) Positioning: HOB at 20-30 degrees     Problem: Electrolyte Imbalance  Goal: Electrolyte Balance  Outcome: No Change     Pt denies pain on shift. Pt had BP of 173/82, administered prn hydralazine with results. Pt output 400 mL, pt had an incontinent episode while waiting for purewicks. Pt on 2L of O2. Pt BS 99. Pt Mg, 1.7, ran protocol. K+ 3.8, ran protocol. Pt slept through most of the night.     Kizzy Mcdonald, RN

## 2022-01-22 NOTE — PLAN OF CARE
Denies pain,  Eating well for meals.  and 150 this shift. Got carb count coverage only at supper meal.  IV Sl'd.  Up in the chair most of the shift.. Sating 95-96 on RA. Sats decreased tonight when patient was back in bed and laying supine. Put O2 back on at 2L/NC for over night. Continuous pulse ox in place.  Up with assist of one to transfer from chair to bed with gaitbelt and walker.PT/OT working with patient.

## 2022-01-23 ENCOUNTER — APPOINTMENT (OUTPATIENT)
Dept: ULTRASOUND IMAGING | Facility: HOSPITAL | Age: 67
DRG: 208 | End: 2022-01-23
Payer: COMMERCIAL

## 2022-01-23 LAB
GLUCOSE BLDC GLUCOMTR-MCNC: 122 MG/DL (ref 70–99)
GLUCOSE BLDC GLUCOMTR-MCNC: 80 MG/DL (ref 70–99)
GLUCOSE BLDC GLUCOMTR-MCNC: 87 MG/DL (ref 70–99)
GLUCOSE BLDC GLUCOMTR-MCNC: 95 MG/DL (ref 70–99)
HGB BLD-MCNC: 7.1 G/DL (ref 11.7–15.7)
POTASSIUM BLD-SCNC: 4 MMOL/L (ref 3.5–5)

## 2022-01-23 PROCEDURE — 250N000011 HC RX IP 250 OP 636: Performed by: INTERNAL MEDICINE

## 2022-01-23 PROCEDURE — 250N000013 HC RX MED GY IP 250 OP 250 PS 637: Performed by: INTERNAL MEDICINE

## 2022-01-23 PROCEDURE — 99233 SBSQ HOSP IP/OBS HIGH 50: CPT | Performed by: HOSPITALIST

## 2022-01-23 PROCEDURE — 250N000013 HC RX MED GY IP 250 OP 250 PS 637: Performed by: HOSPITALIST

## 2022-01-23 PROCEDURE — 120N000001 HC R&B MED SURG/OB

## 2022-01-23 PROCEDURE — 85018 HEMOGLOBIN: CPT | Performed by: HOSPITALIST

## 2022-01-23 PROCEDURE — 93971 EXTREMITY STUDY: CPT | Mod: RT

## 2022-01-23 PROCEDURE — 250N000009 HC RX 250: Performed by: INTERNAL MEDICINE

## 2022-01-23 PROCEDURE — 250N000013 HC RX MED GY IP 250 OP 250 PS 637: Performed by: FAMILY MEDICINE

## 2022-01-23 PROCEDURE — 84132 ASSAY OF SERUM POTASSIUM: CPT | Performed by: HOSPITALIST

## 2022-01-23 PROCEDURE — 250N000012 HC RX MED GY IP 250 OP 636 PS 637: Performed by: INTERNAL MEDICINE

## 2022-01-23 RX ADMIN — IPRATROPIUM BROMIDE AND ALBUTEROL 1 PUFF: 20; 100 SPRAY, METERED RESPIRATORY (INHALATION) at 12:41

## 2022-01-23 RX ADMIN — MAGNESIUM OXIDE TAB 400 MG (241.3 MG ELEMENTAL MG) 400 MG: 400 (241.3 MG) TAB at 08:27

## 2022-01-23 RX ADMIN — IPRATROPIUM BROMIDE AND ALBUTEROL 1 PUFF: 20; 100 SPRAY, METERED RESPIRATORY (INHALATION) at 18:10

## 2022-01-23 RX ADMIN — PREDNISONE 30 MG: 5 TABLET ORAL at 08:27

## 2022-01-23 RX ADMIN — MAGNESIUM OXIDE TAB 400 MG (241.3 MG ELEMENTAL MG) 400 MG: 400 (241.3 MG) TAB at 21:25

## 2022-01-23 RX ADMIN — AMLODIPINE BESYLATE 10 MG: 5 TABLET ORAL at 08:28

## 2022-01-23 RX ADMIN — OLANZAPINE 15 MG: 10 TABLET, FILM COATED ORAL at 21:23

## 2022-01-23 RX ADMIN — POLYETHYLENE GLYCOL 3350 17 G: 17 POWDER, FOR SOLUTION ORAL at 08:26

## 2022-01-23 RX ADMIN — INSULIN GLARGINE 15 UNITS: 100 INJECTION, SOLUTION SUBCUTANEOUS at 21:31

## 2022-01-23 RX ADMIN — Medication 1 TABLET: at 08:27

## 2022-01-23 RX ADMIN — PANTOPRAZOLE SODIUM 40 MG: 40 TABLET, DELAYED RELEASE ORAL at 08:27

## 2022-01-23 RX ADMIN — SIMVASTATIN 40 MG: 40 TABLET, FILM COATED ORAL at 21:23

## 2022-01-23 RX ADMIN — CLONIDINE HYDROCHLORIDE 0.2 MG: 0.1 TABLET ORAL at 08:26

## 2022-01-23 RX ADMIN — SERTRALINE HYDROCHLORIDE 150 MG: 50 TABLET ORAL at 08:27

## 2022-01-23 RX ADMIN — ASPIRIN 81 MG CHEWABLE TABLET 81 MG: 81 TABLET CHEWABLE at 08:27

## 2022-01-23 RX ADMIN — IPRATROPIUM BROMIDE AND ALBUTEROL 1 PUFF: 20; 100 SPRAY, METERED RESPIRATORY (INHALATION) at 08:26

## 2022-01-23 RX ADMIN — ATENOLOL 50 MG: 25 TABLET ORAL at 08:28

## 2022-01-23 RX ADMIN — FOLIC ACID 1 MG: 1 TABLET ORAL at 08:28

## 2022-01-23 RX ADMIN — ENOXAPARIN SODIUM 40 MG: 40 INJECTION SUBCUTANEOUS at 08:26

## 2022-01-23 RX ADMIN — IPRATROPIUM BROMIDE AND ALBUTEROL 1 PUFF: 20; 100 SPRAY, METERED RESPIRATORY (INHALATION) at 21:34

## 2022-01-23 RX ADMIN — CLONIDINE HYDROCHLORIDE 0.2 MG: 0.1 TABLET ORAL at 21:25

## 2022-01-23 ASSESSMENT — ACTIVITIES OF DAILY LIVING (ADL)
ADLS_ACUITY_SCORE: 10
ADLS_ACUITY_SCORE: 14
ADLS_ACUITY_SCORE: 10
ADLS_ACUITY_SCORE: 14
ADLS_ACUITY_SCORE: 10
ADLS_ACUITY_SCORE: 14
ADLS_ACUITY_SCORE: 10

## 2022-01-23 NOTE — PROGRESS NOTES
Pt has unique home care available to start on 1/24. Pt will not be able to admit to TCU until she is COVID recovered.     LASHAUN Wheeler  1/23/2022  8:28 AM

## 2022-01-23 NOTE — PLAN OF CARE
Problem: Adult Inpatient Plan of Care  Goal: Optimal Comfort and Wellbeing  Outcome: Improving     Problem: Risk for Delirium  Goal: Improved Sleep  Outcome: Improving     Problem: Electrolyte Imbalance  Goal: Electrolyte Balance  Outcome: Improving  Patient alert, oriented x 3. Pleasant and co-operative with cares. Saturation 92-94 % during the night. Patient reported feeling better each day. Pure wick in place for urine elimination. Denied pain/nausea/vomiting. Will continue to monitor the patient.

## 2022-01-23 NOTE — PLAN OF CARE
Pt denied pain this shift.  Ultrasound to right arm done this morning. Please see results review section for findings.   New orders to elevate and apply a warm compress to right arm. Lovenox on hold as well.   Hgb checked this afternoon. Level was 7.1.

## 2022-01-23 NOTE — PLAN OF CARE
Patient denies pain.  Ate well for supper meal. BG levels 211 and 93 this shift. Snack was given at bedtime.  TL PICC line Sl'd  Right arm with significant bruising from an old IV access. Called HO tonight as patient was complaining that it was uncomfortable. Upper arm does appear to be more edematous and firm tonight. DAPHNE, Dr Card, put in order for US.  O2 sat has remained above 90 this shift while on RA. When up in the chair and more awake sats run 95-96%. Tonight while laying in bed they are hovering around 90-92. Will leave on RA for now. Patient is on continuous pulse ox for monitoring.  Up in the chair for a few hours tonight. Assist of 1 with gait belt and walker.

## 2022-01-23 NOTE — TREATMENT PLAN
RCAT Treatment Plan    Patient Score: 10  Patient Acuity: 4    Clinical Indication for Therapy: history of bronchospasm    Therapy Ordered: Combivent inhaler QID; Albuterol prn     Assessment Summary:  Duoneb given X1 on schedule and RCAT assessment completed.  Breath sounds were clear in the upper lobes and more diminished towards the bases;  A few faint expiratory wheezes were noted in the upper lobes post Tx.  Pt is on room air; SpO2 is 94%.  Pt has a strong non productive cough.  Though, her acuity indicate prn bronchodilators, the presence of expiratory wheezes post Tx suggests that she may benefit from their continued use.  I recommend switching to Combivent inhaler QID, continue with Albuterol prn.  RT will reassess in 72 hours.        01/22/22 2039   RCAT Assessment   Reason for Assessment COPD   Pulmonary Status 4   Surgical Status 0   Chest X-ray 1   Respiratory Pattern 0   Mental Status 0   Breath Sounds 4   Cough Effectiveness 0   Level of Activity 1   O2 Required for SpO2>=92% 0   Acuity Level (points) 10   Acuity Level  4   Breath Sounds   Breath Sounds All Fields;LLL   All Lung Fields Breath Sounds clear   COLEMAN Breath Sounds clear   LLL Breath Sounds diminished;clear   RUL Breath Sounds clear   RML Breath Sounds diminished;clear   RLL Breath Sounds diminished;clear       Kailash Valdes, RT  1/22/2022

## 2022-01-23 NOTE — PROGRESS NOTES
Hospitalist Progress Note    Assessment/Plan    Active Problems:    Cough    Shortness of breath    Hypoxia    Acute kidney injury (H)    Elevated d-dimer    Infection due to 2019 novel coronavirus    Nonspecific elevation of levels of transaminase and lactic acid dehydrogenase (LDH)      66-year-old patient with history of diabetes, chronic tobacco abuse, alcohol abuse, was admitted to the hospital for acute respiratory failure with hypoxia secondary to COVID-19 infection     Acute respiratory failure with hypoxia secondary to COVID-19 infection ARDS,  Was intubated on January 14 through 17,  Unclear vaccination status she states that she had J&J vaccine  Symptoms started on December 28,  Received remdesivir and dexamethasone,  Oxygen requirement improving continue to be on room air  Continue to to be on isolation through January 28  She does not qualify for home oxygen continue to be stable overnight awaiting TCU placement  -I will hold her Lovenox due to large bruise in the arm     Acute COPD exacerbation now stable  Was initiated on Decadron January 8 through 14, she received Solu-Medrol started January 14 through 18,  Also was treated for community-acquired pneumonia with ceftriaxone finish the treatment course  Pulmonary saw her and started on Medrol taper on January 19 breathing comfortable, no wheezing or crackles        Acute metabolic encephalopathy  Resolved        Schizoaffective disorder  On Zyprexa and Zoloft, mood is stable,        Microcytic anemia  No signs of active bleeding, suspect due to alcohol abuse  We will repeat hemoglobin given the large bruise on the arm        Lactic acidosis, ROSINA, hypokalemia  Resolved with IV fluids and replacement    Right arm pain due to superficial thrombophlebitis  She was complaining of right ear pain ultrasound of the upper extremity showed occlusive thrombus in the basilar vein consistent with superficial thrombophlebitis also there is complex fluid collection  without internal vascularity which could represent a hematoma  Patient has a large bruise at that site likely from previous phlebectomy patient is currently on Lovenox will hold it for now and monitor, I also will order warm compresses      Hypertension  On amlodipine atenolol and clonidine  Along with as needed hydralazine blood pressure controlled        Type 2 diabetes  Lantus dose to 20 units at bedtime home dose of metformin being held, blood sugar controlled with insulin sliding scale during the day as well        GERD  Previous ulceration with alcohol abuse, on PPI     Severe malnutrition  Secondary to acute illness, she was noted to have weight loss, dietitian is following,     Tobacco abuse  On nicotine patch        Alcohol dependence  Admitted on drinking heavily rum and coke but states that she quit drinking 2 weeks prior to admission       Barriers to Discharge: Placement    Anticipated discharge date/Disposition: TCU when she consider recovers versus home if her strength improves    Subjective  Patient was seen this morning she was having pain in her right arm there is a large bruise on her arm and I explained to her that could be from blood draw and she is on the blood thinner which could have expanded, she is asking me when she can go home,    Objective    Vital signs in last 24 hours  Temp:  [97.6  F (36.4  C)-98.5  F (36.9  C)] 98.5  F (36.9  C)  Pulse:  [60-80] 63  Resp:  [18-20] 18  BP: (121-175)/(56-83) 141/83  SpO2:  [91 %-96 %] 93 % [unfilled] O2 Device: None (Room air)    Weight:   [unfilled] Weight change:     Intake/Output last 3 shifts  I/O last 3 completed shifts:  In: 1080 [P.O.:1080]  Out: 1250 [Urine:1250]  Body mass index is 29.47 kg/m .    Physical Exam:  General Appearance: Alert, oriented x3, does not appear in distress.  HEENT: Normocephalic. No scleral icterus, . Mucous membranes moist.  Heart: :Regular rate and rhythm, normal S1 ,S2, No murmurs, no JVD, no pedal edema   Lungs:  Clear to auscultation bilaterally. No wheezing or crackles  Abdomen: Soft, non tender, no rebound or rigidity, non distended, bowel sounds present.  Extremity: No deformity.  Large bruising on the right arm, soft, nontender to touch    Pertinent Labs   Lab Results: personally reviewed.   Recent Labs   Lab 01/17/22  0508      CO2 21*   BUN 23*     Recent Labs   Lab 01/19/22  0640 01/18/22  0625 01/17/22  0508   WBC 4.9 6.6 6.5   HGB 7.5* 7.6* 8.1*   HCT 23.6* 23.8* 24.9*    173 180     No results for input(s): CKTOTAL, TROPONINI in the last 168 hours.    Invalid input(s): TROPONINT, CKMBINDEX  Invalid input(s): POCGLUFGR        Pertinent Radiology   Radiology Results: Reviewed ultrasound extremity    Advanced Care Planning:  Discharge Planning discussed with patient's  Total time with this patient is 35 min with 50% of time spent in examining the patient, reviewing records, discussing plan of care and counseling, 50% of time spent in coordination of care.  Care discussed and coordinated with TYLER.      Annetta Stover MD  Internal Medicine Hospitalist  1/23/2022

## 2022-01-24 ENCOUNTER — APPOINTMENT (OUTPATIENT)
Dept: OCCUPATIONAL THERAPY | Facility: HOSPITAL | Age: 67
DRG: 208 | End: 2022-01-24
Payer: COMMERCIAL

## 2022-01-24 ENCOUNTER — APPOINTMENT (OUTPATIENT)
Dept: PHYSICAL THERAPY | Facility: HOSPITAL | Age: 67
DRG: 208 | End: 2022-01-24
Payer: COMMERCIAL

## 2022-01-24 LAB
ABO/RH(D): NORMAL
ANTIBODY SCREEN: NEGATIVE
BLD PROD TYP BPU: NORMAL
BLOOD COMPONENT TYPE: NORMAL
CODING SYSTEM: NORMAL
CREAT SERPL-MCNC: 0.91 MG/DL (ref 0.6–1.1)
CROSSMATCH: NORMAL
GFR SERPL CREATININE-BSD FRML MDRD: 69 ML/MIN/1.73M2
GLUCOSE BLDC GLUCOMTR-MCNC: 120 MG/DL (ref 70–99)
GLUCOSE BLDC GLUCOMTR-MCNC: 164 MG/DL (ref 70–99)
GLUCOSE BLDC GLUCOMTR-MCNC: 202 MG/DL (ref 70–99)
GLUCOSE BLDC GLUCOMTR-MCNC: 75 MG/DL (ref 70–99)
GLUCOSE BLDC GLUCOMTR-MCNC: 90 MG/DL (ref 70–99)
HGB BLD-MCNC: 6.5 G/DL (ref 11.7–15.7)
HGB BLD-MCNC: 8.7 G/DL (ref 11.7–15.7)
ISSUE DATE AND TIME: NORMAL
MAGNESIUM SERPL-MCNC: 1.8 MG/DL (ref 1.8–2.6)
POTASSIUM BLD-SCNC: 4 MMOL/L (ref 3.5–5)
SPECIMEN EXPIRATION DATE: NORMAL
UNIT ABO/RH: NORMAL
UNIT NUMBER: NORMAL
UNIT STATUS: NORMAL
UNIT TYPE ISBT: 5100

## 2022-01-24 PROCEDURE — 250N000013 HC RX MED GY IP 250 OP 250 PS 637: Performed by: INTERNAL MEDICINE

## 2022-01-24 PROCEDURE — 83735 ASSAY OF MAGNESIUM: CPT | Performed by: FAMILY MEDICINE

## 2022-01-24 PROCEDURE — 82565 ASSAY OF CREATININE: CPT | Performed by: FAMILY MEDICINE

## 2022-01-24 PROCEDURE — P9016 RBC LEUKOCYTES REDUCED: HCPCS | Performed by: HOSPITALIST

## 2022-01-24 PROCEDURE — 97116 GAIT TRAINING THERAPY: CPT | Mod: GP

## 2022-01-24 PROCEDURE — 85018 HEMOGLOBIN: CPT | Performed by: HOSPITALIST

## 2022-01-24 PROCEDURE — 250N000013 HC RX MED GY IP 250 OP 250 PS 637: Performed by: HOSPITALIST

## 2022-01-24 PROCEDURE — 99233 SBSQ HOSP IP/OBS HIGH 50: CPT | Performed by: HOSPITALIST

## 2022-01-24 PROCEDURE — 86923 COMPATIBILITY TEST ELECTRIC: CPT | Performed by: HOSPITALIST

## 2022-01-24 PROCEDURE — 86901 BLOOD TYPING SEROLOGIC RH(D): CPT | Performed by: HOSPITALIST

## 2022-01-24 PROCEDURE — 250N000011 HC RX IP 250 OP 636: Performed by: INTERNAL MEDICINE

## 2022-01-24 PROCEDURE — 97129 THER IVNTJ 1ST 15 MIN: CPT | Mod: GO

## 2022-01-24 PROCEDURE — 120N000001 HC R&B MED SURG/OB

## 2022-01-24 PROCEDURE — 97535 SELF CARE MNGMENT TRAINING: CPT | Mod: GO

## 2022-01-24 PROCEDURE — 84132 ASSAY OF SERUM POTASSIUM: CPT | Performed by: HOSPITALIST

## 2022-01-24 PROCEDURE — 250N000012 HC RX MED GY IP 250 OP 636 PS 637: Performed by: INTERNAL MEDICINE

## 2022-01-24 PROCEDURE — 250N000013 HC RX MED GY IP 250 OP 250 PS 637: Performed by: FAMILY MEDICINE

## 2022-01-24 PROCEDURE — 97110 THERAPEUTIC EXERCISES: CPT | Mod: GP

## 2022-01-24 PROCEDURE — 97530 THERAPEUTIC ACTIVITIES: CPT | Mod: GP

## 2022-01-24 RX ORDER — MAGNESIUM OXIDE 400 MG/1
400 TABLET ORAL 2 TIMES DAILY
Status: COMPLETED | OUTPATIENT
Start: 2022-01-24 | End: 2022-01-25

## 2022-01-24 RX ADMIN — IPRATROPIUM BROMIDE AND ALBUTEROL 1 PUFF: 20; 100 SPRAY, METERED RESPIRATORY (INHALATION) at 08:28

## 2022-01-24 RX ADMIN — IPRATROPIUM BROMIDE AND ALBUTEROL 1 PUFF: 20; 100 SPRAY, METERED RESPIRATORY (INHALATION) at 16:00

## 2022-01-24 RX ADMIN — POLYETHYLENE GLYCOL 3350 17 G: 17 POWDER, FOR SOLUTION ORAL at 08:27

## 2022-01-24 RX ADMIN — HYDRALAZINE HYDROCHLORIDE 10 MG: 20 INJECTION INTRAMUSCULAR; INTRAVENOUS at 22:17

## 2022-01-24 RX ADMIN — DOCUSATE SODIUM 50 MG AND SENNOSIDES 8.6 MG 1 TABLET: 8.6; 5 TABLET, FILM COATED ORAL at 17:08

## 2022-01-24 RX ADMIN — PREDNISONE 30 MG: 5 TABLET ORAL at 08:27

## 2022-01-24 RX ADMIN — SERTRALINE HYDROCHLORIDE 150 MG: 50 TABLET ORAL at 08:27

## 2022-01-24 RX ADMIN — SIMVASTATIN 40 MG: 40 TABLET, FILM COATED ORAL at 20:53

## 2022-01-24 RX ADMIN — Medication 1 TABLET: at 08:29

## 2022-01-24 RX ADMIN — HYDRALAZINE HYDROCHLORIDE 10 MG: 20 INJECTION INTRAMUSCULAR; INTRAVENOUS at 13:54

## 2022-01-24 RX ADMIN — FOLIC ACID 1 MG: 1 TABLET ORAL at 08:28

## 2022-01-24 RX ADMIN — OLANZAPINE 15 MG: 10 TABLET, FILM COATED ORAL at 20:53

## 2022-01-24 RX ADMIN — CLONIDINE HYDROCHLORIDE 0.2 MG: 0.1 TABLET ORAL at 20:53

## 2022-01-24 RX ADMIN — IPRATROPIUM BROMIDE AND ALBUTEROL 1 PUFF: 20; 100 SPRAY, METERED RESPIRATORY (INHALATION) at 20:51

## 2022-01-24 RX ADMIN — AMLODIPINE BESYLATE 10 MG: 5 TABLET ORAL at 08:28

## 2022-01-24 RX ADMIN — MAGNESIUM OXIDE TAB 400 MG (241.3 MG ELEMENTAL MG) 400 MG: 400 (241.3 MG) TAB at 09:14

## 2022-01-24 RX ADMIN — ASPIRIN 81 MG CHEWABLE TABLET 81 MG: 81 TABLET CHEWABLE at 08:27

## 2022-01-24 RX ADMIN — ATENOLOL 50 MG: 25 TABLET ORAL at 08:28

## 2022-01-24 RX ADMIN — CLONIDINE HYDROCHLORIDE 0.2 MG: 0.1 TABLET ORAL at 08:28

## 2022-01-24 RX ADMIN — PANTOPRAZOLE SODIUM 40 MG: 40 TABLET, DELAYED RELEASE ORAL at 08:27

## 2022-01-24 RX ADMIN — IPRATROPIUM BROMIDE AND ALBUTEROL 1 PUFF: 20; 100 SPRAY, METERED RESPIRATORY (INHALATION) at 12:55

## 2022-01-24 RX ADMIN — MAGNESIUM OXIDE TAB 400 MG (241.3 MG ELEMENTAL MG) 400 MG: 400 (241.3 MG) TAB at 20:53

## 2022-01-24 ASSESSMENT — ACTIVITIES OF DAILY LIVING (ADL)
ADLS_ACUITY_SCORE: 10
ADLS_ACUITY_SCORE: 12
ADLS_ACUITY_SCORE: 10
ADLS_ACUITY_SCORE: 10
ADLS_ACUITY_SCORE: 12
ADLS_ACUITY_SCORE: 10
ADLS_ACUITY_SCORE: 12
ADLS_ACUITY_SCORE: 12
ADLS_ACUITY_SCORE: 10
ADLS_ACUITY_SCORE: 12
ADLS_ACUITY_SCORE: 10
ADLS_ACUITY_SCORE: 10
ADLS_ACUITY_SCORE: 12
ADLS_ACUITY_SCORE: 10
ADLS_ACUITY_SCORE: 10

## 2022-01-24 NOTE — PROGRESS NOTES
Care Management Follow Up    Length of Stay (days): 16    Expected Discharge Date: 1/26/22     Concerns to be Addressed: COVID +, placement at TCU, drop in hgb with need for blood transfusion, further therapies.  Patient plan of care discussed at interdisciplinary rounds: Yes    Anticipated Discharge Disposition: TCU vs HC pending progression     Anticipated Discharge Services:    Anticipated Discharge DME:      Patient/family educated on Medicare website which has current facility and service quality ratings:    Education Provided on the Discharge Plan:    Patient/Family in Agreement with the Plan:      Referrals Placed by CM/SW:    Private pay costs discussed: Not applicable    Additional Information:  Pt. Will not be considered Covid recovered until 1/28....likely not able to find accepting TCU until then. May progress where she can go home with HC services. CM to continue to follow PT/OT recs.     Darlene Cavanaugh RN

## 2022-01-24 NOTE — PLAN OF CARE
Problem: Adult Inpatient Plan of Care  Goal: Absence of Hospital-Acquired Illness or Injury  Outcome: No Change  Intervention: Identify and Manage Fall Risk  Flowsheets  Taken 1/24/2022 1400  Safety Promotion/Fall Prevention:   activity supervised   clutter free environment maintained   fall prevention program maintained   nonskid shoes/slippers when out of bed   patient and family education  Taken 1/24/2022 0900  Safety Promotion/Fall Prevention:   activity supervised   chair alarm on   clutter free environment maintained   fall prevention program maintained   increased rounding and observation   nonskid shoes/slippers when out of bed   patient and family education  Intervention: Prevent Skin Injury  Recent Flowsheet Documentation  Taken 1/24/2022 0900 by Ashley Chávez RN  Body Position: position changed independently     Problem: Adult Inpatient Plan of Care  Goal: Plan of Care Review  Outcome: Improving  Flowsheets (Taken 1/24/2022 1400)  Plan of Care Reviewed With: patient  Outcome Summary: monitoring HGB and right arm hematoma- transfussing 1 unit pc's this shift. BP elevated- prn hydralazine to keep <160

## 2022-01-24 NOTE — PROGRESS NOTES
Hospitalist Progress Note    Assessment/Plan    Active Problems:    Cough    Shortness of breath    Hypoxia    Acute kidney injury (H)    Elevated d-dimer    Infection due to 2019 novel coronavirus    Nonspecific elevation of levels of transaminase and lactic acid dehydrogenase (LDH)    66-year-old patient with history of diabetes, tobacco abuse, admitted to the hospital with acute respiratory failure with hypoxia secondary to COVID-19 infection    Acute respiratory failure with hypoxia secondary to COVID-19 infection, ARDS  Symptoms started on December 28, tested positive on January 8,  Unclear vaccination status but she says she had to change a vaccine  she was intubated from January 14-17, currently on room air  Received remdesivir dexamethasone,  Was on Lovenox twice daily but it was held secondary to expanding arm hematoma  Will be of isolation from January 28      COPD exacerbation  Was on Decadron on January 8-14, received Solu-Medrol, started January 14-18,  Needed for community-acquired pneumonia with ceftriaxone, finish the treatment course  Previously pulmonary saw her and started her on Medrol taper,  Appears to be resolving, no wheezing or crackles    Acute on chronic anemia  She has history of alcohol abuse and underlying chronic anemia hemoglobin was around 7.1, this morning hemoglobin dropped to 6.5,  Consent obtained and patient ordered to have 1 unit of blood transfusion possible causes start hematoma in her arm, will monitor for expansion, currently holding blood thinner, will continue to monitor hemoglobin      Lactic acidosis, ROSINA, hypokalemia  Resolved with IV fluids and replacement     Right arm pain due to superficial thrombophlebitis  She was complaining of right arm pain ultrasound of the upper extremity showed occlusive thrombus in the basilic  vein consistent with superficial thrombophlebitis also there is complex fluid collection without internal vascularity which could represent a  hematoma  Patient has a large bruise at that site likely from previous phlebectomy patient is currently on Lovenox will hold it for now and monitor,  Hemoglobin dropped today, will monitor the hematoma      Hypertension  On amlodipine atenolol and clonidine  Along with as needed hydralazine blood pressure controlled        Type 2 diabetes  Lantus dose to 20 units at bedtime home dose of metformin being held, blood sugar controlled with insulin sliding scale during the day as well        GERD  Previous ulceration with alcohol abuse, on PPI   if hemoglobin continues to drop we will consider further work-up     Severe malnutrition  Secondary to acute illness, she was noted to have weight loss, dietitian is following,     Tobacco abuse  On nicotine patch        Alcohol abuse  Admitted on drinking heavily rum and coke but states that she quit drinking 2 weeks prior to admission            Barriers to Discharge: TCU placement, monitor hemoglobin    Anticipated discharge date/Disposition: TCU when bed available    Subjective  Patient was seen today, I discussed with her about her hemoglobin and consent for blood transfusion, she denies any worsening pain in her bruising site,    Objective    Vital signs in last 24 hours  Temp:  [98  F (36.7  C)-99  F (37.2  C)] 98  F (36.7  C)  Pulse:  [60-68] 60  Resp:  [18-20] 18  BP: (133-169)/(66-83) 136/66  SpO2:  [93 %-96 %] 94 % [unfilled] O2 Device: None (Room air)    Weight:   [unfilled] Weight change:     Intake/Output last 3 shifts  I/O last 3 completed shifts:  In: 1180 [P.O.:1180]  Out: 1700 [Urine:1700]  Body mass index is 29.47 kg/m .    Physical Exam:  General Appearance: Alert, oriented x3, does not appear in distress.  HEENT: Normocephalic. No scleral icterus, . Mucous membranes moist.  Heart: :Regular rate and rhythm, normal S1 ,S2, No murmurs, no JVD, no pedal edema   Lungs: Clear to auscultation bilaterally. No wheezing or crackles  Abdomen: Soft, non tender, no  rebound or rigidity, non distended, bowel sounds present.  Extremity: Large, hematoma and bruising of the right arm extending to the inner forearm, no change in size since yesterday nontender to palpation,    Pertinent Labs   Lab Results: personally reviewed.   No results for input(s): NA, CO2, BUN, CREATININE, ALBUMIN, BILITOT, ALKPHOS, ALT, AST, GLUCOSE in the last 168 hours.    Invalid input(s): K, CL, CALCIUM, LABALBU, PROT, MG  Recent Labs   Lab 01/24/22  0636 01/23/22  1435 01/19/22  0640 01/18/22  0625   WBC  --   --  4.9 6.6   HGB 6.5* 7.1* 7.5* 7.6*   HCT  --   --  23.6* 23.8*   PLT  --   --  179 173     No results for input(s): CKTOTAL, TROPONINI in the last 168 hours.    Invalid input(s): TROPONINT, CKMBINDEX  Invalid input(s): POCGLUFGR          Advanced Care Planning:  Discharge Planning discussed with patient  Total time with this patient is 35 min with 50% of time spent in examining the patient, reviewing records, discussing plan of care and counseling, 50% of time spent in coordination of care.  Care discussed and coordinated with RN, care manager.      Annetta Stover MD  Internal Medicine Hospitalist  1/24/2022

## 2022-01-24 NOTE — PLAN OF CARE
Denies pain.  Eating well for meals.  TL PICC line Sl'd  Right arm edematous, bruised, seems softer today than last evening. Arm elevated on pillow and warm towel applied at intervals.  K and Mg protocol are both rechecks for am.  Sats maintaining > 93% even when in bed so continue RA overnight.  Up in the chair but really needs to increase activity.

## 2022-01-24 NOTE — PLAN OF CARE
Problem: Gas Exchange Impaired  Goal: Optimal Gas Exchange  Outcome: No Change     Problem: Risk for Delirium  Goal: Improved Sleep  Outcome: Improving      Pt on RA sating around 97% when awake and 92% while sleeping. Pt denies pain. Pt BS 90. Pt stated she needed to have a BM, up to bedside commode, pt unable stating she wanted more Miralax. Pt slept through most of shift.  Pt had critical lab hemoglobin 6.5, notified Ck at 0700. Lab notified writer at 0654.     Kizzy Mcdonald RN

## 2022-01-25 ENCOUNTER — APPOINTMENT (OUTPATIENT)
Dept: PHYSICAL THERAPY | Facility: HOSPITAL | Age: 67
DRG: 208 | End: 2022-01-25
Payer: COMMERCIAL

## 2022-01-25 LAB
GLUCOSE BLDC GLUCOMTR-MCNC: 108 MG/DL (ref 70–99)
GLUCOSE BLDC GLUCOMTR-MCNC: 111 MG/DL (ref 70–99)
GLUCOSE BLDC GLUCOMTR-MCNC: 148 MG/DL (ref 70–99)
GLUCOSE BLDC GLUCOMTR-MCNC: 75 MG/DL (ref 70–99)
GLUCOSE BLDC GLUCOMTR-MCNC: 92 MG/DL (ref 70–99)
HGB BLD-MCNC: 7.8 G/DL (ref 11.7–15.7)
HGB BLD-MCNC: 8.3 G/DL (ref 11.7–15.7)
POTASSIUM BLD-SCNC: 3.7 MMOL/L (ref 3.5–5)

## 2022-01-25 PROCEDURE — 85018 HEMOGLOBIN: CPT | Performed by: HOSPITALIST

## 2022-01-25 PROCEDURE — 97116 GAIT TRAINING THERAPY: CPT | Mod: GP

## 2022-01-25 PROCEDURE — 250N000012 HC RX MED GY IP 250 OP 636 PS 637: Performed by: INTERNAL MEDICINE

## 2022-01-25 PROCEDURE — 250N000013 HC RX MED GY IP 250 OP 250 PS 637: Performed by: INTERNAL MEDICINE

## 2022-01-25 PROCEDURE — 84132 ASSAY OF SERUM POTASSIUM: CPT | Performed by: HOSPITALIST

## 2022-01-25 PROCEDURE — 250N000013 HC RX MED GY IP 250 OP 250 PS 637: Performed by: FAMILY MEDICINE

## 2022-01-25 PROCEDURE — 97112 NEUROMUSCULAR REEDUCATION: CPT | Mod: GP

## 2022-01-25 PROCEDURE — 250N000011 HC RX IP 250 OP 636: Performed by: INTERNAL MEDICINE

## 2022-01-25 PROCEDURE — 97530 THERAPEUTIC ACTIVITIES: CPT | Mod: GP

## 2022-01-25 PROCEDURE — 99232 SBSQ HOSP IP/OBS MODERATE 35: CPT | Performed by: INTERNAL MEDICINE

## 2022-01-25 PROCEDURE — 250N000013 HC RX MED GY IP 250 OP 250 PS 637: Performed by: HOSPITALIST

## 2022-01-25 PROCEDURE — 120N000001 HC R&B MED SURG/OB

## 2022-01-25 RX ORDER — POTASSIUM CHLORIDE 1500 MG/1
20 TABLET, EXTENDED RELEASE ORAL ONCE
Status: COMPLETED | OUTPATIENT
Start: 2022-01-25 | End: 2022-01-25

## 2022-01-25 RX ADMIN — Medication 1 TABLET: at 09:19

## 2022-01-25 RX ADMIN — IPRATROPIUM BROMIDE AND ALBUTEROL 1 PUFF: 20; 100 SPRAY, METERED RESPIRATORY (INHALATION) at 12:43

## 2022-01-25 RX ADMIN — HYDRALAZINE HYDROCHLORIDE 10 MG: 20 INJECTION INTRAMUSCULAR; INTRAVENOUS at 17:45

## 2022-01-25 RX ADMIN — AMLODIPINE BESYLATE 10 MG: 5 TABLET ORAL at 09:20

## 2022-01-25 RX ADMIN — MAGNESIUM OXIDE TAB 400 MG (241.3 MG ELEMENTAL MG) 400 MG: 400 (241.3 MG) TAB at 09:20

## 2022-01-25 RX ADMIN — IPRATROPIUM BROMIDE AND ALBUTEROL 1 PUFF: 20; 100 SPRAY, METERED RESPIRATORY (INHALATION) at 09:24

## 2022-01-25 RX ADMIN — SIMVASTATIN 40 MG: 40 TABLET, FILM COATED ORAL at 20:47

## 2022-01-25 RX ADMIN — IPRATROPIUM BROMIDE AND ALBUTEROL 1 PUFF: 20; 100 SPRAY, METERED RESPIRATORY (INHALATION) at 17:17

## 2022-01-25 RX ADMIN — CLONIDINE HYDROCHLORIDE 0.2 MG: 0.1 TABLET ORAL at 09:20

## 2022-01-25 RX ADMIN — POTASSIUM CHLORIDE 20 MEQ: 20 TABLET, EXTENDED RELEASE ORAL at 09:20

## 2022-01-25 RX ADMIN — OLANZAPINE 15 MG: 10 TABLET, FILM COATED ORAL at 20:47

## 2022-01-25 RX ADMIN — CLONIDINE HYDROCHLORIDE 0.2 MG: 0.1 TABLET ORAL at 20:47

## 2022-01-25 RX ADMIN — POLYETHYLENE GLYCOL 3350 17 G: 17 POWDER, FOR SOLUTION ORAL at 09:19

## 2022-01-25 RX ADMIN — IPRATROPIUM BROMIDE AND ALBUTEROL 1 PUFF: 20; 100 SPRAY, METERED RESPIRATORY (INHALATION) at 20:46

## 2022-01-25 RX ADMIN — ASPIRIN 81 MG CHEWABLE TABLET 81 MG: 81 TABLET CHEWABLE at 09:19

## 2022-01-25 RX ADMIN — SERTRALINE HYDROCHLORIDE 150 MG: 50 TABLET ORAL at 09:20

## 2022-01-25 RX ADMIN — MAGNESIUM OXIDE TAB 400 MG (241.3 MG ELEMENTAL MG) 400 MG: 400 (241.3 MG) TAB at 20:47

## 2022-01-25 RX ADMIN — FOLIC ACID 1 MG: 1 TABLET ORAL at 09:19

## 2022-01-25 RX ADMIN — PREDNISONE 20 MG: 20 TABLET ORAL at 09:20

## 2022-01-25 RX ADMIN — PANTOPRAZOLE SODIUM 40 MG: 40 TABLET, DELAYED RELEASE ORAL at 09:19

## 2022-01-25 RX ADMIN — ATENOLOL 50 MG: 25 TABLET ORAL at 09:19

## 2022-01-25 ASSESSMENT — ACTIVITIES OF DAILY LIVING (ADL)
ADLS_ACUITY_SCORE: 12
ADLS_ACUITY_SCORE: 10
ADLS_ACUITY_SCORE: 14
ADLS_ACUITY_SCORE: 12
ADLS_ACUITY_SCORE: 14
ADLS_ACUITY_SCORE: 12
ADLS_ACUITY_SCORE: 10
ADLS_ACUITY_SCORE: 12
ADLS_ACUITY_SCORE: 12
ADLS_ACUITY_SCORE: 14
ADLS_ACUITY_SCORE: 12
ADLS_ACUITY_SCORE: 8
ADLS_ACUITY_SCORE: 12
ADLS_ACUITY_SCORE: 8
ADLS_ACUITY_SCORE: 12
ADLS_ACUITY_SCORE: 12
ADLS_ACUITY_SCORE: 8
ADLS_ACUITY_SCORE: 12

## 2022-01-25 NOTE — PROGRESS NOTES
[facility]    PROGRESS NOTE - Hospitalist Service    Assessment and Plan    Active Problems:    Cough    Shortness of breath    Hypoxia    Acute kidney injury (H)    Elevated d-dimer    Infection due to 2019 novel coronavirus    Nonspecific elevation of levels of transaminase and lactic acid dehydrogenase (LDH)    Geovanna Hfuf is a 66 year old old female with h/o diabetes, tobacco abuse, admitted to the hospital with acute respiratory failure with hypoxia secondary to COVID-19 infection.   Symptoms started on December 28, tested positive on January 8, Unclear vaccination status but she says she had to change a vaccine  Transferred to ICU 1/10  she was intubated from January 14-17  Transferred out of ICU 1/17      Acute respiratory failure with hypoxia secondary to COVID-19 infection, ARDS  - Received remdesivir 1/8/2022 completed and dexamethasone 1/8/2022 completed   - Was on Lovenox twice daily but it was held secondary to expanding arm hematoma  - Will be of isolation from January 28  - no longer requiring O2      COPD exacerbation  - Was on Decadron on January 8-14, received Solu-Medrol, started January 14-18,  - Needed for community-acquired pneumonia with ceftriaxone, finished the treatment course  - Previously pulmonary following and placed on prednisone taper and signed off   - not requiring O2      Acute on chronic anemia  She has history of alcohol abuse and underlying chronic anemia hemoglobin was around 7.1, this morning hemoglobin dropped to 6.5,  Consent obtained   - received 1 unit PRBC on 1/24/2022       Lactic acidosis, ROSINA, hypokalemia  Resolved with IV fluids and replacement     Right arm pain due to superficial thrombophlebitis  She was complaining of right arm pain ultrasound of the upper extremity showed occlusive thrombus in the basilic  vein consistent with superficial thrombophlebitis also there is complex fluid collection without internal vascularity which could  represent a hematoma  Patient has a large bruise at that site likely from previous phlebectomy as above       Hypertension  On amlodipine atenolol and clonidine  Along with as needed hydralazine blood pressure controlled        Type 2 diabetes  -  Lantus dose to 8 units at bedtime   - home dose of metformin being held  - novolog sliding scale      GERD  Previous ulceration with alcohol abuse, on PPI   if hemoglobin continues to drop we will consider further work-up     Severe malnutrition  Secondary to acute illness, she was noted to have weight loss, dietitian is following,     Tobacco abuse  On nicotine patch    COVID-19 PCR Results    COVID-19 PCR Results 1/8/22   SARS CoV2 PCR Positive (A)   (A) Abnormal value       Comments are available for some flowsheets but are not being displayed.         COVID-19 Antibody Results, Testing for Immunity    COVID-19 Antibody Results, Testing for Immunity   No data to display.            VTE prophylaxis:  Pneumatic Compression Devices  DIET: Orders Placed This Encounter      High Consistent Carb (75 g CHO per Meal) Diet    Drains/Lines: none  Weight bearing status: WBAT  Disposition/Barriers to discharge: possible discharge 1-2 days pending progression to HC vs TCU   Code Status: Full Code    Subjective:  Eager to discharge discharged but concerned about her arm informed her we are watching this to ensure not increasing and her Hgb stays stable     PHYSICAL EXAM  Temp:  [97.9  F (36.6  C)-98.5  F (36.9  C)] 98.3  F (36.8  C)  Pulse:  [57-67] 63  Resp:  [16-20] 20  BP: (136-181)/(63-94) 166/88  SpO2:  [93 %-100 %] 93 %  Wt Readings from Last 1 Encounters:   01/22/22 73.1 kg (161 lb 1.6 oz)       Intake/Output Summary (Last 24 hours) at 1/25/2022 0855  Last data filed at 1/24/2022 2217  Gross per 24 hour   Intake 958 ml   Output --   Net 958 ml      Body mass index is 29.47 kg/m .    Physical Exam  Constitutional:       Appearance: Normal appearance.   HENT:      Head:  Normocephalic and atraumatic.      Mouth/Throat:      Mouth: Mucous membranes are moist.   Eyes:      Comments: exophthalmos    Cardiovascular:      Rate and Rhythm: Normal rate and regular rhythm.      Pulses: Normal pulses.   Pulmonary:      Effort: Pulmonary effort is normal.      Breath sounds: Normal breath sounds.   Abdominal:      General: Bowel sounds are normal.      Palpations: Abdomen is soft.   Musculoskeletal:      Comments: RUE ecchymosis from wrist to upper not spread outside marker, older appearing rivas snot look new.    Skin:     General: Skin is warm and dry.   Neurological:      Mental Status: She is alert. She is disoriented.       PERTINENT LABS/IMAGING:  Results for orders placed or performed during the hospital encounter of 01/08/22   CT Chest Pulmonary Embolism w Contrast    Impression    IMPRESSION:  1.  Moderate bilateral pulmonary infiltrates consistent with COVID 19 pneumonitis.  2.  No pulmonary embolus.  3.  Coronary artery disease.   XR Chest Port 1 View    Impression    IMPRESSION: Mild to moderate patchy bilateral multilobar airspace disease, most pronounced within the right upper lobe and medial lung bases and mildly progressed since the CT from 01/08/2022. No definite pleural effusion. Stable heart size.   XR Chest Port 1 View    Impression    IMPRESSION: ET tube in good position with tip 2.4 cm above soy. NG tube stents in the stomach. Left PICC line in good position with tip low SVC.  Patchy mixed interstitial and groundglass infiltrates present bilaterally consistent with pneumonia. Borderline cardiomegaly.   XR Chest 1 View    Impression    IMPRESSION: Low endotracheal tube terminates at 1.7 cm from the soy, consider 3 to 4 cm retraction. Enteric tube terminates below diaphragm and field of view. Left PICC line with tip over SVC.    Improved aeration with mild residual right basilar opacity. No pneumothorax. Cardiomediastinal silhouette within normal limits.   US Upper  Extremity Venous Duplex Right    Impression    IMPRESSION:  1.  Short segment occlusive superficial thrombophlebitis in the basilic vein just proximal to the antecubital fossa.  2.  Large complex fluid collection in the upper arm without internal vascularity may represent a hematoma, though stability cannot be assessed by ultrasound.       N/A    \    7.8 (L)    /    N/A   N N/A    L N/A    N/A    N/A    N/A /   ------------------------------------ 111 (H)   ALT N/A   AST N/A   AP N/A   ALB N/A   Ca N/A  3.7    N/A    N/A \    % RETIC N/A    LDH N/A  Troponin N/A    BNP N/A    CK N/A  INR N/A   PTT N/A    D-dimer N/A    Fibrinogen N/A    Antithrombin N/A  Ferritin N/A  CRP N/A    IL-6 N/A  No results for input(s): CHOL, HDL, LDL, TRIG, CHOLHDLRATIO in the last 71827 hours.  No results for input(s): LDL in the last 76904 hours.  Recent Labs   Lab Test 01/25/22  0604 01/25/22  0319 01/24/22  0759 01/24/22  0636 01/17/22  0800 01/17/22  0508   NA  --   --   --   --   --  138   POTASSIUM 3.7  --   --  4.0   < > 4.5   CHLORIDE  --   --   --   --   --  109*   CO2  --   --   --   --   --  21*   GLC  --  92   < >  --    < > 273*   BUN  --   --   --   --   --  23*   CR  --   --   --  0.91  --  0.95   GFRESTIMATED  --   --   --  69  --  66   OSWALDO  --   --   --   --   --  7.5*    < > = values in this interval not displayed.     Recent Labs   Lab Test 01/08/22  1612 05/21/19  0315   A1C 4.7 4.5     Recent Labs   Lab Test 01/25/22  0604 01/24/22  1836 01/24/22  0636   HGB 7.8* 8.7* 6.5*     Recent Labs   Lab Test 01/08/22  1612 05/21/19  1451 05/21/19  0731   TROPONINI 0.24 <0.01 0.01     Recent Labs   Lab Test 01/08/22  1612 05/20/19  2202   BNP 30 65     No results for input(s): TSH in the last 53027 hours.  Recent Labs   Lab Test 01/08/22  1612 05/20/19  2217   INR 1.09 0.91       Rochelle Bolanos MD  [facility]  [phone]

## 2022-01-25 NOTE — PLAN OF CARE
Patient will use her call light but is impulsive and get up before you enter her room. Her blood sugars have been below the sliding scale and she has been receiving insulin per carb count. She has a good appetite today. Patient denies pain and has been up in her chair most of the morning. Her right arm has a large hematoma that has been marked. Patient has been pleasant.          Problem: Adult Inpatient Plan of Care  Goal: Optimal Comfort and Wellbeing  Outcome: Improving  Intervention: Provide Person-Centered Care  Recent Flowsheet Documentation  Taken 1/25/2022 0938 by Ambika Elizondo, RN  Trust Relationship/Rapport: care explained     Problem: Adult Inpatient Plan of Care  Goal: Absence of Hospital-Acquired Illness or Injury  Intervention: Prevent Skin Injury  Recent Flowsheet Documentation  Taken 1/25/2022 0938 by Ambika Elizondo, RN  Body Position: position changed independently

## 2022-01-25 NOTE — PLAN OF CARE
Problem: Gas Exchange Impaired  Goal: Optimal Gas Exchange  Intervention: Optimize Oxygenation and Ventilation  Recent Flowsheet Documentation  Taken 1/25/2022 0350 by Jason Rodríguez, RN  Head of Bed (HOB) Positioning: HOB at 20-30 degrees  Taken 1/25/2022 0348 by Jason Rodríguez, RN  Head of Bed (HOB) Positioning: HOB at 20-30 degrees  Taken 1/25/2022 0036 by Jason Rodríguez, RN  Head of Bed (HOB) Positioning: HOB at 20-30 degrees     Pt denied and offered no c/o pain this shift. Hematoma to RUE was marked by previous shift and did not permeate marked lines. Neurovascular assessment to RUE: WDL. Continued on RA at 95%. Absent of dyspnea w/ exertion. LS: diminished but absent of adventitious sounds. Triple lumen PICC to LUE remained patent, dressing changed on evening shift 1/24.VSS: Lovenox continues to be on hold. SBA w/ FWW to restroom. Occassional incontinence of urine. Attempted to get out of bed by self x1, bed alarm alerted staff.

## 2022-01-25 NOTE — PLAN OF CARE
Problem: Adult Inpatient Plan of Care  Goal: Absence of Hospital-Acquired Illness or Injury  Intervention: Identify and Manage Fall Risk  Recent Flowsheet Documentation  Taken 1/24/2022 1551 by Casper Dang, RN  Safety Promotion/Fall Prevention:   activity supervised   lighting adjusted   mobility aid in reach   Pt has not attempted to get up with assistance. Pt able to ambulate independently with stand by assist with gait belt and walker to bathroom.     Problem: Gas Exchange Impaired  Goal: Optimal Gas Exchange  Outcome: Improving   Pt maintaining o2 sats on room air greater than 93%.    Hgb level 8.7 after blood transfusion today. Hematoma on right arm appears to be stable. Cont to monitor. Gerard edges.

## 2022-01-26 ENCOUNTER — APPOINTMENT (OUTPATIENT)
Dept: OCCUPATIONAL THERAPY | Facility: HOSPITAL | Age: 67
DRG: 208 | End: 2022-01-26
Payer: COMMERCIAL

## 2022-01-26 ENCOUNTER — APPOINTMENT (OUTPATIENT)
Dept: PHYSICAL THERAPY | Facility: HOSPITAL | Age: 67
DRG: 208 | End: 2022-01-26
Payer: COMMERCIAL

## 2022-01-26 LAB
BASOPHILS # BLD AUTO: 0 10E3/UL (ref 0–0.2)
BASOPHILS NFR BLD AUTO: 0 %
CREAT SERPL-MCNC: 1.03 MG/DL (ref 0.6–1.1)
EOSINOPHIL # BLD AUTO: 0.2 10E3/UL (ref 0–0.7)
EOSINOPHIL NFR BLD AUTO: 5 %
ERYTHROCYTE [DISTWIDTH] IN BLOOD BY AUTOMATED COUNT: 18.2 % (ref 10–15)
FERRITIN SERPL-MCNC: 120 NG/ML (ref 10–130)
FOLATE SERPL-MCNC: >20 NG/ML
GFR SERPL CREATININE-BSD FRML MDRD: 60 ML/MIN/1.73M2
GLUCOSE BLDC GLUCOMTR-MCNC: 139 MG/DL (ref 70–99)
GLUCOSE BLDC GLUCOMTR-MCNC: 156 MG/DL (ref 70–99)
GLUCOSE BLDC GLUCOMTR-MCNC: 79 MG/DL (ref 70–99)
GLUCOSE BLDC GLUCOMTR-MCNC: 87 MG/DL (ref 70–99)
GLUCOSE BLDC GLUCOMTR-MCNC: 92 MG/DL (ref 70–99)
HCT VFR BLD AUTO: 25.3 % (ref 35–47)
HGB BLD-MCNC: 8.1 G/DL (ref 11.7–15.7)
HGB BLD-MCNC: 8.1 G/DL (ref 11.7–15.7)
IMM GRANULOCYTES # BLD: 0 10E3/UL
IMM GRANULOCYTES NFR BLD: 1 %
IRON SATN MFR SERPL: 20 % (ref 20–50)
IRON SERPL-MCNC: 66 UG/DL (ref 42–175)
LYMPHOCYTES # BLD AUTO: 1.5 10E3/UL (ref 0.8–5.3)
LYMPHOCYTES NFR BLD AUTO: 31 %
MAGNESIUM SERPL-MCNC: 1.9 MG/DL (ref 1.8–2.6)
MCH RBC QN AUTO: 34 PG (ref 26.5–33)
MCHC RBC AUTO-ENTMCNC: 32.5 G/DL (ref 31.5–36.5)
MCV RBC AUTO: 108 FL (ref 78–100)
MONOCYTES # BLD AUTO: 0.3 10E3/UL (ref 0–1.3)
MONOCYTES NFR BLD AUTO: 6 %
NEUTROPHILS # BLD AUTO: 2.8 10E3/UL (ref 1.6–8.3)
NEUTROPHILS NFR BLD AUTO: 57 %
NRBC # BLD AUTO: 0 10E3/UL
NRBC BLD AUTO-RTO: 0 /100
PLATELET # BLD AUTO: 122 10E3/UL (ref 150–450)
PLATELET # BLD AUTO: ABNORMAL 10*3/UL
POTASSIUM BLD-SCNC: 3.8 MMOL/L (ref 3.5–5)
RBC # BLD AUTO: 2.35 10E6/UL (ref 3.8–5.2)
TIBC SERPL-MCNC: 338 UG/DL (ref 313–563)
TRANSFERRIN SERPL-MCNC: 270 MG/DL (ref 212–360)
VIT B12 SERPL-MCNC: 275 PG/ML (ref 213–816)
WBC # BLD AUTO: 4.8 10E3/UL (ref 4–11)

## 2022-01-26 PROCEDURE — 250N000013 HC RX MED GY IP 250 OP 250 PS 637: Performed by: INTERNAL MEDICINE

## 2022-01-26 PROCEDURE — 97116 GAIT TRAINING THERAPY: CPT | Mod: GP

## 2022-01-26 PROCEDURE — 82728 ASSAY OF FERRITIN: CPT | Performed by: INTERNAL MEDICINE

## 2022-01-26 PROCEDURE — 99233 SBSQ HOSP IP/OBS HIGH 50: CPT | Performed by: INTERNAL MEDICINE

## 2022-01-26 PROCEDURE — 97535 SELF CARE MNGMENT TRAINING: CPT | Mod: GO

## 2022-01-26 PROCEDURE — 84132 ASSAY OF SERUM POTASSIUM: CPT | Performed by: INTERNAL MEDICINE

## 2022-01-26 PROCEDURE — 120N000001 HC R&B MED SURG/OB

## 2022-01-26 PROCEDURE — 82565 ASSAY OF CREATININE: CPT | Performed by: INTERNAL MEDICINE

## 2022-01-26 PROCEDURE — 82746 ASSAY OF FOLIC ACID SERUM: CPT | Performed by: INTERNAL MEDICINE

## 2022-01-26 PROCEDURE — 83735 ASSAY OF MAGNESIUM: CPT | Performed by: INTERNAL MEDICINE

## 2022-01-26 PROCEDURE — 85018 HEMOGLOBIN: CPT | Performed by: HOSPITALIST

## 2022-01-26 PROCEDURE — 250N000013 HC RX MED GY IP 250 OP 250 PS 637: Performed by: HOSPITALIST

## 2022-01-26 PROCEDURE — 83540 ASSAY OF IRON: CPT | Performed by: INTERNAL MEDICINE

## 2022-01-26 PROCEDURE — 85049 AUTOMATED PLATELET COUNT: CPT | Performed by: INTERNAL MEDICINE

## 2022-01-26 PROCEDURE — 97530 THERAPEUTIC ACTIVITIES: CPT | Mod: GP

## 2022-01-26 PROCEDURE — 82607 VITAMIN B-12: CPT | Performed by: INTERNAL MEDICINE

## 2022-01-26 PROCEDURE — 250N000011 HC RX IP 250 OP 636: Performed by: INTERNAL MEDICINE

## 2022-01-26 PROCEDURE — 250N000013 HC RX MED GY IP 250 OP 250 PS 637: Performed by: FAMILY MEDICINE

## 2022-01-26 PROCEDURE — 250N000012 HC RX MED GY IP 250 OP 636 PS 637: Performed by: INTERNAL MEDICINE

## 2022-01-26 PROCEDURE — 85004 AUTOMATED DIFF WBC COUNT: CPT | Performed by: INTERNAL MEDICINE

## 2022-01-26 RX ORDER — POTASSIUM CHLORIDE 1500 MG/1
20 TABLET, EXTENDED RELEASE ORAL ONCE
Status: COMPLETED | OUTPATIENT
Start: 2022-01-26 | End: 2022-01-26

## 2022-01-26 RX ORDER — MAGNESIUM OXIDE 400 MG/1
400 TABLET ORAL 2 TIMES DAILY
Status: COMPLETED | OUTPATIENT
Start: 2022-01-26 | End: 2022-01-26

## 2022-01-26 RX ORDER — ALBUTEROL SULFATE 90 UG/1
2 AEROSOL, METERED RESPIRATORY (INHALATION) EVERY 4 HOURS PRN
Status: DISCONTINUED | OUTPATIENT
Start: 2022-01-26 | End: 2022-01-28 | Stop reason: HOSPADM

## 2022-01-26 RX ORDER — FUROSEMIDE 10 MG/ML
20 INJECTION INTRAMUSCULAR; INTRAVENOUS ONCE
Status: COMPLETED | OUTPATIENT
Start: 2022-01-26 | End: 2022-01-26

## 2022-01-26 RX ORDER — HYDRALAZINE HYDROCHLORIDE 20 MG/ML
10 INJECTION INTRAMUSCULAR; INTRAVENOUS EVERY 6 HOURS PRN
Status: DISCONTINUED | OUTPATIENT
Start: 2022-01-26 | End: 2022-01-28 | Stop reason: HOSPADM

## 2022-01-26 RX ORDER — HYDRALAZINE HYDROCHLORIDE 25 MG/1
25 TABLET, FILM COATED ORAL EVERY 6 HOURS PRN
Status: DISCONTINUED | OUTPATIENT
Start: 2022-01-26 | End: 2022-01-28 | Stop reason: HOSPADM

## 2022-01-26 RX ORDER — MAGNESIUM SULFATE HEPTAHYDRATE 40 MG/ML
2 INJECTION, SOLUTION INTRAVENOUS ONCE
Status: DISCONTINUED | OUTPATIENT
Start: 2022-01-26 | End: 2022-01-26

## 2022-01-26 RX ADMIN — Medication 1 TABLET: at 09:18

## 2022-01-26 RX ADMIN — PANTOPRAZOLE SODIUM 40 MG: 40 TABLET, DELAYED RELEASE ORAL at 09:18

## 2022-01-26 RX ADMIN — SIMVASTATIN 40 MG: 40 TABLET, FILM COATED ORAL at 20:48

## 2022-01-26 RX ADMIN — FOLIC ACID 1 MG: 1 TABLET ORAL at 09:17

## 2022-01-26 RX ADMIN — CLONIDINE HYDROCHLORIDE 0.2 MG: 0.1 TABLET ORAL at 09:17

## 2022-01-26 RX ADMIN — SERTRALINE HYDROCHLORIDE 150 MG: 50 TABLET ORAL at 09:18

## 2022-01-26 RX ADMIN — MAGNESIUM OXIDE TAB 400 MG (241.3 MG ELEMENTAL MG) 400 MG: 400 (241.3 MG) TAB at 11:25

## 2022-01-26 RX ADMIN — IPRATROPIUM BROMIDE AND ALBUTEROL 1 PUFF: 20; 100 SPRAY, METERED RESPIRATORY (INHALATION) at 09:23

## 2022-01-26 RX ADMIN — IPRATROPIUM BROMIDE AND ALBUTEROL 1 PUFF: 20; 100 SPRAY, METERED RESPIRATORY (INHALATION) at 12:25

## 2022-01-26 RX ADMIN — AMLODIPINE BESYLATE 10 MG: 5 TABLET ORAL at 09:18

## 2022-01-26 RX ADMIN — POTASSIUM CHLORIDE 20 MEQ: 1500 TABLET, EXTENDED RELEASE ORAL at 09:18

## 2022-01-26 RX ADMIN — MAGNESIUM OXIDE TAB 400 MG (241.3 MG ELEMENTAL MG) 400 MG: 400 (241.3 MG) TAB at 20:48

## 2022-01-26 RX ADMIN — ATENOLOL 50 MG: 25 TABLET ORAL at 09:18

## 2022-01-26 RX ADMIN — IPRATROPIUM BROMIDE AND ALBUTEROL 1 PUFF: 20; 100 SPRAY, METERED RESPIRATORY (INHALATION) at 20:47

## 2022-01-26 RX ADMIN — FUROSEMIDE 20 MG: 10 INJECTION, SOLUTION INTRAMUSCULAR; INTRAVENOUS at 11:25

## 2022-01-26 RX ADMIN — PREDNISONE 20 MG: 20 TABLET ORAL at 09:18

## 2022-01-26 RX ADMIN — POLYETHYLENE GLYCOL 3350 17 G: 17 POWDER, FOR SOLUTION ORAL at 09:19

## 2022-01-26 RX ADMIN — OLANZAPINE 15 MG: 10 TABLET, FILM COATED ORAL at 20:48

## 2022-01-26 RX ADMIN — CLONIDINE HYDROCHLORIDE 0.2 MG: 0.1 TABLET ORAL at 20:48

## 2022-01-26 ASSESSMENT — ACTIVITIES OF DAILY LIVING (ADL)
ADLS_ACUITY_SCORE: 10
ADLS_ACUITY_SCORE: 15
ADLS_ACUITY_SCORE: 14
ADLS_ACUITY_SCORE: 14
ADLS_ACUITY_SCORE: 10
ADLS_ACUITY_SCORE: 14
ADLS_ACUITY_SCORE: 10
ADLS_ACUITY_SCORE: 14
ADLS_ACUITY_SCORE: 14
ADLS_ACUITY_SCORE: 15
ADLS_ACUITY_SCORE: 14
ADLS_ACUITY_SCORE: 15
ADLS_ACUITY_SCORE: 15
ADLS_ACUITY_SCORE: 10
ADLS_ACUITY_SCORE: 14

## 2022-01-26 NOTE — PLAN OF CARE
Patient Name: Geovanna Huff   MRN: [id number]   Admit Date: 1/8/2022    Current Infection Status: COVID-19   Current Isolation Status: Special Precautions-COVID-19     Review of COVID-19 status and required isolation precautions    Refer to Special Precautions Duration and Period of Transmissibility Guideline, in Policy Tech.        Involved parties: Karyna Gama and Primary MD Dr GERMAN.    Criteria used to make decision: Symptoms Dates: December 28 and then lab COVID + 1/8/22.    The involved parties have reviewed this patient's chart per the Special Precautions Duration and Period of Transmissibility guideline and have taken the following action:     DECISION - OPTION 1: Patient meets all the criteria for Special Precautions isolation discontinuation and this writer will resolve the COVID-19 infection flag and isolation status, due to: Immunocompetent Symptomatic: Severe or Critical, MIS-C (Admit PCR+): At least 20 days since symptoms appeared AND greater than or equal to 24hrs since last fever (without fever-reducing meds) AND COVID-19 symptoms have substantially improved. .       Karyna Gama

## 2022-01-26 NOTE — PROGRESS NOTES
Daily Progress Note        CODE STATUS:  Full Code    01/26/22  Assessment/Plan:  Geovanna Huff is a 66 year old old female with past medical history of HTN, type II DM, tobacco abuse who was admitted to the hospital on 1/8/2022 with acute respiratory failure with hypoxia secondary to COVID-19 infection.   Symptoms started on December 28, tested positive on January 8, Unclear vaccination status.  Transferred to ICU 1/10.    Patient was intubated from January 14 to 17.  Transferred out of ICU 1/17 .     Acute respiratory failure with hypoxia secondary to COVID-19 infection, ARDS;  --Completed course of remdesivir and Decadron.  --Extubated on 1/17.  Currently on room air.  Incentive spirometry, ambulate as able  --Was on twice daily subcu Lovenox for DVT prophylaxis but currently on hold due to right arm hematoma requiring blood transfusion.  --H&P reported onset of symptoms 12/28.  Discussed with infection control prevention department, Karyna Gama reported patient can be considered Covid recovered.     Acute COPD exacerbation; improved  --Was on Decadron on January 8-14, received Solu-Medrol, started January 14-18.  Taper prednisone as ordered.  --Completed course of antibiotics with Rocephin for possible CAP.  Needed for community-acquired pneumonia with ceftriaxone, finished the treatment course  --Continue inhaler.  Incentive spirometry.    Acute on chronic anemia;  Right arm hematoma/ecchymosis;  She has history of alcohol abuse and underlying chronic anemia hemoglobin.  Hemoglobin dropped to 6.5 on 1/24, received 1 unit packed RBC transfusion.  Hemoglobin around 8 range.  Monitor hemoglobin closely.  --Check B12, folic acid, iron study  --Thrombocytopenia, platelet count normal on 119, check today 122.  Patient did received Lovenox which currently on hold.  Check CBC with differential, recheck platelet count tomorrow, if trending down then will consider hematology consult     Right upper extremity  superficial thrombophlebitis;  -- US RUE on 1/23/2022 showed occlusive thrombus in the basilic  vein consistent with superficial thrombophlebitis also there is complex fluid collection without internal vascularity which could represent hematoma.  Likely from previous IV line/lab drawn  --Reviewed imaging with, surgeon, Dr Robertson, no indication for hematoma evacuation recommended compression and ice.  This was discussed with patient's nurse  --Monitor hemoglobin closely    Essential hypertension; uncontrolled  --Resumed home amlodipine, atenolol, clonidine.  Added as needed hydralazine.  Monitor vitals and adjust medications accordingly     Type II DM;  --A1c 4.8 on 1/8/2022, likely falsely low given low hemoglobin  --Hold home Metformin while inpatient  --Continue small dose Lantus.    --Insulin sliding scale and hypoglycemic protocol     GERD;  Previous ulceration with alcohol abuse, on PPI     Severe malnutrition  Secondary to acute illness, she was noted to have weight loss, dietitian is following.     Tobacco abuse  On nicotine patch    Physical deconditioning due to medical illness;   --PT, OT      Disposition; pending  Barrier to discharge; COVID-19 pneumonia, anemia, thrombocytopenia     LOS: 18 days     Subjective:  Interval History: Patient is new to me.  Patient seen and examined. Notes, labs, imaging reports personally reviewed.  Patient reported breathing continued to improve.  Denied cough or chest pain.  Denied abdomen pain, nausea, vomiting.  Reported some soreness on her right arm around hematoma site.  However, denied tingling numbness on her right arm/hand/fingers.  Discussed with nursing staff.  Discussed with infection control team.  Discussed with care manager/    Review of Systems:   As mentioned in subjective.    Patient Active Problem List   Diagnosis     Lactic acidosis     Alcohol withdrawal, with unspecified complication (H)     Abnormal CT of the abdomen     Erosive gastritis      Esophageal ulcer     Falling episodes     Type 2 diabetes mellitus, with long-term current use of insulin (H)     Essential hypertension     Cough     Shortness of breath     Hypoxia     Acute kidney injury (H)     Elevated d-dimer     Infection due to 2019 novel coronavirus     Nonspecific elevation of levels of transaminase and lactic acid dehydrogenase (LDH)       Scheduled Meds:    amLODIPine  10 mg Oral Daily     [Held by provider] aspirin  81 mg Oral or NG Tube Daily     atenolol  50 mg Oral Daily     cloNIDine  0.2 mg Oral BID     enoxaparin ANTICOAGULANT  40 mg Subcutaneous Q24H     folic acid  1 mg Oral Daily     insulin aspart  1-7 Units Subcutaneous TID AC     insulin aspart   Subcutaneous TID w/meals     insulin glargine  8 Units Subcutaneous At Bedtime     ipratropium-albuterol  1 puff Inhalation 4x daily     multivitamin w/minerals  1 tablet Oral or Feeding Tube Daily     OLANZapine  15 mg Oral or Feeding Tube At Bedtime     pantoprazole  40 mg Oral QAM AC     polyethylene glycol  17 g Oral Daily     predniSONE  20 mg Oral Daily    Followed by     [START ON 1/28/2022] predniSONE  10 mg Oral Daily     sertraline  150 mg Oral or Feeding Tube Daily     simvastatin  40 mg Oral or Feeding Tube At Bedtime     sodium chloride (PF)  10-40 mL Intracatheter Q7 Days     Continuous Infusions:    dextrose       - MEDICATION INSTRUCTIONS -       PRN Meds:.acetaminophen **OR** acetaminophen, albuterol, dextrose, glucose **OR** dextrose **OR** glucagon, flumazenil, OLANZapine zydis **OR** haloperidol lactate, hydrALAZINE, lidocaine 4%, lidocaine (buffered or not buffered), - MEDICATION INSTRUCTIONS -, melatonin, naloxone **OR** naloxone **OR** naloxone **OR** naloxone, ondansetron **OR** ondansetron, polyethylene glycol, prochlorperazine **OR** prochlorperazine **OR** prochlorperazine, senna-docusate, sodium chloride (PF), sodium chloride (PF), sodium chloride (PF)    Objective:  Vital signs in last 24  hours:  Temp:  [97.5  F (36.4  C)-98.3  F (36.8  C)] 98.2  F (36.8  C)  Pulse:  [59-66] 59  Resp:  [16-20] 18  BP: (136-179)/(72-88) 179/83  SpO2:  [93 %-96 %] 94 %      Intake/Output Summary (Last 24 hours) at 1/26/2022 0936  Last data filed at 1/26/2022 0933  Gross per 24 hour   Intake 1660 ml   Output 1600 ml   Net 60 ml       Physical Exam:  General: Not in obvious distress.  HEENT: Normocephalic, supple neck  Chest: Clear to auscultation bilateral anteriorly, no wheezing  Heart: S1S2 normal, regular  Abdomen: Soft. NT, ND. Bowel sounds- active.  Extremities: No legs swelling  Skin; noticed ecchymosis mostly along the medial aspect of right upper extremity from wrist to upper arm, slight increase in temperature but no significant tenderness, radial pulse intact  Neuro: alert and awake, moves all extremities but has generalized motor weakness      Lab Results:(I have personally reviewed the results)    Recent Results (from the past 24 hour(s))   Glucose by meter    Collection Time: 01/25/22 12:33 PM   Result Value Ref Range    GLUCOSE BY METER POCT 111 (H) 70 - 99 mg/dL   Glucose by meter    Collection Time: 01/25/22  4:32 PM   Result Value Ref Range    GLUCOSE BY METER POCT 108 (H) 70 - 99 mg/dL   Hemoglobin    Collection Time: 01/25/22  5:52 PM   Result Value Ref Range    Hemoglobin 8.3 (L) 11.7 - 15.7 g/dL   Glucose by meter    Collection Time: 01/25/22  8:57 PM   Result Value Ref Range    GLUCOSE BY METER POCT 148 (H) 70 - 99 mg/dL   Glucose by meter    Collection Time: 01/26/22  3:41 AM   Result Value Ref Range    GLUCOSE BY METER POCT 92 70 - 99 mg/dL   Hemoglobin    Collection Time: 01/26/22  5:18 AM   Result Value Ref Range    Hemoglobin 8.1 (L) 11.7 - 15.7 g/dL   Potassium    Collection Time: 01/26/22  5:18 AM   Result Value Ref Range    Potassium 3.8 3.5 - 5.0 mmol/L   Magnesium    Collection Time: 01/26/22  5:18 AM   Result Value Ref Range    Magnesium 1.9 1.8 - 2.6 mg/dL   Platelet count    Collection  Time: 01/26/22  5:18 AM   Result Value Ref Range    Platelet Count 122 (L) 150 - 450 10e3/uL   Glucose by meter    Collection Time: 01/26/22  8:18 AM   Result Value Ref Range    GLUCOSE BY METER POCT 79 70 - 99 mg/dL         Serum Glucose range:   Recent Labs   Lab 01/26/22  0818 01/26/22  0341 01/25/22  2057 01/25/22  1632   GLC 79 92 148* 108*     ABG: No lab results found in last 7 days.  CBC:   Recent Labs   Lab 01/26/22  0518 01/25/22  1752 01/25/22  0604   HGB 8.1* 8.3* 7.8*   *  --   --      Chemistry:   Recent Labs   Lab 01/26/22  0518 01/25/22  0604 01/24/22  0636 01/23/22  0546 01/22/22  0454   POTASSIUM 3.8 3.7 4.0   < > 3.8   CR  --   --  0.91  --   --    GFRESTIMATED  --   --  69  --   --    MAG 1.9  --  1.8  --  1.7*    < > = values in this interval not displayed.     Coags:  No results for input(s): INR, PROTIME, PTT in the last 168 hours.    Invalid input(s): APTT  Cardiac Markers:  No results for input(s): CKTOTAL, TROPONINI in the last 168 hours.     XR Chest 1 View    Result Date: 1/16/2022  EXAM: XR CHEST 1 VIEW LOCATION: [facility] DATE/TIME: 1/16/2022 5:04 AM INDICATION: Location of ET tube COMPARISON: None.     IMPRESSION: Low endotracheal tube terminates at 1.7 cm from the soy, consider 3 to 4 cm retraction. Enteric tube terminates below diaphragm and field of view. Left PICC line with tip over SVC. Improved aeration with mild residual right basilar opacity. No pneumothorax. Cardiomediastinal silhouette within normal limits.    US Upper Extremity Venous Duplex Right    Result Date: 1/23/2022  EXAM: US UPPER EXTREMITY VENOUS DUPLEX RIGHT LOCATION: [facility] DATE/TIME: 1/23/2022 6:31 AM INDICATION: right ARM pain, bruising, redness COMPARISON: None. TECHNIQUE: Venous Duplex ultrasound of the right upper extremity with (when possible) and without compression, augmentation, and duplex. Color flow and spectral Doppler with waveform  analysis performed. FINDINGS: Ultrasound includes evaluation of the internal jugular vein, innominate vein, subclavian vein, axillary vein, and brachial vein. The superficial cephalic and basilic veins were also evaluated where seen. RIGHT: No deep venous thrombosis. There is a short segment of occlusive thrombus in the basilic vein in the distal aspect of the upper arm just proximal to the antecubital fossa. There is a large oval soft tissue fluid collection with internal debris and no internal vascularity measuring 9.6 x 2.1 x 3.6 cm.     IMPRESSION: 1.  Short segment occlusive superficial thrombophlebitis in the basilic vein just proximal to the antecubital fossa. 2.  Large complex fluid collection in the upper arm without internal vascularity may represent a hematoma, though stability cannot be assessed by ultrasound.    XR Chest Port 1 View    Result Date: 1/14/2022  EXAM: XR CHEST PORT 1 VIEW LOCATION: [facility] DATE/TIME: 1/14/2022 6:52 PM INDICATION: Endotracheal tube positioning COMPARISON: 1/10/2022     IMPRESSION: ET tube in good position with tip 2.4 cm above soy. NG tube stents in the stomach. Left PICC line in good position with tip low SVC. Patchy mixed interstitial and groundglass infiltrates present bilaterally consistent with pneumonia. Borderline cardiomegaly.    XR Chest Port 1 View    Result Date: 1/10/2022  EXAM: XR CHEST PORT 1 VIEW LOCATION: [facility] DATE/TIME: 1/10/2022 10:37 AM INDICATION: worsening hypoxia COMPARISON: Chest x-ray 05/21/2019 and CT chest 01/08/2022     IMPRESSION: Mild to moderate patchy bilateral multilobar airspace disease, most pronounced within the right upper lobe and medial lung bases and mildly progressed since the CT from 01/08/2022. No definite pleural effusion. Stable heart size.    CT Chest Pulmonary Embolism w Contrast    Result Date: 1/8/2022  EXAM: CT CHEST PULMONARY EMBOLISM W CONTRAST LOCATION: [facility] DATE/TIME: 1/8/2022 5:43 PM INDICATION: Difficulty breathing. Weakness. Patient reports recent positive Covid 19 test. COMPARISON: None. TECHNIQUE: CT chest pulmonary angiogram during arterial phase injection of IV contrast. Multiplanar reformats and MIP reconstructions were performed. Dose reduction techniques were used. CONTRAST: [id number] 75ml FINDINGS: ANGIOGRAM CHEST: No pulmonary embolus. No aortic dissection or aneurysm. LUNGS AND PLEURA: Moderate patchy groundglass infiltrate throughout both lungs worst in the right upper lobe. Mild bibasilar atelectasis. No pneumothorax. MEDIASTINUM/AXILLAE: Small hiatal hernia. CORONARY ARTERY CALCIFICATION: Moderate. UPPER ABDOMEN: Normal. MUSCULOSKELETAL: Degenerative changes of the spine.     IMPRESSION: 1.  Moderate bilateral pulmonary infiltrates consistent with COVID 19 pneumonitis. 2.  No pulmonary embolus. 3.  Coronary artery disease.    Latest radiology report personally reviewed.      The total time spent is 38 minutes, >50% time spent in coordination of care data gathering, charting, record review, discussion of test/medications/plan of care as mentioned above and also disposition plan with patient/family. D/w consultants, nursing staffs and /.    Note created using dragon voice recognition software so sounds alike errors may have escaped editing.    01/26/2022   CHRISTIAN SINHA MD  HOSPITALIST, [facility]  PAGER NO. [phone]

## 2022-01-26 NOTE — PLAN OF CARE
Problem: Gas Exchange Impaired  Goal: Optimal Gas Exchange  Outcome: No Change  Intervention: Optimize Oxygenation and Ventilation  Recent Flowsheet Documentation  Taken 1/26/2022 0243 by Jason Rodríguez, RN  Head of Bed (HOB) Positioning: HOB at 20-30 degrees     Pt denied and offered no c/o pain this shift. Hematoma to RUE did not permeate marked lines.  Neurovascular assessment to RUE: WDL. Continued on RA at 93%. Absent of dyspnea w/ exertion. LS: diminished but absent of adventitious sounds. Triple lumen PICC to LUE remained patent. VSS: Lovenox continues to be on hold. SBA w/ FWW to restroom. Pure-wick used overnight, adequate urine output noted. A&Ox4.

## 2022-01-26 NOTE — PROGRESS NOTES
11:40AM - CHW received delegation from Darlene Cavanaugh RN CM, to follow up on TCU referrals.    Madan Mcknight - Left message on admissions voicemail to call back to [phone].    Madan Amaya - Spoke to Dominique in admissions. Declined referral due to chemical dependency and tobacco use. (declined)    Madan White Smith - Spoke with Kirstin. She will re-review referral and call back to [phone].    Forney Good Jayson - Left message on admissions voicemail to call back to [phone].    Terri Gonzalez - Left message on Michelle's voicemail to call back to [phone],    [facility] - Per admissions answering machine, admissions coordinator is out this week. Called Kelsie at [phone]. Left message to call back to [phone]    Updated destination comments on Care Management tab.    JUDD Jones  Inpatient Community Health Worker  [facility], P2

## 2022-01-26 NOTE — PLAN OF CARE
Pt is now covid recovered. O2 sats >90% on RA. Continuous pulse oximetry in place.  New order to monitor for black/dark stools. No reports of black/dark stool this shift.  K+ and mg protocols in place. Pt started on magnesium oxide BID. She also received a PO dose of potassium. Re-check K+ level tomorrow am.  Strict I&O.  No reports of pain.  Pt received a one time dose of IV lasix. Pt up  To bathroom to void frequently afterwards. Pt needs encouragement to use the toilet vs. purewick.  Awaiting TCU placement.

## 2022-01-27 ENCOUNTER — APPOINTMENT (OUTPATIENT)
Dept: OCCUPATIONAL THERAPY | Facility: HOSPITAL | Age: 67
DRG: 208 | End: 2022-01-27
Payer: COMMERCIAL

## 2022-01-27 LAB
APTT PPP: 29 SECONDS (ref 22–38)
FIBRINOGEN PPP-MCNC: 276 MG/DL (ref 170–490)
GLUCOSE BLDC GLUCOMTR-MCNC: 108 MG/DL (ref 70–99)
GLUCOSE BLDC GLUCOMTR-MCNC: 143 MG/DL (ref 70–99)
GLUCOSE BLDC GLUCOMTR-MCNC: 82 MG/DL (ref 70–99)
GLUCOSE BLDC GLUCOMTR-MCNC: 89 MG/DL (ref 70–99)
HGB BLD-MCNC: 8.1 G/DL (ref 11.7–15.7)
INR PPP: 0.93 (ref 0.9–1.15)
PLATELET # BLD AUTO: 115 10E3/UL (ref 150–450)
POTASSIUM BLD-SCNC: 3.7 MMOL/L (ref 3.5–5)

## 2022-01-27 PROCEDURE — 36415 COLL VENOUS BLD VENIPUNCTURE: CPT | Performed by: INTERNAL MEDICINE

## 2022-01-27 PROCEDURE — 250N000013 HC RX MED GY IP 250 OP 250 PS 637: Performed by: INTERNAL MEDICINE

## 2022-01-27 PROCEDURE — 85018 HEMOGLOBIN: CPT | Performed by: INTERNAL MEDICINE

## 2022-01-27 PROCEDURE — 85384 FIBRINOGEN ACTIVITY: CPT | Performed by: INTERNAL MEDICINE

## 2022-01-27 PROCEDURE — 97530 THERAPEUTIC ACTIVITIES: CPT | Mod: GO

## 2022-01-27 PROCEDURE — 99223 1ST HOSP IP/OBS HIGH 75: CPT | Performed by: INTERNAL MEDICINE

## 2022-01-27 PROCEDURE — 250N000013 HC RX MED GY IP 250 OP 250 PS 637: Performed by: FAMILY MEDICINE

## 2022-01-27 PROCEDURE — 97535 SELF CARE MNGMENT TRAINING: CPT | Mod: GO

## 2022-01-27 PROCEDURE — 120N000001 HC R&B MED SURG/OB

## 2022-01-27 PROCEDURE — 99232 SBSQ HOSP IP/OBS MODERATE 35: CPT | Performed by: INTERNAL MEDICINE

## 2022-01-27 PROCEDURE — 85610 PROTHROMBIN TIME: CPT | Performed by: INTERNAL MEDICINE

## 2022-01-27 PROCEDURE — 85730 THROMBOPLASTIN TIME PARTIAL: CPT | Performed by: INTERNAL MEDICINE

## 2022-01-27 PROCEDURE — 250N000012 HC RX MED GY IP 250 OP 636 PS 637: Performed by: INTERNAL MEDICINE

## 2022-01-27 PROCEDURE — 250N000013 HC RX MED GY IP 250 OP 250 PS 637: Performed by: HOSPITALIST

## 2022-01-27 PROCEDURE — 84132 ASSAY OF SERUM POTASSIUM: CPT | Performed by: INTERNAL MEDICINE

## 2022-01-27 PROCEDURE — 85049 AUTOMATED PLATELET COUNT: CPT | Performed by: INTERNAL MEDICINE

## 2022-01-27 RX ORDER — POTASSIUM CHLORIDE 1500 MG/1
20 TABLET, EXTENDED RELEASE ORAL ONCE
Status: COMPLETED | OUTPATIENT
Start: 2022-01-27 | End: 2022-01-27

## 2022-01-27 RX ORDER — CALCIUM CARBONATE/VITAMIN D3 600 MG-10
500 TABLET ORAL DAILY
Status: DISCONTINUED | OUTPATIENT
Start: 2022-01-27 | End: 2022-01-28 | Stop reason: HOSPADM

## 2022-01-27 RX ADMIN — IPRATROPIUM BROMIDE AND ALBUTEROL 1 PUFF: 20; 100 SPRAY, METERED RESPIRATORY (INHALATION) at 16:11

## 2022-01-27 RX ADMIN — OLANZAPINE 15 MG: 10 TABLET, FILM COATED ORAL at 20:56

## 2022-01-27 RX ADMIN — IPRATROPIUM BROMIDE AND ALBUTEROL 1 PUFF: 20; 100 SPRAY, METERED RESPIRATORY (INHALATION) at 20:59

## 2022-01-27 RX ADMIN — IPRATROPIUM BROMIDE AND ALBUTEROL 1 PUFF: 20; 100 SPRAY, METERED RESPIRATORY (INHALATION) at 12:25

## 2022-01-27 RX ADMIN — FOLIC ACID 1 MG: 1 TABLET ORAL at 09:29

## 2022-01-27 RX ADMIN — Medication 500 MCG: at 10:31

## 2022-01-27 RX ADMIN — IPRATROPIUM BROMIDE AND ALBUTEROL 1 PUFF: 20; 100 SPRAY, METERED RESPIRATORY (INHALATION) at 09:30

## 2022-01-27 RX ADMIN — CLONIDINE HYDROCHLORIDE 0.2 MG: 0.1 TABLET ORAL at 20:55

## 2022-01-27 RX ADMIN — POTASSIUM CHLORIDE 20 MEQ: 1500 TABLET, EXTENDED RELEASE ORAL at 09:28

## 2022-01-27 RX ADMIN — AMLODIPINE BESYLATE 10 MG: 5 TABLET ORAL at 09:28

## 2022-01-27 RX ADMIN — Medication 1 TABLET: at 09:28

## 2022-01-27 RX ADMIN — PANTOPRAZOLE SODIUM 40 MG: 40 TABLET, DELAYED RELEASE ORAL at 09:28

## 2022-01-27 RX ADMIN — SIMVASTATIN 40 MG: 40 TABLET, FILM COATED ORAL at 20:55

## 2022-01-27 RX ADMIN — SERTRALINE HYDROCHLORIDE 150 MG: 50 TABLET ORAL at 09:28

## 2022-01-27 RX ADMIN — CLONIDINE HYDROCHLORIDE 0.2 MG: 0.1 TABLET ORAL at 09:28

## 2022-01-27 RX ADMIN — POLYETHYLENE GLYCOL 3350 17 G: 17 POWDER, FOR SOLUTION ORAL at 09:27

## 2022-01-27 RX ADMIN — ATENOLOL 50 MG: 25 TABLET ORAL at 09:28

## 2022-01-27 RX ADMIN — HYDRALAZINE HYDROCHLORIDE 25 MG: 25 TABLET, FILM COATED ORAL at 16:11

## 2022-01-27 RX ADMIN — PREDNISONE 20 MG: 20 TABLET ORAL at 09:28

## 2022-01-27 ASSESSMENT — ACTIVITIES OF DAILY LIVING (ADL)
ADLS_ACUITY_SCORE: 14
ADLS_ACUITY_SCORE: 10
ADLS_ACUITY_SCORE: 14
ADLS_ACUITY_SCORE: 8
ADLS_ACUITY_SCORE: 8
ADLS_ACUITY_SCORE: 14
ADLS_ACUITY_SCORE: 14
ADLS_ACUITY_SCORE: 15
ADLS_ACUITY_SCORE: 8
ADLS_ACUITY_SCORE: 15
ADLS_ACUITY_SCORE: 8
ADLS_ACUITY_SCORE: 8
ADLS_ACUITY_SCORE: 10
ADLS_ACUITY_SCORE: 8
ADLS_ACUITY_SCORE: 14
ADLS_ACUITY_SCORE: 15
ADLS_ACUITY_SCORE: 14
ADLS_ACUITY_SCORE: 15
ADLS_ACUITY_SCORE: 14
ADLS_ACUITY_SCORE: 10
ADLS_ACUITY_SCORE: 8
ADLS_ACUITY_SCORE: 14

## 2022-01-27 NOTE — PROGRESS NOTES
2:20PM - CHW received delegation from Darlene Cavanaugh RN CM, to meet with patient for discharge planning. Patient is set to discharge tomorrow, home with home care services.    CHW met with patient to discuss plans for discharge tomorrow. She will have her boyfriend to assist with transporting to home between 1:00pm-1:30pm.    JUDD Jones  Inpatient Community Health Worker  [facility], P2

## 2022-01-27 NOTE — PROGRESS NOTES
Daily Progress Note        CODE STATUS:  Full Code    01/26/22  Assessment/Plan:  Geovanna Huff is a 66 year old old female with past medical history of HTN, type II DM, tobacco abuse who was admitted to the hospital on 1/8/2022 with acute respiratory failure with hypoxia secondary to COVID-19 infection.   Symptoms started on December 28, tested positive on January 8, Unclear vaccination status.  Transferred to ICU 1/10.    Patient was intubated from January 14 to 17.  Transferred out of ICU 1/17 .     Acute respiratory failure with hypoxia secondary to COVID-19 infection, ARDS;  --Completed course of remdesivir and Decadron.  --Extubated on 1/17.  Currently on room air.  Incentive spirometry, ambulate as able  --Was on twice daily subcu Lovenox for DVT prophylaxis but currently on hold due to right arm hematoma requiring blood transfusion.  --H&P reported onset of symptoms 12/28.  Discussed with infection control prevention department, Karyna Gama, now Covid recovered.     Acute COPD exacerbation; improved  --Was on Decadron on January 8-14, received Solu-Medrol, started January 14-18.  Taper prednisone as ordered.  --Completed course of antibiotics with Rocephin for possible CAP.  --Continue inhaler.  Incentive spirometry.    Acute on chronic anemia;  Right arm hematoma/ecchymosis;  She has history of alcohol abuse and underlying chronic anemia hemoglobin.   -- US RUE on 1/23/2022 showed occlusive thrombus in the basilic  vein consistent with superficial thrombophlebitis also there is complex fluid collection without internal vascularity which could represent hematoma.  Likely from previous IV line/lab drawn  --Reviewed imaging with, surgeon, Dr Robertson, no indication for hematoma evacuation recommended compression and ice.   Hemoglobin dropped to 6.5 on 1/24, received 1 unit packed RBC transfusion.  Serial hemoglobin fairly stable.  --Normal folic acid, normal iron study.  Low normal B12, initiated on  supplement.  --Thrombocytopenia.  Discussed with hematologist, Dr. Kramer, no further work-up at this time.  And also do not recommend restarting anticoagulation for DVT prophylaxis given due to risk of increasing bleeding and patient clinically improving/ambulating    Essential hypertension; uncontrolled-improving  --Resumed home amlodipine, atenolol, clonidine.  Added as needed hydralazine.  Monitor vitals and adjust medications accordingly     Type II DM;  --A1c 4.8 on 1/8/2022, likely falsely low given low hemoglobin  --Hold home Metformin while inpatient  --Insulin sliding scale and hypoglycemic protocol     GERD;  Previous ulceration with alcohol abuse, on PPI     Severe malnutrition  Secondary to acute illness, she was noted to have weight loss, dietitian is following.     Tobacco abuse  On nicotine patch    Physical deconditioning due to medical illness;   --Improving.    Disposition; anticipate TCU  Barrier to discharge; placement     LOS: 18 days     Subjective:  Interval History: Patient seen and examined. Notes, labs, imaging reports personally reviewed.  Patient denied feeling short of breath chest pain, cough or congestion.  Denied abdomen pain, nausea, vomiting.  Denied fever or chills.  Reported right arm bruise improving and pain is getting better.  Discussed with nursing staffs, removed PICC line.  Discussed with hematologist.    Review of Systems:   As mentioned in subjective.    Patient Active Problem List   Diagnosis     Lactic acidosis     Alcohol withdrawal, with unspecified complication (H)     Abnormal CT of the abdomen     Erosive gastritis     Esophageal ulcer     Falling episodes     Type 2 diabetes mellitus, with long-term current use of insulin (H)     Essential hypertension     Cough     Shortness of breath     Hypoxia     Acute kidney injury (H)     Elevated d-dimer     Infection due to 2019 novel coronavirus     Nonspecific elevation of levels of transaminase and lactic acid  dehydrogenase (LDH)       Scheduled Meds:    amLODIPine  10 mg Oral Daily     [Held by provider] aspirin  81 mg Oral or NG Tube Daily     atenolol  50 mg Oral Daily     cloNIDine  0.2 mg Oral BID     [Held by provider] enoxaparin ANTICOAGULANT  40 mg Subcutaneous Q24H     folic acid  1 mg Oral Daily     insulin aspart  1-7 Units Subcutaneous TID AC     insulin aspart   Subcutaneous TID w/meals     ipratropium-albuterol  1 puff Inhalation 4x daily     multivitamin w/minerals  1 tablet Oral or Feeding Tube Daily     OLANZapine  15 mg Oral or Feeding Tube At Bedtime     pantoprazole  40 mg Oral QAM AC     polyethylene glycol  17 g Oral Daily     [START ON 1/28/2022] predniSONE  10 mg Oral Daily     sertraline  150 mg Oral or Feeding Tube Daily     simvastatin  40 mg Oral or Feeding Tube At Bedtime     sodium chloride (PF)  10-40 mL Intracatheter Q7 Days     cyanocobalamin  500 mcg Oral Daily     Continuous Infusions:    dextrose       - MEDICATION INSTRUCTIONS -       PRN Meds:.acetaminophen **OR** acetaminophen, albuterol, dextrose, glucose **OR** dextrose **OR** glucagon, flumazenil, hydrALAZINE, hydrALAZINE, lidocaine 4%, lidocaine (buffered or not buffered), - MEDICATION INSTRUCTIONS -, melatonin, naloxone **OR** naloxone **OR** naloxone **OR** naloxone, polyethylene glycol, prochlorperazine **OR** prochlorperazine **OR** prochlorperazine, senna-docusate, sodium chloride (PF), sodium chloride (PF), sodium chloride (PF)    Objective:  Vital signs in last 24 hours:  Temp:  [98.1  F (36.7  C)-98.7  F (37.1  C)] 98.1  F (36.7  C)  Pulse:  [54-64] 56  Resp:  [17-20] 20  BP: (134-186)/(60-79) 134/65  SpO2:  [91 %-94 %] 91 %      Intake/Output Summary (Last 24 hours) at 1/26/2022 0936  Last data filed at 1/26/2022 0933  Gross per 24 hour   Intake 1660 ml   Output 1600 ml   Net 60 ml       Physical Exam:  General: Not in obvious distress.  HEENT: Normocephalic, supple neck  Chest: Clear to auscultation bilateral  anteriorly, no wheezing  Heart: S1S2 normal, regular  Abdomen: Soft. NT, ND. Bowel sounds- active.  Extremities: No legs swelling  Skin; noticed ecchymosis mostly along the medial aspect of right upper extremity from wrist to upper arm which seems a stable to improving, radial pulse intact  Neuro: alert and awake, moves all extremities      Lab Results:(I have personally reviewed the results)    Recent Results (from the past 24 hour(s))   Glucose by meter    Collection Time: 01/26/22  5:25 PM   Result Value Ref Range    GLUCOSE BY METER POCT 139 (H) 70 - 99 mg/dL   Glucose by meter    Collection Time: 01/26/22  9:50 PM   Result Value Ref Range    GLUCOSE BY METER POCT 156 (H) 70 - 99 mg/dL   Potassium    Collection Time: 01/27/22  5:14 AM   Result Value Ref Range    Potassium 3.7 3.5 - 5.0 mmol/L   Hemoglobin    Collection Time: 01/27/22  5:14 AM   Result Value Ref Range    Hemoglobin 8.1 (L) 11.7 - 15.7 g/dL   Platelet count    Collection Time: 01/27/22  5:14 AM   Result Value Ref Range    Platelet Count 115 (L) 150 - 450 10e3/uL   Glucose by meter    Collection Time: 01/27/22  5:27 AM   Result Value Ref Range    GLUCOSE BY METER POCT 82 70 - 99 mg/dL   Glucose by meter    Collection Time: 01/27/22  8:38 AM   Result Value Ref Range    GLUCOSE BY METER POCT 89 70 - 99 mg/dL   Glucose by meter    Collection Time: 01/27/22 12:11 PM   Result Value Ref Range    GLUCOSE BY METER POCT 108 (H) 70 - 99 mg/dL         Serum Glucose range:   Recent Labs   Lab 01/27/22  1211 01/27/22  0838 01/27/22  0527 01/26/22  2150   * 89 82 156*     ABG: No lab results found in last 7 days.  CBC:   Recent Labs   Lab 01/27/22  0514 01/26/22  0518 01/25/22  1752   WBC  --  4.8  --    HGB 8.1* 8.1*  8.1* 8.3*   HCT  --  25.3*  --    MCV  --  108*  --    * 122*  --    NEUTROPHIL  --  57  --    LYMPH  --  31  --    MONOCYTE  --  6  --    EOSINOPHIL  --  5  --      Chemistry:   Recent Labs   Lab 01/27/22  0514 01/26/22  0518  01/25/22  0604 01/24/22  0636 01/23/22  0546 01/22/22  0454   POTASSIUM 3.7 3.8 3.7 4.0   < > 3.8   CR  --  1.03  --  0.91  --   --    GFRESTIMATED  --  60*  --  69  --   --    MAG  --  1.9  --  1.8  --  1.7*    < > = values in this interval not displayed.     Coags:  No results for input(s): INR, PROTIME, PTT in the last 168 hours.    Invalid input(s): APTT  Cardiac Markers:  No results for input(s): CKTOTAL, TROPONINI in the last 168 hours.     XR Chest 1 View    Result Date: 1/16/2022  EXAM: XR CHEST 1 VIEW LOCATION: [facility] DATE/TIME: 1/16/2022 5:04 AM INDICATION: Location of ET tube COMPARISON: None.     IMPRESSION: Low endotracheal tube terminates at 1.7 cm from the soy, consider 3 to 4 cm retraction. Enteric tube terminates below diaphragm and field of view. Left PICC line with tip over SVC. Improved aeration with mild residual right basilar opacity. No pneumothorax. Cardiomediastinal silhouette within normal limits.    US Upper Extremity Venous Duplex Right    Result Date: 1/23/2022  EXAM: US UPPER EXTREMITY VENOUS DUPLEX RIGHT LOCATION: [facility] DATE/TIME: 1/23/2022 6:31 AM INDICATION: right ARM pain, bruising, redness COMPARISON: None. TECHNIQUE: Venous Duplex ultrasound of the right upper extremity with (when possible) and without compression, augmentation, and duplex. Color flow and spectral Doppler with waveform analysis performed. FINDINGS: Ultrasound includes evaluation of the internal jugular vein, innominate vein, subclavian vein, axillary vein, and brachial vein. The superficial cephalic and basilic veins were also evaluated where seen. RIGHT: No deep venous thrombosis. There is a short segment of occlusive thrombus in the basilic vein in the distal aspect of the upper arm just proximal to the antecubital fossa. There is a large oval soft tissue fluid collection with internal debris and no internal vascularity measuring 9.6 x 2.1 x 3.6 cm.      IMPRESSION: 1.  Short segment occlusive superficial thrombophlebitis in the basilic vein just proximal to the antecubital fossa. 2.  Large complex fluid collection in the upper arm without internal vascularity may represent a hematoma, though stability cannot be assessed by ultrasound.    XR Chest Port 1 View    Result Date: 1/14/2022  EXAM: XR CHEST PORT 1 VIEW LOCATION: [facility] DATE/TIME: 1/14/2022 6:52 PM INDICATION: Endotracheal tube positioning COMPARISON: 1/10/2022     IMPRESSION: ET tube in good position with tip 2.4 cm above soy. NG tube stents in the stomach. Left PICC line in good position with tip low SVC. Patchy mixed interstitial and groundglass infiltrates present bilaterally consistent with pneumonia. Borderline cardiomegaly.    XR Chest Port 1 View    Result Date: 1/10/2022  EXAM: XR CHEST PORT 1 VIEW LOCATION: [facility] DATE/TIME: 1/10/2022 10:37 AM INDICATION: worsening hypoxia COMPARISON: Chest x-ray 05/21/2019 and CT chest 01/08/2022     IMPRESSION: Mild to moderate patchy bilateral multilobar airspace disease, most pronounced within the right upper lobe and medial lung bases and mildly progressed since the CT from 01/08/2022. No definite pleural effusion. Stable heart size.    CT Chest Pulmonary Embolism w Contrast    Result Date: 1/8/2022  EXAM: CT CHEST PULMONARY EMBOLISM W CONTRAST LOCATION: [facility] DATE/TIME: 1/8/2022 5:43 PM INDICATION: Difficulty breathing. Weakness. Patient reports recent positive Covid 19 test. COMPARISON: None. TECHNIQUE: CT chest pulmonary angiogram during arterial phase injection of IV contrast. Multiplanar reformats and MIP reconstructions were performed. Dose reduction techniques were used. CONTRAST: [id number] 75ml FINDINGS: ANGIOGRAM CHEST: No pulmonary embolus. No aortic dissection or aneurysm. LUNGS AND PLEURA: Moderate patchy groundglass infiltrate throughout both lungs  worst in the right upper lobe. Mild bibasilar atelectasis. No pneumothorax. MEDIASTINUM/AXILLAE: Small hiatal hernia. CORONARY ARTERY CALCIFICATION: Moderate. UPPER ABDOMEN: Normal. MUSCULOSKELETAL: Degenerative changes of the spine.     IMPRESSION: 1.  Moderate bilateral pulmonary infiltrates consistent with COVID 19 pneumonitis. 2.  No pulmonary embolus. 3.  Coronary artery disease.    Latest radiology report personally reviewed.    Note created using dragon voice recognition software so sounds alike errors may have escaped editing.    01/27/2022   CHRISTIAN SINHA MD  HOSPITALIST, [facility]  PAGER NO. [phone]

## 2022-01-27 NOTE — PROGRESS NOTES
Care Management Follow Up    Length of Stay (days): 19    Expected Discharge Date: 01/28/2022     Concerns to be Addressed: discharge planning     Patient plan of care discussed at interdisciplinary rounds: No    Anticipated Discharge Disposition: Home Care     Anticipated Discharge Services:    Anticipated Discharge DME:      Patient/family educated on Medicare website which has current facility and service quality ratings:    Education Provided on the Discharge Plan: Yes     Patient/Family in Agreement with the Plan:  Yes    Referrals Placed by CM/SW:    Private pay costs discussed: Not applicable    Additional Information:  Spoke with patient, offered home care at discharge. Patient agreeable. Will send referrals. Boyfriend to transport at discharge.       Dyana Mitchell RN

## 2022-01-27 NOTE — PLAN OF CARE
Pt removed her PICC line this morning on her own after the doctor told her it could come out.  No reports of pain.  Pt up to bathroom to urinate frequently.  Continuous pulse oximetry discontinued.  Hematology consult ordered d/t thrombocytopenia/hematoma of RUE.  Ice to right arm twice this shift.

## 2022-01-27 NOTE — PLAN OF CARE
"Patient reports feeling of weakness when walking dog at home, and is afraid to go for walks. Says \"I just can't do it anymore\". Feet and ankles fairly swollen.    No pain, infrequent productive cough, bruising on right upper arm unchanged from markings. Bruising on lower left abdomen marked. BG stable. Chair and bed alarms on. Asking for assistance with ambulating to toilet. Urinating okay.   Problem: Adult Inpatient Plan of Care  Goal: Absence of Hospital-Acquired Illness or Injury  Intervention: Identify and Manage Fall Risk  Recent Flowsheet Documentation  Taken 1/26/2022 1916 by Jordi Pruitt, RN  Safety Promotion/Fall Prevention:    activity supervised    chair alarm on   Asks for assistance with all ambulation     Problem: Gas Exchange Impaired  Goal: Optimal Gas Exchange  Outcome: Improving  Intervention: Optimize Oxygenation and Ventilation  Recent Flowsheet Documentation  Taken 1/26/2022 1916 by Jordi Pruitt, RN  Head of Bed (HOB) Positioning: HOB at 45 degrees   92% on room air. Reported no difficulty breathing when up to walk.     Problem: Risk for Delirium  Goal: Improved Attention and Thought Clarity  Outcome: Improving   Alert and oriented.     "

## 2022-01-27 NOTE — PLAN OF CARE
Problem: Gas Exchange Impaired  Goal: Optimal Gas Exchange  1/27/2022 0425 by Jason Rodríguez, RN  Outcome: No Change  1/27/2022 0345 by Jason Rodríguez, RN  Outcome: No Change  Intervention: Optimize Oxygenation and Ventilation  Recent Flowsheet Documentation  Taken 1/27/2022 0100 by Jason Rodríguez, RN  Head of Bed (HOB) Positioning: HOB at 20-30 degrees     Problem: Risk for Delirium  Goal: Improved Sleep  Outcome: Improving     Pt  continues to deny offer no c/o pain this shift. Hematoma to RUE did not permeate marked lines.  Neurovascular assessment to RUE: WDL. Continued on RA at 94-95%. Absent of dyspnea w/ exertion. LS: diminished but absent of adventitious sounds. Triple lumen PICC to LUE remained patent. VSS: Lovenox continues to be on hold. SBA w/ FWW to restroom. Intermittent incontinence of urine, wears a brief. A&Ox4.

## 2022-01-27 NOTE — CONSULTS
[facility] Hematology and Oncology Inpatient Consult Note    Patient: Geovanna Huff  MRN: [id number]  Date of Service: 1/27/2022      Reason for Visit    I was consulted by Dr. Vegas  regarding anticoagulation    Assessment/Plan    R arm hematoma  R superficial thrombophlebitis  COVID-19 infection  Mild thrombocytopenia    Patient developed right forearm hematoma in the area around superficial phlebitis while on prophylactic dose of Lovenox and aspirin.  No other bleeding is noted.    Patient denies any other bleeding history in the past.  Currently hemoglobin is stable.    Agree with withholding any anticoagulation for minimum of 2 weeks.  I think by that time the patient is likely to have recovered from COVID-19 infection and be fully ambulatory so there should be no need for consideration of prophylactic anticoagulation.    Right superficial thrombophlebitis should be treated symptomatically.    Mild thrombocytopenia likely related to the recent bleeding with no concern for HIT.    We will check coagulation tests to ensure there is no other abnormality or explanation for the bleeding.  Most likely related to combination of Lovenox and aspirin and blood draw.    Discussed with the hospitalist.    Plan: Agree with holding anticoagulation  Symptomatic treatment of superficial thrombophlebitis  Probably no need for DVT prophylaxis after discharge from the hospital  We will check coagulation parameters      ECOG Performance Status: 2            ______________________________________________________________________________      Staging History    Cancer Staging  No matching staging information was found for the patient.      History  Ms. Geovanna Huff is a 66 year old past history of schizoaffective disorder, type 2 diabetes, chronic alcohol abuse and tobacco abuse admitted January 8 with COVID-19 infection, acute hypoxic respiratory failure and COVID-19 pneumonia.  Also noted with ROSINA, macrocytic anemia,  history of alcohol abuse.    Was treated with remdesivir and Decadron.  Transferred to ICU January 10.  Intubated from the 14th through the 17th.  Transferred out of the ICU on the 17th.  Was initially on twice daily subcu Lovenox but then developed hematoma in the right arm with drop in hemoglobin requiring an unit of blood at which time Lovenox was discontinued.  Patient was on aspirin at that time.    Was diagnosed with superficial thrombophlebitis at the time of hospitalization.    None noted with slight decrease in platelet count so we are asked to see.  Denies any other bleeding symptoms.  Denies previous history of bleeding.  Reports to be feeling stronger and has been ambulating in the room today.    No previous transfusions prior to this hospitalization.      Review of systems.  No fever or night sweats.  No loss of weight.  No lumps or bumps anywhere.  No unusual headaches or eyesight issues.  No dizziness.  No bleeding from the nose.  No sores in the mouth. No problems with swallowing.  No chest pain. No shortness of breath. No cough.  No abdominal pain. No nausea or vomiting.  No diarrhea or constipation.  No blood in stool or black colored stools.  No problems passing urine.  No numbness or tingling in hands or feet.  No skin rashes.  A 14 point review of systems is otherwise negative.        Past History  Past Medical History:   Diagnosis Date     Alcohol abuse      Diabetes mellitus, type 2 (H)      Schizoaffective disorder, bipolar type (H)      Tobacco abuse      Past Surgical History:   Procedure Laterality Date     PICC TRIPLE LUMEN PLACEMENT  1/10/2022          MO ESOPHAGOGASTRODUODENOSCOPY TRANSORAL DIAGNOSTIC N/A 5/21/2019    Procedure: ESOPHAGOGASTRODUODENOSCOPY (EGD) with biopsies;  Surgeon: Zakia Sutton MD;  Location: [facility];  Service: Gastroenterology     History reviewed. No pertinent family history.  Social History     Socioeconomic History     Marital status: Single     Spouse  "name: None     Number of children: None     Years of education: None     Highest education level: None   Occupational History     None   Tobacco Use     Smoking status: Current Every Day Smoker     Packs/day: 2.00     Smokeless tobacco: Never Used   Substance and Sexual Activity     Alcohol use: Yes     Comment: Alcoholic Drinks/day: 4-5 drinks a day, 1-2 bottles of rum a week     Drug use: Not Currently     Sexual activity: None   Other Topics Concern     None   Social History Narrative    Lilves with her boyfriend.  Uses a walker     Social Determinants of Health     Financial Resource Strain: Not on file   Food Insecurity: Not on file   Transportation Needs: Not on file   Physical Activity: Not on file   Stress: Not on file   Social Connections: Not on file   Intimate Partner Violence: Not on file   Housing Stability: Not on file       Allergies    No Known Allergies       Physical Exam    BP (!) 183/84 (BP Location: Right leg)   Pulse 62   Temp 99  F (37.2  C) (Oral)   Resp 19   Ht 1.575 m (5' 2\")   Wt 73.1 kg (161 lb 1.6 oz)   SpO2 94%   BMI 29.47 kg/m      GENERAL: Alert and oriented to time place and person. Seated comfortably. In no distress.    HEAD: Atraumatic and normocephalic.    EYES: SANDEEP, EOMI.  No pallor.  No icterus.    Oral cavity: no mucosal lesion or tonsillar enlargement.    NECK: supple. JVP normal.  No thyroid enlargement.    LYMPH NODES: No palpable, cervical, axillary or inguinal lymphadenopathy.    CHEST: clear to auscultation bilaterally.  Resonant to percussion throughout bilaterally.  Symmetrical breath movements bilaterally.    CVS: S1 and S2 are heard. Regular rate and rhythm.  No murmur or gallop or rub heard.  No peripheral edema.    ABDOMEN: Soft. Not tender. Not distended.  No palpable hepatomegaly or splenomegaly.  No other mass palpable.  Bowel sounds heard.    EXTREMITIES: Warm.    SKIN: no rash, or bruising or purpura.  Has a full head of hair.    Lab Results  Recent " Results (from the past 24 hour(s))   Glucose by meter    Collection Time: 01/26/22  5:25 PM   Result Value Ref Range    GLUCOSE BY METER POCT 139 (H) 70 - 99 mg/dL   Glucose by meter    Collection Time: 01/26/22  9:50 PM   Result Value Ref Range    GLUCOSE BY METER POCT 156 (H) 70 - 99 mg/dL   Potassium    Collection Time: 01/27/22  5:14 AM   Result Value Ref Range    Potassium 3.7 3.5 - 5.0 mmol/L   Hemoglobin    Collection Time: 01/27/22  5:14 AM   Result Value Ref Range    Hemoglobin 8.1 (L) 11.7 - 15.7 g/dL   Platelet count    Collection Time: 01/27/22  5:14 AM   Result Value Ref Range    Platelet Count 115 (L) 150 - 450 10e3/uL   Glucose by meter    Collection Time: 01/27/22  5:27 AM   Result Value Ref Range    GLUCOSE BY METER POCT 82 70 - 99 mg/dL   Glucose by meter    Collection Time: 01/27/22  8:38 AM   Result Value Ref Range    GLUCOSE BY METER POCT 89 70 - 99 mg/dL   Glucose by meter    Collection Time: 01/27/22 12:11 PM   Result Value Ref Range    GLUCOSE BY METER POCT 108 (H) 70 - 99 mg/dL        Imaging Results    US Upper Extremity Venous Duplex Right    Result Date: 1/23/2022  EXAM: US UPPER EXTREMITY VENOUS DUPLEX RIGHT LOCATION: [facility] DATE/TIME: 1/23/2022 6:31 AM INDICATION: right ARM pain, bruising, redness COMPARISON: None. TECHNIQUE: Venous Duplex ultrasound of the right upper extremity with (when possible) and without compression, augmentation, and duplex. Color flow and spectral Doppler with waveform analysis performed. FINDINGS: Ultrasound includes evaluation of the internal jugular vein, innominate vein, subclavian vein, axillary vein, and brachial vein. The superficial cephalic and basilic veins were also evaluated where seen. RIGHT: No deep venous thrombosis. There is a short segment of occlusive thrombus in the basilic vein in the distal aspect of the upper arm just proximal to the antecubital fossa. There is a large oval soft tissue fluid collection with  internal debris and no internal vascularity measuring 9.6 x 2.1 x 3.6 cm.     IMPRESSION: 1.  Short segment occlusive superficial thrombophlebitis in the basilic vein just proximal to the antecubital fossa. 2.  Large complex fluid collection in the upper arm without internal vascularity may represent a hematoma, though stability cannot be assessed by ultrasound.       Signed by: Maude Navarro MD

## 2022-01-28 VITALS
HEIGHT: 62 IN | OXYGEN SATURATION: 95 % | SYSTOLIC BLOOD PRESSURE: 115 MMHG | RESPIRATION RATE: 20 BRPM | HEART RATE: 61 BPM | DIASTOLIC BLOOD PRESSURE: 59 MMHG | BODY MASS INDEX: 29.64 KG/M2 | TEMPERATURE: 97.9 F | WEIGHT: 161.1 LBS

## 2022-01-28 LAB
GLUCOSE BLDC GLUCOMTR-MCNC: 173 MG/DL (ref 70–99)
GLUCOSE BLDC GLUCOMTR-MCNC: 90 MG/DL (ref 70–99)
GLUCOSE BLDC GLUCOMTR-MCNC: 91 MG/DL (ref 70–99)

## 2022-01-28 PROCEDURE — 99239 HOSP IP/OBS DSCHRG MGMT >30: CPT | Performed by: INTERNAL MEDICINE

## 2022-01-28 PROCEDURE — 250N000013 HC RX MED GY IP 250 OP 250 PS 637: Performed by: INTERNAL MEDICINE

## 2022-01-28 PROCEDURE — 250N000013 HC RX MED GY IP 250 OP 250 PS 637: Performed by: HOSPITALIST

## 2022-01-28 PROCEDURE — 250N000013 HC RX MED GY IP 250 OP 250 PS 637: Performed by: FAMILY MEDICINE

## 2022-01-28 PROCEDURE — 250N000012 HC RX MED GY IP 250 OP 636 PS 637: Performed by: INTERNAL MEDICINE

## 2022-01-28 RX ORDER — ALBUTEROL SULFATE 90 UG/1
2 AEROSOL, METERED RESPIRATORY (INHALATION) EVERY 4 HOURS PRN
Qty: 18 G | Refills: 1 | Status: SHIPPED | OUTPATIENT
Start: 2022-01-28

## 2022-01-28 RX ADMIN — FOLIC ACID 1 MG: 1 TABLET ORAL at 09:21

## 2022-01-28 RX ADMIN — IPRATROPIUM BROMIDE AND ALBUTEROL 1 PUFF: 20; 100 SPRAY, METERED RESPIRATORY (INHALATION) at 13:13

## 2022-01-28 RX ADMIN — ATENOLOL 50 MG: 25 TABLET ORAL at 09:22

## 2022-01-28 RX ADMIN — Medication 1 TABLET: at 09:21

## 2022-01-28 RX ADMIN — IPRATROPIUM BROMIDE AND ALBUTEROL 1 PUFF: 20; 100 SPRAY, METERED RESPIRATORY (INHALATION) at 09:25

## 2022-01-28 RX ADMIN — Medication 500 MCG: at 09:21

## 2022-01-28 RX ADMIN — PANTOPRAZOLE SODIUM 40 MG: 40 TABLET, DELAYED RELEASE ORAL at 09:21

## 2022-01-28 RX ADMIN — CLONIDINE HYDROCHLORIDE 0.2 MG: 0.1 TABLET ORAL at 09:22

## 2022-01-28 RX ADMIN — POLYETHYLENE GLYCOL 3350 17 G: 17 POWDER, FOR SOLUTION ORAL at 09:21

## 2022-01-28 RX ADMIN — SERTRALINE HYDROCHLORIDE 150 MG: 50 TABLET ORAL at 09:22

## 2022-01-28 RX ADMIN — AMLODIPINE BESYLATE 10 MG: 5 TABLET ORAL at 09:22

## 2022-01-28 RX ADMIN — PREDNISONE 10 MG: 5 TABLET ORAL at 09:22

## 2022-01-28 ASSESSMENT — ACTIVITIES OF DAILY LIVING (ADL)
ADLS_ACUITY_SCORE: 8

## 2022-01-28 NOTE — PLAN OF CARE
Care Management Discharge Note    Discharge Date: 01/28/2022  Expected Time of Departure: 1300    Discharge Disposition: Home Care    Discharge Services: None    Discharge DME: None    Discharge Transportation: family or friend will provide    Private pay costs discussed: Not applicable    PAS Confirmation Code:    Patient/family educated on Medicare website which has current facility and service quality ratings:      Education Provided on the Discharge Plan:    Persons Notified of Discharge Plans: patient  Patient/Family in Agreement with the Plan: yes    Handoff Referral Completed: Yes    Additional Information  Transport at 1:00 PM via friend. Evelina  to provide RN/PT/OT services.     Dyana Mitchell RN

## 2022-01-28 NOTE — PLAN OF CARE
Occupational Therapy Discharge Summary    Reason for therapy discharge:    Discharging home.    Progress towards therapy goal(s). See goals on Care Plan in [facility] electronic health record for goal details.  Goals met    Therapy recommendation(s):    No further therapy is recommended.

## 2022-01-28 NOTE — PLAN OF CARE
Patient is feeling well. She is being discharged today, has a ride home from her boyfriend. Discharge paperwork has been explained and medications have been explained. Patient has no complications.

## 2022-01-28 NOTE — PROGRESS NOTES
Patient had hair matted to the back of her scalp. After much effort she decided that she wanted to have her hair cut. I cut patients hair at her request.

## 2022-01-28 NOTE — DISCHARGE SUMMARY
[facility] MEDICINE  DISCHARGE SUMMARY     Primary Care Physician: Rico Bui  Admission Date: 1/8/2022   Discharge Provider: Ascencion GERMAN MD Discharge Date: 1/28/2022   Diet:   Active Diet and Nourishment Order   Procedures     High Consistent Carb (75 g CHO per Meal) Diet     Diet       Code Status: Full Code   Activity: DCACTIVITY: Activity as tolerated        Condition at Discharge: Good     REASON FOR PRESENTATION(See Admission Note for Details)   Shortness of breath.  Please refer to H&P for details    PRINCIPAL & ACTIVE DISCHARGE DIAGNOSES     Principal Problem:    Infection due to 2019 novel coronavirus  Active Problems:    Cough    Shortness of breath    Hypoxia    Acute kidney injury (H)    Elevated d-dimer    Nonspecific elevation of levels of transaminase and lactic acid dehydrogenase (LDH)      PENDING LABS     Unresulted Labs Ordered in the Past 30 Days of this Admission     No orders found from 12/9/2021 to 1/9/2022.            PROCEDURES ( this hospitalization only)          RECOMMENDATIONS TO OUTPATIENT PROVIDER FOR F/U VISIT     Follow-up Appointments     Follow-up and recommended labs and tests       Follow up with primary care provider, Rico Bui, in 5-7 days to   evaluate medication change, for hospital follow- up, for right forearm   hematoma monitoring and medication refills.  The following labs/tests are   recommended: CBC, CMP.                 DISPOSITION     Home with home care    SUMMARY OF HOSPITAL COURSE:      Geovanna Huff is a 66 year old old female with past medical history of HTN, type II DM, tobacco abuse who was admitted to the hospital on 1/8/2022 with acute respiratory failure with hypoxia secondary to COVID-19 infection.   Symptoms started on December 28, tested positive on January 8, Unclear vaccination status.  Transferred to ICU 1/10.    Patient was intubated from January 14 to 17.  Transferred out of ICU 1/17 .     Acute  respiratory failure with hypoxia secondary to COVID-19 infection, ARDS;  --Completed course of remdesivir and Decadron.  --Extubated on 1/17.  Currently on room air.  Incentive spirometry, ambulate as able.    --Received DVT prophylaxis with subcu Lovenox which discontinued due to right arm hematoma on 1/23 requiring blood transfusion.  Patient advised to ambulate frequently at home.  --H&P reported onset of symptoms 12/28.  Discussed with infection control prevention department, Karyna Gama, now Covid recovered.     Probable acute COPD exacerbation; improved  --Was on Decadron on January 8-14, received Solu-Medrol, started January 14-18 then changed to prednisone and completed course  --Completed course of antibiotics with Rocephin for possible CAP.  --Continue inhaler as instructed.  Incentive spirometry.  --Advised PCP to send referral to pulmonary as an outpatient for pulmonary function test     Acute on chronic anemia;  Right arm hematoma/ecchymosis;  She has history of alcohol abuse and underlying chronic anemia hemoglobin.   -- US RUE on 1/23/2022 showed occlusive thrombus in the basilic  vein consistent with superficial thrombophlebitis also there is complex fluid collection without internal vascularity which could represent hematoma.  Likely from previous IV line/lab drawn  --Reviewed imaging with, surgeon, Dr Robertson, no indication for hematoma evacuation recommended compression and ice.   Hemoglobin dropped to 6.5 on 1/24, received 1 unit packed RBC transfusion.  Serial hemoglobin fairly stable.  Hematoma size continue to decrease in clinically  --Normal folic acid, normal iron study.  Low normal B12, initiated on supplement.  --Thrombocytopenia.  Discussed with hematologist, Dr. Kramer, no further work-up at this time.  And also do not recommend restarting anticoagulation for DVT prophylaxis given due to risk of worsening hematoma and patient clinically improving/ambulating.       Essential  hypertension; uncontrolled-improving  --Resumed home amlodipine, atenolol, clonidine.  Monitor vitals through PCP and adjust medications accordingly     Type II DM; controlled  --A1c 4.8 on 1/8/2022.  --Home medication includes Lantus and Metformin.  Despite being on a prednisone fasting blood sugar controlled.  Discussed with patient and discontinued home Lantus.  Continue home Metformin.  Patient advised to check blood sugar before each meal and bedtime and make a log book and patient to PCP in a week follow-up     Tobacco abuse  Advised to quit     Physical deconditioning due to medical illness;   --Continue to improving.  Home PT, OT    Low normal vitamin B12, and started on oral supplement    Discharge Medications with Med changes:     Current Discharge Medication List      START taking these medications    Details   albuterol (PROAIR HFA/PROVENTIL HFA/VENTOLIN HFA) 108 (90 Base) MCG/ACT inhaler Inhale 2 puffs into the lungs every 4 hours as needed for wheezing or shortness of breath / dyspnea  Qty: 18 g, Refills: 1    Comments: Pharmacy may dispense brand covered by insurance (Proair, or proventil or ventolin or generic albuterol inhaler)  Associated Diagnoses: Pneumonia due to 2019 novel coronavirus      vitamin B-12 (CYANOCOBALAMIN) 500 MCG tablet Take 1 tablet (500 mcg) by mouth daily  Qty: 30 tablet, Refills: 0    Associated Diagnoses: Vitamin B12 deficiency         CONTINUE these medications which have NOT CHANGED    Details   amLODIPine (NORVASC) 10 MG tablet Take 10 mg by mouth      atenolol (TENORMIN) 50 MG tablet [ATENOLOL (TENORMIN) 50 MG TABLET] Take 50 mg by mouth daily.      cloNIDine HCl (CATAPRES) 0.2 MG tablet [CLONIDINE HCL (CATAPRES) 0.2 MG TABLET] Take 0.2 mg by mouth 2 (two) times a day.      metFORMIN (GLUCOPHAGE) 1000 MG tablet [METFORMIN (GLUCOPHAGE) 1000 MG TABLET] Take 1,000 mg by mouth 2 (two) times a day with meals.      OLANZapine (ZYPREXA) 15 MG tablet [OLANZAPINE (ZYPREXA) 15 MG  TABLET] Take 15 mg by mouth at bedtime.      omeprazole (PRILOSEC) 20 MG capsule [OMEPRAZOLE (PRILOSEC) 20 MG CAPSULE] Take 1 capsule (20 mg total) by mouth 2 (two) times a day before meals.  Qty: 60 capsule, Refills: 0    Associated Diagnoses: Alcohol withdrawal, with unspecified complication (H); Erosive gastritis      sertraline (ZOLOFT) 100 MG tablet [SERTRALINE (ZOLOFT) 100 MG TABLET] Take 150 mg by mouth daily.      simvastatin (ZOCOR) 40 MG tablet [SIMVASTATIN (ZOCOR) 40 MG TABLET] Take 40 mg by mouth at bedtime.      aspirin 81 MG EC tablet [ASPIRIN 81 MG EC TABLET] Take 81 mg by mouth daily.         STOP taking these medications       insulin glargine (LANTUS) 100 unit/mL injection Comments:   Reason for Stopping:                     Rationale for medication changes:      Please refer to hospital course        Consults       VASCULAR ACCESS ADULT IP CONSULT  SOCIAL WORK IP CONSULT  PHARMACY TO DOSE [facility]  PHARMACY IP CONSULT  PHARMACY TO DOSE [facility]  NUTRITION SERVICES ADULT IP CONSULT  PHYSICAL THERAPY ADULT IP CONSULT  OCCUPATIONAL THERAPY ADULT IP CONSULT  HOSPITALIST IP CONSULT  SPEECH LANGUAGE PATH ADULT IP CONSULT  HEMATOLOGY & ONCOLOGY IP CONSULT    Immunizations given this encounter     Most Recent Immunizations   Administered Date(s) Administered     DT (PEDS <7y) 01/01/2000     FLUAD(HD)65+ QUAD 10/04/2021     Td,adult,historic,unspecified 01/01/2000           Anticoagulation Information      Recent INR results:   Recent Labs   Lab 01/27/22  1856   INR 0.93       SIGNIFICANT IMAGING FINDINGS     Results for orders placed or performed during the hospital encounter of 01/08/22   CT Chest Pulmonary Embolism w Contrast    Impression    IMPRESSION:  1.  Moderate bilateral pulmonary infiltrates consistent with COVID 19 pneumonitis.  2.  No pulmonary embolus.  3.  Coronary artery disease.   XR Chest Port 1 View    Impression    IMPRESSION: Mild to moderate patchy bilateral multilobar airspace disease,  most pronounced within the right upper lobe and medial lung bases and mildly progressed since the CT from 01/08/2022. No definite pleural effusion. Stable heart size.   XR Chest Port 1 View    Impression    IMPRESSION: ET tube in good position with tip 2.4 cm above soy. NG tube stents in the stomach. Left PICC line in good position with tip low SVC.  Patchy mixed interstitial and groundglass infiltrates present bilaterally consistent with pneumonia. Borderline cardiomegaly.   XR Chest 1 View    Impression    IMPRESSION: Low endotracheal tube terminates at 1.7 cm from the soy, consider 3 to 4 cm retraction. Enteric tube terminates below diaphragm and field of view. Left PICC line with tip over SVC.    Improved aeration with mild residual right basilar opacity. No pneumothorax. Cardiomediastinal silhouette within normal limits.   US Upper Extremity Venous Duplex Right    Impression    IMPRESSION:  1.  Short segment occlusive superficial thrombophlebitis in the basilic vein just proximal to the antecubital fossa.  2.  Large complex fluid collection in the upper arm without internal vascularity may represent a hematoma, though stability cannot be assessed by ultrasound.       SIGNIFICANT LABORATORY FINDINGS     Most Recent 3 CBC's:Recent Labs   Lab Test 01/27/22  0514 01/26/22  0518 01/25/22  1752 01/23/22  1435 01/19/22  0640 01/18/22  0625   WBC  --  4.8  --   --  4.9 6.6   HGB 8.1* 8.1*  8.1* 8.3*   < > 7.5* 7.6*   MCV  --  108*  --   --  112* 114*   * 122*  --   --  179 173    < > = values in this interval not displayed.     Most Recent 3 BMP's:Recent Labs   Lab Test 01/28/22  0814 01/28/22  0251 01/27/22  1642 01/27/22  0527 01/27/22  0514 01/26/22  0818 01/26/22  0518 01/25/22  0900 01/25/22  0604 01/24/22  0759 01/24/22  0636 01/17/22  0800 01/17/22  0508 01/16/22  0824 01/16/22  0534 01/15/22  0819 01/15/22  0339   NA  --   --   --   --   --   --   --   --   --   --   --   --  138  --  137  --  139    POTASSIUM  --   --   --   --  3.7  --  3.8  --  3.7  --  4.0   < > 4.5  --  4.2  --  4.6   CHLORIDE  --   --   --   --   --   --   --   --   --   --   --   --  109*  --  109*  --  110*   CO2  --   --   --   --   --   --   --   --   --   --   --   --  21*  --  22  --  20*   BUN  --   --   --   --   --   --   --   --   --   --   --   --  23*  --  23*  --  20   CR  --   --   --   --   --   --  1.03  --   --   --  0.91  --  0.95  --  1.01  --  0.88   ANIONGAP  --   --   --   --   --   --   --   --   --   --   --   --  8  --  6  --  9   OSWLADO  --   --   --   --   --   --   --   --   --   --   --   --  7.5*  --  7.7*  --  7.9*   GLC 91 90 143*   < >  --    < >  --    < >  --    < >  --    < > 273*   < > 229*   < > 141*    < > = values in this interval not displayed.     Most Recent 2 LFT's:Recent Labs   Lab Test 01/10/22  0702 01/08/22  1612   AST 43* 69*   ALT 27 30   ALKPHOS 66 76   BILITOTAL 0.3 0.4         Discharge Orders        Home care nursing referral      Home Care PT Referral for Hospital Discharge      Home Care OT Referral for Hospital Discharge      Reason for your hospital stay    Patient admitted for COVID-19 pneumonia     Follow-up and recommended labs and tests     Follow up with primary care provider, Rico Bui, in 5-7 days to evaluate medication change, for hospital follow- up, for right forearm hematoma monitoring and medication refills.  The following labs/tests are recommended: CBC, CMP.     Activity    Your activity upon discharge: activity as tolerated     MD face to face encounter    Documentation of Face to Face and Certification for Home Health Services    I certify that patient: Geovanna Huff is under my care and that I, or a nurse practitioner or physician's assistant working with me, had a face-to-face encounter that meets the physician face-to-face encounter requirements with this patient on: 1/28/2022.    This encounter with the patient was in whole, or in part, for the following  medical condition, which is the primary reason for home health care: Patient admitted with respiratory failure due to COVID-19 pneumonia required intubation, physical deconditioning, type II DM who needs home care.    I certify that, based on my findings, the following services are medically necessary home health services: Nursing, Occupational Therapy, and Physical Therapy.    My clinical findings support the need for the above services because: Nurse is needed: Make sure patient compliance with medication, watch for right forearm hematoma site, make sure patient is checking blood sugar each meal and bedtime and making a log book., Occupational Therapy Services are needed to assess and treat cognitive ability and address ADL safety due to impairment in physical deconditioning., and Physical Therapy Services are needed to assess and treat the following functional impairments: Physical deconditioning.    Further, I certify that my clinical findings support that this patient is homebound (i.e. absences from home require considerable and taxing effort and are for medical reasons or Muslim services or infrequently or of short duration when for other reasons) because: Patient admitted with respiratory failure due to COVID-19 pneumonia required intubation, physical deconditioning, type II DM who needs home care..    Based on the above findings. I certify that this patient is confined to the home and needs intermittent skilled nursing care, physical therapy and/or speech therapy.  The patient is under my care, and I have initiated the establishment of the plan of care.  This patient will be followed by a physician who will periodically review the plan of care.  Physician/Provider to provide follow up care: Rico Bui    Attending hospital physician (the Medicare certified [place] provider): Ascencion GERMAN MD  Physician Signature: See electronic signature associated with these discharge orders.  Date: 1/28/2022      Monitor and record    blood glucose 4 times a day, before meals and at bedtime.  Make a log book and present to PCP on follow-up within a week.  Call PCP if blood sugar less than 70 or more than 400     Diet    Follow this diet upon discharge: Orders Placed This Encounter      High Consistent Carb (75 g CHO per Meal) Diet       Examination   Physical Exam   Temp:  [98  F (36.7  C)-99  F (37.2  C)] 98.4  F (36.9  C)  Pulse:  [53-63] 60  Resp:  [19-20] 20  BP: (134-187)/(65-85) 187/85  SpO2:  [91 %-95 %] 95 %  Wt Readings from Last 1 Encounters:   01/22/22 73.1 kg (161 lb 1.6 oz)     Patient seen and examined.  Notes, labs, imaging report personally reviewed.  Patient denied feeling short of breath or chest pain or dizziness when ambulating.  Denied cough or congestion.  Denied fever or chills.  Reported right arm pain continued to improve.  Denied tingling or numbness, good radial pulse.  Patient reported back to baseline on her physical strength.  Discussed with nursing staffs.  Discussed with care manager/.    General: Not in obvious distress.  HEENT: Normocephalic, supple neck  Chest: Clear to auscultation bilateral anteriorly, no wheezing  Heart: S1S2 normal, regular  Abdomen: Soft. NT, ND. Bowel sounds- active.  Extremities: No legs swelling  Skin; noticed ecchymosis mostly along the medial aspect of right upper extremity from wrist to upper arm which seems improving, radial pulse intact  Neuro: alert and awake, moves all extremities       Please see EMR for more detailed significant labs, imaging, consultant notes etc.    Ascencion KINSEY MD, personally saw the patient today and spent greater than 30 minutes discharging this patient.    Ascencion GERMAN MD  [facility]    CC:Rico Bui

## 2022-01-28 NOTE — PLAN OF CARE
Problem: Risk for Delirium  Goal: Optimal Coping  Outcome: Improving     Problem: Gas Exchange Impaired  Goal: Optimal Gas Exchange  Outcome: Improving   Patient denied pain this shift. Becomes short of breath when oob moving around,but otherwise doing well tonight. Hydralazine was given 1600 for high BP-183/84. 149/70 HS. Patient hoping for discharge tomorrow.

## 2022-01-28 NOTE — PLAN OF CARE
Problem: Adult Inpatient Plan of Care  Goal: Optimal Comfort and Wellbeing  Outcome: No Change   Pt denies any pain or discomfort overnight. When rounded upon pt has been resting. Pt is on room air sating 93%. Heart rate bradycardic. Plan to discharge home with homecare.  Rossy Metcalf RN

## 2022-01-29 DIAGNOSIS — Z71.89 OTHER SPECIFIED COUNSELING: ICD-10-CM

## 2022-01-29 NOTE — PLAN OF CARE
Physical Therapy Discharge Summary    Reason for therapy discharge:    Discharged to home with home therapy.    Progress towards therapy goal(s). See goals on Care Plan in [facility] electronic health record for goal details.  Goals met    Therapy recommendation(s):    Continued therapy is recommended.  Rationale/Recommendations:  Recommend home PT.    Sandra Mirza, PT  1/29/2022

## 2022-01-31 ENCOUNTER — PATIENT OUTREACH (OUTPATIENT)
Dept: CARE COORDINATION | Facility: CLINIC | Age: 67
End: 2022-01-31
Payer: COMMERCIAL

## 2022-01-31 NOTE — PROGRESS NOTES
Clinic Care Coordination Contact  [facility]: Post-Discharge Note  SITUATION                                                      Admission:    Admission Date: 01/08/22   Reason for Admission: Shortness of breath.  Discharge:   Discharge Date: 01/28/22  Discharge Diagnosis: Infection due to 2019 novel coronavirus    BACKGROUND                                                      Per hospital discharge summary and inpatient provider notes:    Geovanna Huff is a 66 year old old female with past medical history of HTN, type II DM, tobacco abuse who was admitted to the hospital on 1/8/2022 with acute respiratory failure with hypoxia secondary to COVID-19 infection.   Symptoms started on December 28, tested positive on January 8, Unclear vaccination status.  Transferred to ICU 1/10.    Patient was intubated from January 14 to 17.  Transferred out of ICU 1/17 .     Acute respiratory failure with hypoxia secondary to COVID-19 infection, ARDS;  --Completed course of remdesivir and Decadron.  --Extubated on 1/17.  Currently on room air.  Incentive spirometry, ambulate as able.    --Received DVT prophylaxis with subcu Lovenox which discontinued due to right arm hematoma on 1/23 requiring blood transfusion.  Patient advised to ambulate frequently at home.  --H&P reported onset of symptoms 12/28.  Discussed with infection control prevention department, Karyna Gama, now Covid recovered.     Probable acute COPD exacerbation; improved  --Was on Decadron on January 8-14, received Solu-Medrol, started January 14-18 then changed to prednisone and completed course  --Completed course of antibiotics with Rocephin for possible CAP.  --Continue inhaler as instructed.  Incentive spirometry.  --Advised PCP to send referral to pulmonary as an outpatient for pulmonary function test     Acute on chronic anemia;  Right arm hematoma/ecchymosis;  She has history of alcohol abuse and underlying chronic anemia hemoglobin.   -- US RUE on  "1/23/2022 showed occlusive thrombus in the basilic  vein consistent with superficial thrombophlebitis also there is complex fluid collection without internal vascularity which could represent hematoma.  Likely from previous IV line/lab drawn  --Reviewed imaging with, surgeon, Dr Robertson, no indication for hematoma evacuation recommended compression and ice.   Hemoglobin dropped to 6.5 on 1/24, received 1 unit packed RBC transfusion.  Serial hemoglobin fairly stable.  Hematoma size continue to decrease in clinically  --Normal folic acid, normal iron study.  Low normal B12, initiated on supplement.  --Thrombocytopenia.  Discussed with hematologist, Dr. Kramer, no further work-up at this time.  And also do not recommend restarting anticoagulation for DVT prophylaxis given due to risk of worsening hematoma and patient clinically improving/ambulating.       Essential hypertension; uncontrolled-improving  --Resumed home amlodipine, atenolol, clonidine.  Monitor vitals through PCP and adjust medications accordingly     Type II DM; controlled  --A1c 4.8 on 1/8/2022.  --Home medication includes Lantus and Metformin.  Despite being on a prednisone fasting blood sugar controlled.  Discussed with patient and discontinued home Lantus.  Continue home Metformin.  Patient advised to check blood sugar before each meal and bedtime and make a log book and patient to PCP in a week follow-up     Tobacco abuse  Advised to quit     Physical deconditioning due to medical illness;   --Continue to improving.  Home PT, OT     Low normal vitamin B12, and started on oral supplement  ASSESSMENT      Enrollment  Primary Care Care Coordination Status: Declined    Discharge Assessment  How are you doing now that you are home?: \" I'm doing okay \"  How are your symptoms? (Red Flag symptoms escalate to triage hotline per guidelines): Improved  Do you feel your condition is stable enough to be safe at home until your provider visit?: Yes  Does the " patient have their discharge instructions? : Yes  Does the patient have questions regarding their discharge instructions? : No  Were you started on any new medications or were there changes to any of your previous medications? : Yes  Does the patient have all of their medications?: Yes  Do you have questions regarding any of your medications? : No  Do you have all of your needed medical supplies or equipment (DME)?  (i.e. oxygen tank, CPAP, cane, etc.): Yes                  PLAN                                                      Outpatient Plan:Your activity upon discharge: activity as tolerated  Follow this diet upon discharge: Orders Placed This Encounter  High Consistent Carb (75 g CHO per Meal) Diet  Monitor and record  blood glucose 4 times a day, before meals and at bedtime. Make a log book and present to PCP on follow-up  within a week. Call PCP if blood sugar less than 70 or more than 400  Follow-up and recommended labs and tests  No future appointments.      For any urgent concerns, please contact our 24 hour nurse triage line: [phone] ([phone])         Gay Brown MA

## 2022-03-02 ENCOUNTER — LAB REQUISITION (OUTPATIENT)
Dept: LAB | Facility: CLINIC | Age: 67
End: 2022-03-02
Payer: COMMERCIAL

## 2022-03-02 DIAGNOSIS — E78.5 HYPERLIPIDEMIA, UNSPECIFIED: ICD-10-CM

## 2022-03-02 DIAGNOSIS — I10 ESSENTIAL (PRIMARY) HYPERTENSION: ICD-10-CM

## 2022-03-02 LAB
ALBUMIN SERPL-MCNC: 3.3 G/DL (ref 3.5–5)
ALP SERPL-CCNC: 98 U/L (ref 45–120)
ALT SERPL W P-5'-P-CCNC: <9 U/L (ref 0–45)
ANION GAP SERPL CALCULATED.3IONS-SCNC: 13 MMOL/L (ref 5–18)
AST SERPL W P-5'-P-CCNC: 11 U/L (ref 0–40)
BILIRUB SERPL-MCNC: 0.2 MG/DL (ref 0–1)
BUN SERPL-MCNC: 13 MG/DL (ref 8–22)
CALCIUM SERPL-MCNC: 8.9 MG/DL (ref 8.5–10.5)
CHLORIDE BLD-SCNC: 103 MMOL/L (ref 98–107)
CHOLEST SERPL-MCNC: 157 MG/DL
CO2 SERPL-SCNC: 23 MMOL/L (ref 22–31)
CREAT SERPL-MCNC: 1.02 MG/DL (ref 0.6–1.1)
FASTING STATUS PATIENT QL REPORTED: ABNORMAL
GFR SERPL CREATININE-BSD FRML MDRD: 60 ML/MIN/1.73M2
GLUCOSE BLD-MCNC: 86 MG/DL (ref 70–125)
HDLC SERPL-MCNC: 39 MG/DL
LDLC SERPL CALC-MCNC: 57 MG/DL
POTASSIUM BLD-SCNC: 3.8 MMOL/L (ref 3.5–5)
PROT SERPL-MCNC: 6.3 G/DL (ref 6–8)
SODIUM SERPL-SCNC: 139 MMOL/L (ref 136–145)
TRIGL SERPL-MCNC: 307 MG/DL

## 2022-03-02 PROCEDURE — 80053 COMPREHEN METABOLIC PANEL: CPT | Mod: ORL | Performed by: STUDENT IN AN ORGANIZED HEALTH CARE EDUCATION/TRAINING PROGRAM

## 2022-03-02 PROCEDURE — 80061 LIPID PANEL: CPT | Mod: ORL | Performed by: STUDENT IN AN ORGANIZED HEALTH CARE EDUCATION/TRAINING PROGRAM

## 2023-05-17 ENCOUNTER — LAB REQUISITION (OUTPATIENT)
Dept: LAB | Facility: CLINIC | Age: 68
End: 2023-05-17
Payer: COMMERCIAL

## 2023-05-17 ENCOUNTER — HOSPITAL ENCOUNTER (OUTPATIENT)
Facility: CLINIC | Age: 68
Discharge: HOME | End: 2023-05-17
Admitting: STUDENT IN AN ORGANIZED HEALTH CARE EDUCATION/TRAINING PROGRAM
Payer: COMMERCIAL

## 2023-05-17 DIAGNOSIS — I10 ESSENTIAL (PRIMARY) HYPERTENSION: ICD-10-CM

## 2023-05-17 DIAGNOSIS — E11.9 TYPE 2 DIABETES MELLITUS WITHOUT COMPLICATIONS (H): ICD-10-CM

## 2023-05-17 DIAGNOSIS — E78.5 HYPERLIPIDEMIA, UNSPECIFIED: ICD-10-CM

## 2023-05-17 PROCEDURE — 80061 LIPID PANEL: CPT | Performed by: STUDENT IN AN ORGANIZED HEALTH CARE EDUCATION/TRAINING PROGRAM

## 2023-05-17 PROCEDURE — 80053 COMPREHEN METABOLIC PANEL: CPT | Mod: ORL | Performed by: STUDENT IN AN ORGANIZED HEALTH CARE EDUCATION/TRAINING PROGRAM

## 2023-05-18 LAB
ALBUMIN SERPL BCG-MCNC: 4.2 G/DL (ref 3.5–5.2)
ALP SERPL-CCNC: 94 U/L (ref 35–104)
ALT SERPL W P-5'-P-CCNC: 16 U/L (ref 10–35)
ANION GAP SERPL CALCULATED.3IONS-SCNC: 16 MMOL/L (ref 7–15)
AST SERPL W P-5'-P-CCNC: 21 U/L (ref 10–35)
BILIRUB SERPL-MCNC: <0.2 MG/DL
BUN SERPL-MCNC: 12.9 MG/DL (ref 8–23)
CALCIUM SERPL-MCNC: 9.1 MG/DL (ref 8.8–10.2)
CHLORIDE SERPL-SCNC: 100 MMOL/L (ref 98–107)
CHOLEST SERPL-MCNC: 212 MG/DL
CREAT SERPL-MCNC: 1.3 MG/DL (ref 0.51–0.95)
DEPRECATED HCO3 PLAS-SCNC: 21 MMOL/L (ref 22–29)
GFR SERPL CREATININE-BSD FRML MDRD: 45 ML/MIN/1.73M2
GLUCOSE SERPL-MCNC: 191 MG/DL (ref 70–99)
HDLC SERPL-MCNC: 50 MG/DL
LDLC SERPL CALC-MCNC: 94 MG/DL
NONHDLC SERPL-MCNC: 162 MG/DL
POTASSIUM SERPL-SCNC: 4.1 MMOL/L (ref 3.4–5.3)
PROT SERPL-MCNC: 6.5 G/DL (ref 6.4–8.3)
SODIUM SERPL-SCNC: 137 MMOL/L (ref 136–145)
TRIGL SERPL-MCNC: 338 MG/DL

## 2023-10-18 ENCOUNTER — LAB REQUISITION (OUTPATIENT)
Dept: LAB | Facility: CLINIC | Age: 68
End: 2023-10-18
Payer: COMMERCIAL

## 2023-10-18 ENCOUNTER — HOSPITAL ENCOUNTER (OUTPATIENT)
Facility: CLINIC | Age: 68
Discharge: HOME | End: 2023-10-18
Admitting: STUDENT IN AN ORGANIZED HEALTH CARE EDUCATION/TRAINING PROGRAM
Payer: COMMERCIAL

## 2023-10-18 DIAGNOSIS — R79.89 OTHER SPECIFIED ABNORMAL FINDINGS OF BLOOD CHEMISTRY: ICD-10-CM

## 2023-10-18 PROCEDURE — 80069 RENAL FUNCTION PANEL: CPT | Mod: ORL | Performed by: STUDENT IN AN ORGANIZED HEALTH CARE EDUCATION/TRAINING PROGRAM

## 2023-10-19 LAB
ALBUMIN SERPL BCG-MCNC: 4.2 G/DL (ref 3.5–5.2)
ANION GAP SERPL CALCULATED.3IONS-SCNC: 13 MMOL/L (ref 7–15)
BUN SERPL-MCNC: 14.6 MG/DL (ref 8–23)
CALCIUM SERPL-MCNC: 8.5 MG/DL (ref 8.8–10.2)
CHLORIDE SERPL-SCNC: 100 MMOL/L (ref 98–107)
CREAT SERPL-MCNC: 1.41 MG/DL (ref 0.51–0.95)
DEPRECATED HCO3 PLAS-SCNC: 24 MMOL/L (ref 22–29)
EGFRCR SERPLBLD CKD-EPI 2021: 41 ML/MIN/1.73M2
GLUCOSE SERPL-MCNC: 125 MG/DL (ref 70–99)
PHOSPHATE SERPL-MCNC: 3.4 MG/DL (ref 2.5–4.5)
POTASSIUM SERPL-SCNC: 4.6 MMOL/L (ref 3.4–5.3)
SODIUM SERPL-SCNC: 137 MMOL/L (ref 135–145)

## 2024-10-30 ENCOUNTER — LAB REQUISITION (OUTPATIENT)
Dept: LAB | Facility: CLINIC | Age: 69
End: 2024-10-30

## 2024-10-30 DIAGNOSIS — E11.9 TYPE 2 DIABETES MELLITUS WITHOUT COMPLICATIONS (H): ICD-10-CM

## 2024-10-30 DIAGNOSIS — E78.5 HYPERLIPIDEMIA, UNSPECIFIED: ICD-10-CM

## 2024-10-30 PROCEDURE — 82043 UR ALBUMIN QUANTITATIVE: CPT | Performed by: STUDENT IN AN ORGANIZED HEALTH CARE EDUCATION/TRAINING PROGRAM

## 2024-10-30 PROCEDURE — 80061 LIPID PANEL: CPT | Performed by: STUDENT IN AN ORGANIZED HEALTH CARE EDUCATION/TRAINING PROGRAM

## 2024-10-30 PROCEDURE — 80053 COMPREHEN METABOLIC PANEL: CPT | Performed by: STUDENT IN AN ORGANIZED HEALTH CARE EDUCATION/TRAINING PROGRAM

## 2024-10-31 LAB
ALBUMIN SERPL BCG-MCNC: 4.1 G/DL (ref 3.5–5.2)
ALP SERPL-CCNC: 106 U/L (ref 40–150)
ALT SERPL W P-5'-P-CCNC: 18 U/L (ref 0–50)
ANION GAP SERPL CALCULATED.3IONS-SCNC: 18 MMOL/L (ref 7–15)
AST SERPL W P-5'-P-CCNC: 30 U/L (ref 0–45)
BILIRUB SERPL-MCNC: 0.2 MG/DL
BUN SERPL-MCNC: 12.6 MG/DL (ref 8–23)
CALCIUM SERPL-MCNC: 8.9 MG/DL (ref 8.8–10.4)
CHLORIDE SERPL-SCNC: 99 MMOL/L (ref 98–107)
CHOLEST SERPL-MCNC: 184 MG/DL
CREAT SERPL-MCNC: 1.27 MG/DL (ref 0.51–0.95)
CREAT UR-MCNC: 105 MG/DL
EGFRCR SERPLBLD CKD-EPI 2021: 46 ML/MIN/1.73M2
FASTING STATUS PATIENT QL REPORTED: ABNORMAL
FASTING STATUS PATIENT QL REPORTED: ABNORMAL
GLUCOSE SERPL-MCNC: 121 MG/DL (ref 70–99)
HCO3 SERPL-SCNC: 18 MMOL/L (ref 22–29)
HDLC SERPL-MCNC: 62 MG/DL
LDLC SERPL CALC-MCNC: 73 MG/DL
MICROALBUMIN UR-MCNC: 20.1 MG/L
MICROALBUMIN/CREAT UR: 19.14 MG/G CR (ref 0–25)
NONHDLC SERPL-MCNC: 122 MG/DL
POTASSIUM SERPL-SCNC: 4.1 MMOL/L (ref 3.4–5.3)
PROT SERPL-MCNC: 6.9 G/DL (ref 6.4–8.3)
SODIUM SERPL-SCNC: 135 MMOL/L (ref 135–145)
TRIGL SERPL-MCNC: 245 MG/DL